# Patient Record
Sex: FEMALE | Race: OTHER | HISPANIC OR LATINO | Employment: UNEMPLOYED | URBAN - METROPOLITAN AREA
[De-identification: names, ages, dates, MRNs, and addresses within clinical notes are randomized per-mention and may not be internally consistent; named-entity substitution may affect disease eponyms.]

---

## 2024-10-28 ENCOUNTER — APPOINTMENT (OUTPATIENT)
Dept: RADIOLOGY | Facility: HOSPITAL | Age: 49
End: 2024-10-28
Payer: COMMERCIAL

## 2024-10-28 ENCOUNTER — HOSPITAL ENCOUNTER (EMERGENCY)
Facility: HOSPITAL | Age: 49
Discharge: HOME | End: 2024-10-29
Attending: STUDENT IN AN ORGANIZED HEALTH CARE EDUCATION/TRAINING PROGRAM
Payer: COMMERCIAL

## 2024-10-28 VITALS
HEART RATE: 96 BPM | WEIGHT: 187 LBS | SYSTOLIC BLOOD PRESSURE: 184 MMHG | RESPIRATION RATE: 20 BRPM | TEMPERATURE: 97.5 F | WEIGHT: 187 LBS | SYSTOLIC BLOOD PRESSURE: 184 MMHG | HEIGHT: 62 IN | HEIGHT: 62 IN | OXYGEN SATURATION: 97 % | HEART RATE: 96 BPM | DIASTOLIC BLOOD PRESSURE: 110 MMHG | DIASTOLIC BLOOD PRESSURE: 110 MMHG | RESPIRATION RATE: 20 BRPM | TEMPERATURE: 97.5 F | OXYGEN SATURATION: 97 % | BODY MASS INDEX: 34.41 KG/M2 | BODY MASS INDEX: 34.41 KG/M2

## 2024-10-28 DIAGNOSIS — L02.416 ABSCESS OF LEFT THIGH: ICD-10-CM

## 2024-10-28 DIAGNOSIS — J40 BRONCHITIS: Primary | ICD-10-CM

## 2024-10-28 LAB
FLUAV RNA RESP QL NAA+PROBE: NOT DETECTED
FLUAV RNA RESP QL NAA+PROBE: NOT DETECTED
FLUBV RNA RESP QL NAA+PROBE: NOT DETECTED
FLUBV RNA RESP QL NAA+PROBE: NOT DETECTED
RSV RNA RESP QL NAA+PROBE: NOT DETECTED
RSV RNA RESP QL NAA+PROBE: NOT DETECTED
SARS-COV-2 RNA RESP QL NAA+PROBE: NOT DETECTED
SARS-COV-2 RNA RESP QL NAA+PROBE: NOT DETECTED

## 2024-10-28 PROCEDURE — 93971 EXTREMITY STUDY: CPT

## 2024-10-28 PROCEDURE — 71046 X-RAY EXAM CHEST 2 VIEWS: CPT

## 2024-10-28 PROCEDURE — 10140 I&D HMTMA SEROMA/FLUID COLLJ: CPT | Performed by: STUDENT IN AN ORGANIZED HEALTH CARE EDUCATION/TRAINING PROGRAM

## 2024-10-28 PROCEDURE — 2500000004 HC RX 250 GENERAL PHARMACY W/ HCPCS (ALT 636 FOR OP/ED): Performed by: STUDENT IN AN ORGANIZED HEALTH CARE EDUCATION/TRAINING PROGRAM

## 2024-10-28 PROCEDURE — 94640 AIRWAY INHALATION TREATMENT: CPT

## 2024-10-28 PROCEDURE — 87637 SARSCOV2&INF A&B&RSV AMP PRB: CPT | Performed by: STUDENT IN AN ORGANIZED HEALTH CARE EDUCATION/TRAINING PROGRAM

## 2024-10-28 PROCEDURE — 93971 EXTREMITY STUDY: CPT | Performed by: RADIOLOGY

## 2024-10-28 PROCEDURE — 71046 X-RAY EXAM CHEST 2 VIEWS: CPT | Performed by: RADIOLOGY

## 2024-10-28 PROCEDURE — 2500000001 HC RX 250 WO HCPCS SELF ADMINISTERED DRUGS (ALT 637 FOR MEDICARE OP): Performed by: STUDENT IN AN ORGANIZED HEALTH CARE EDUCATION/TRAINING PROGRAM

## 2024-10-28 RX ORDER — DEXAMETHASONE 6 MG/1
6 TABLET ORAL ONCE
Status: COMPLETED | OUTPATIENT
Start: 2024-10-28 | End: 2024-10-28

## 2024-10-28 RX ORDER — ALBUTEROL SULFATE 90 UG/1
2 INHALANT RESPIRATORY (INHALATION) ONCE
Status: COMPLETED | OUTPATIENT
Start: 2024-10-28 | End: 2024-10-28

## 2024-10-28 RX ADMIN — ALBUTEROL SULFATE 2 PUFF: 90 AEROSOL, METERED RESPIRATORY (INHALATION) at 23:35

## 2024-10-28 RX ADMIN — DEXAMETHASONE 6 MG: 6 TABLET ORAL at 23:27

## 2024-10-28 ASSESSMENT — LIFESTYLE VARIABLES
EVER FELT BAD OR GUILTY ABOUT YOUR DRINKING: NO
HAVE PEOPLE ANNOYED YOU BY CRITICIZING YOUR DRINKING: NO
EVER HAD A DRINK FIRST THING IN THE MORNING TO STEADY YOUR NERVES TO GET RID OF A HANGOVER: NO
TOTAL SCORE: 0
HAVE YOU EVER FELT YOU SHOULD CUT DOWN ON YOUR DRINKING: NO

## 2024-10-28 ASSESSMENT — PAIN DESCRIPTION - LOCATION: LOCATION: HIP

## 2024-10-28 ASSESSMENT — PAIN DESCRIPTION - ORIENTATION: ORIENTATION: LEFT

## 2024-10-28 ASSESSMENT — PAIN DESCRIPTION - PAIN TYPE: TYPE: CHRONIC PAIN

## 2024-10-28 ASSESSMENT — PAIN SCALES - GENERAL: PAINLEVEL_OUTOF10: 8

## 2024-10-28 ASSESSMENT — PAIN - FUNCTIONAL ASSESSMENT: PAIN_FUNCTIONAL_ASSESSMENT: 0-10

## 2024-10-29 PROCEDURE — 87205 SMEAR GRAM STAIN: CPT | Mod: PARLAB | Performed by: STUDENT IN AN ORGANIZED HEALTH CARE EDUCATION/TRAINING PROGRAM

## 2024-10-29 PROCEDURE — 10060 I&D ABSCESS SIMPLE/SINGLE: CPT

## 2024-10-29 PROCEDURE — 99284 EMERGENCY DEPT VISIT MOD MDM: CPT | Mod: 25

## 2024-10-29 PROCEDURE — 87070 CULTURE OTHR SPECIMN AEROBIC: CPT | Mod: PARLAB | Performed by: STUDENT IN AN ORGANIZED HEALTH CARE EDUCATION/TRAINING PROGRAM

## 2024-10-29 PROCEDURE — 2500000002 HC RX 250 W HCPCS SELF ADMINISTERED DRUGS (ALT 637 FOR MEDICARE OP, ALT 636 FOR OP/ED): Mod: MUE | Performed by: STUDENT IN AN ORGANIZED HEALTH CARE EDUCATION/TRAINING PROGRAM

## 2024-10-29 PROCEDURE — 2500000002 HC RX 250 W HCPCS SELF ADMINISTERED DRUGS (ALT 637 FOR MEDICARE OP, ALT 636 FOR OP/ED): Performed by: STUDENT IN AN ORGANIZED HEALTH CARE EDUCATION/TRAINING PROGRAM

## 2024-10-29 RX ORDER — METHYLPREDNISOLONE 4 MG/1
TABLET ORAL
Qty: 21 TABLET | Refills: 0 | Status: SHIPPED | OUTPATIENT
Start: 2024-10-29 | End: 2024-11-05

## 2024-10-29 RX ORDER — SULFAMETHOXAZOLE AND TRIMETHOPRIM 800; 160 MG/1; MG/1
1 TABLET ORAL ONCE
Status: COMPLETED | OUTPATIENT
Start: 2024-10-29 | End: 2024-10-29

## 2024-10-29 RX ORDER — SULFAMETHOXAZOLE AND TRIMETHOPRIM 800; 160 MG/1; MG/1
1 TABLET ORAL 2 TIMES DAILY
Qty: 28 TABLET | Refills: 0 | Status: SHIPPED | OUTPATIENT
Start: 2024-10-29 | End: 2024-11-12

## 2024-10-29 RX ADMIN — SULFAMETHOXAZOLE AND TRIMETHOPRIM 1 TABLET: 800; 160 TABLET ORAL at 00:20

## 2024-10-30 ENCOUNTER — OFFICE VISIT (OUTPATIENT)
Dept: PRIMARY CARE | Facility: CLINIC | Age: 49
End: 2024-10-30
Payer: COMMERCIAL

## 2024-10-30 VITALS
SYSTOLIC BLOOD PRESSURE: 126 MMHG | OXYGEN SATURATION: 97 % | TEMPERATURE: 98 F | BODY MASS INDEX: 30.13 KG/M2 | DIASTOLIC BLOOD PRESSURE: 88 MMHG | WEIGHT: 159.6 LBS | HEIGHT: 61 IN | RESPIRATION RATE: 16 BRPM | HEART RATE: 92 BPM

## 2024-10-30 DIAGNOSIS — J40 BRONCHITIS: Primary | ICD-10-CM

## 2024-10-30 DIAGNOSIS — L02.416 ABSCESS OF LEFT THIGH: ICD-10-CM

## 2024-10-30 PROCEDURE — 99214 OFFICE O/P EST MOD 30 MIN: CPT | Performed by: NURSE PRACTITIONER

## 2024-10-30 PROCEDURE — 2500000002 HC RX 250 W HCPCS SELF ADMINISTERED DRUGS (ALT 637 FOR MEDICARE OP, ALT 636 FOR OP/ED): Mod: MUE | Performed by: NURSE PRACTITIONER

## 2024-10-30 RX ORDER — ALBUTEROL SULFATE 0.83 MG/ML
2.5 SOLUTION RESPIRATORY (INHALATION) ONCE
Status: COMPLETED | OUTPATIENT
Start: 2024-10-30 | End: 2024-10-30

## 2024-10-30 RX ORDER — ALBUTEROL SULFATE 90 UG/1
2 INHALANT RESPIRATORY (INHALATION) EVERY 4 HOURS PRN
COMMUNITY
Start: 2024-10-28

## 2024-10-30 RX ORDER — OMEPRAZOLE 20 MG/1
20 CAPSULE, DELAYED RELEASE ORAL
COMMUNITY
Start: 2023-04-16

## 2024-10-30 SDOH — ECONOMIC STABILITY: FOOD INSECURITY: WITHIN THE PAST 12 MONTHS, YOU WORRIED THAT YOUR FOOD WOULD RUN OUT BEFORE YOU GOT MONEY TO BUY MORE.: NEVER TRUE

## 2024-10-30 SDOH — ECONOMIC STABILITY: FOOD INSECURITY: WITHIN THE PAST 12 MONTHS, THE FOOD YOU BOUGHT JUST DIDN'T LAST AND YOU DIDN'T HAVE MONEY TO GET MORE.: NEVER TRUE

## 2024-10-30 ASSESSMENT — COLUMBIA-SUICIDE SEVERITY RATING SCALE - C-SSRS
2. HAVE YOU ACTUALLY HAD ANY THOUGHTS OF KILLING YOURSELF?: NO
6. HAVE YOU EVER DONE ANYTHING, STARTED TO DO ANYTHING, OR PREPARED TO DO ANYTHING TO END YOUR LIFE?: NO
1. IN THE PAST MONTH, HAVE YOU WISHED YOU WERE DEAD OR WISHED YOU COULD GO TO SLEEP AND NOT WAKE UP?: NO

## 2024-10-30 ASSESSMENT — LIFESTYLE VARIABLES
HOW OFTEN DO YOU HAVE SIX OR MORE DRINKS ON ONE OCCASION: NEVER
HOW OFTEN DO YOU HAVE A DRINK CONTAINING ALCOHOL: NEVER
HOW MANY STANDARD DRINKS CONTAINING ALCOHOL DO YOU HAVE ON A TYPICAL DAY: PATIENT DOES NOT DRINK
AUDIT-C TOTAL SCORE: 0
SKIP TO QUESTIONS 9-10: 1

## 2024-10-30 ASSESSMENT — ANXIETY QUESTIONNAIRES
7. FEELING AFRAID AS IF SOMETHING AWFUL MIGHT HAPPEN: NOT AT ALL
IF YOU CHECKED OFF ANY PROBLEMS ON THIS QUESTIONNAIRE, HOW DIFFICULT HAVE THESE PROBLEMS MADE IT FOR YOU TO DO YOUR WORK, TAKE CARE OF THINGS AT HOME, OR GET ALONG WITH OTHER PEOPLE: NOT DIFFICULT AT ALL
2. NOT BEING ABLE TO STOP OR CONTROL WORRYING: NOT AT ALL
4. TROUBLE RELAXING: NOT AT ALL
6. BECOMING EASILY ANNOYED OR IRRITABLE: NOT AT ALL
5. BEING SO RESTLESS THAT IT IS HARD TO SIT STILL: NOT AT ALL
1. FEELING NERVOUS, ANXIOUS, OR ON EDGE: NOT AT ALL
GAD7 TOTAL SCORE: 0
3. WORRYING TOO MUCH ABOUT DIFFERENT THINGS: NOT AT ALL

## 2024-10-30 ASSESSMENT — PAIN SCALES - GENERAL: PAINLEVEL_OUTOF10: 0-NO PAIN

## 2024-10-30 ASSESSMENT — ENCOUNTER SYMPTOMS
OCCASIONAL FEELINGS OF UNSTEADINESS: 1
LOSS OF SENSATION IN FEET: 0
DEPRESSION: 0

## 2024-10-30 ASSESSMENT — PATIENT HEALTH QUESTIONNAIRE - PHQ9
1. LITTLE INTEREST OR PLEASURE IN DOING THINGS: NOT AT ALL
SUM OF ALL RESPONSES TO PHQ9 QUESTIONS 1 & 2: 0
2. FEELING DOWN, DEPRESSED OR HOPELESS: NOT AT ALL

## 2024-10-31 LAB
BACTERIA SPEC CULT: NORMAL
BACTERIA SPEC CULT: NORMAL
GRAM STN SPEC: NORMAL

## 2024-11-06 ENCOUNTER — APPOINTMENT (OUTPATIENT)
Dept: PRIMARY CARE | Facility: CLINIC | Age: 49
End: 2024-11-06
Payer: COMMERCIAL

## 2024-11-06 ENCOUNTER — APPOINTMENT (OUTPATIENT)
Dept: ORTHOPEDIC SURGERY | Facility: CLINIC | Age: 49
End: 2024-11-06
Payer: COMMERCIAL

## 2024-11-13 ENCOUNTER — TELEPHONE (OUTPATIENT)
Dept: PRIMARY CARE | Facility: CLINIC | Age: 49
End: 2024-11-13
Payer: MEDICARE

## 2024-11-13 NOTE — TELEPHONE ENCOUNTER
Rajani from isiting nurses called  . Patient is currently at  [facility]. Patient is soon to be discharged with home nursing and physical therapy. Visiting nursing is is requesting verbal orders from the provider.

## 2024-11-26 ENCOUNTER — TELEPHONE (OUTPATIENT)
Dept: PRIMARY CARE | Facility: CLINIC | Age: 49
End: 2024-11-26
Payer: MEDICARE

## 2024-11-26 NOTE — TELEPHONE ENCOUNTER
Visiting Nurse Gerard called regarding patient about to get discharge from the hospital. Visiting nurse Rajani  stated they are reaching out to the provider to get verbal orders for home nursing care and physical therapy. Best number to call Nurse Gerard at [phone].The visit nurse stated she would just like to hear from provider wether provider will carry the orders or not.

## 2024-11-27 ENCOUNTER — TELEPHONE (OUTPATIENT)
Dept: PRIMARY CARE | Facility: CLINIC | Age: 49
End: 2024-11-27
Payer: MEDICARE

## 2024-11-27 NOTE — TELEPHONE ENCOUNTER
Spoke with Rajani from home care [phone] --- patient with possible discharge from hospital on Friday.   She will coordinate home going orders with other provider.

## 2025-05-07 ENCOUNTER — HOSPITAL ENCOUNTER (INPATIENT)
Facility: HOSPITAL | Age: 50
End: 2025-05-07
Attending: STUDENT IN AN ORGANIZED HEALTH CARE EDUCATION/TRAINING PROGRAM | Admitting: NURSE PRACTITIONER
Payer: MEDICARE

## 2025-05-07 DIAGNOSIS — I12.9 HYPERTENSIVE CHRONIC KIDNEY DISEASE WITH STAGE 1 THROUGH STAGE 4 CHRONIC KIDNEY DISEASE, OR UNSPECIFIED CHRONIC KIDNEY DISEASE: ICD-10-CM

## 2025-05-07 DIAGNOSIS — N17.9 AKI (ACUTE KIDNEY INJURY): Primary | ICD-10-CM

## 2025-05-07 DIAGNOSIS — E87.6 HYPOKALEMIA: ICD-10-CM

## 2025-05-07 DIAGNOSIS — I10 ESSENTIAL (PRIMARY) HYPERTENSION: ICD-10-CM

## 2025-05-07 PROBLEM — D64.9 ANEMIA: Status: ACTIVE | Noted: 2025-05-07

## 2025-05-07 LAB
ANION GAP SERPL CALC-SCNC: 16 MMOL/L (ref 10–20)
BASOPHILS # BLD AUTO: 0.04 X10*3/UL (ref 0–0.1)
BASOPHILS NFR BLD AUTO: 0.6 %
BUN SERPL-MCNC: 13 MG/DL (ref 6–23)
CALCIUM SERPL-MCNC: 9.7 MG/DL (ref 8.6–10.3)
CHLORIDE SERPL-SCNC: 101 MMOL/L (ref 98–107)
CO2 SERPL-SCNC: 25 MMOL/L (ref 21–32)
CREAT SERPL-MCNC: 3.24 MG/DL (ref 0.5–1.05)
EGFRCR SERPLBLD CKD-EPI 2021: 17 ML/MIN/1.73M*2
EOSINOPHIL # BLD AUTO: 0.21 X10*3/UL (ref 0–0.7)
EOSINOPHIL NFR BLD AUTO: 2.9 %
ERYTHROCYTE [DISTWIDTH] IN BLOOD BY AUTOMATED COUNT: 16.3 % (ref 11.5–14.5)
GLUCOSE BLD MANUAL STRIP-MCNC: 149 MG/DL (ref 74–99)
GLUCOSE SERPL-MCNC: 128 MG/DL (ref 74–99)
HCT VFR BLD AUTO: 28.8 % (ref 36–46)
HGB BLD-MCNC: 9.6 G/DL (ref 12–16)
IMM GRANULOCYTES # BLD AUTO: 0.04 X10*3/UL (ref 0–0.7)
IMM GRANULOCYTES NFR BLD AUTO: 0.6 % (ref 0–0.9)
LYMPHOCYTES # BLD AUTO: 0.91 X10*3/UL (ref 1.2–4.8)
LYMPHOCYTES NFR BLD AUTO: 12.7 %
MCH RBC QN AUTO: 30.2 PG (ref 26–34)
MCHC RBC AUTO-ENTMCNC: 33.3 G/DL (ref 32–36)
MCV RBC AUTO: 91 FL (ref 80–100)
MONOCYTES # BLD AUTO: 1.05 X10*3/UL (ref 0.1–1)
MONOCYTES NFR BLD AUTO: 14.7 %
NEUTROPHILS # BLD AUTO: 4.9 X10*3/UL (ref 1.2–7.7)
NEUTROPHILS NFR BLD AUTO: 68.5 %
NRBC BLD-RTO: 0 /100 WBCS (ref 0–0)
PLATELET # BLD AUTO: 381 X10*3/UL (ref 150–450)
POTASSIUM SERPL-SCNC: 3.1 MMOL/L (ref 3.5–5.3)
RBC # BLD AUTO: 3.18 X10*6/UL (ref 4–5.2)
SODIUM SERPL-SCNC: 139 MMOL/L (ref 136–145)
WBC # BLD AUTO: 7.2 X10*3/UL (ref 4.4–11.3)

## 2025-05-07 PROCEDURE — 1100000001 HC PRIVATE ROOM DAILY

## 2025-05-07 PROCEDURE — 2500000004 HC RX 250 GENERAL PHARMACY W/ HCPCS (ALT 636 FOR OP/ED): Mod: JZ | Performed by: STUDENT IN AN ORGANIZED HEALTH CARE EDUCATION/TRAINING PROGRAM

## 2025-05-07 PROCEDURE — 82947 ASSAY GLUCOSE BLOOD QUANT: CPT

## 2025-05-07 PROCEDURE — 80048 BASIC METABOLIC PNL TOTAL CA: CPT | Performed by: STUDENT IN AN ORGANIZED HEALTH CARE EDUCATION/TRAINING PROGRAM

## 2025-05-07 PROCEDURE — 36415 COLL VENOUS BLD VENIPUNCTURE: CPT | Performed by: STUDENT IN AN ORGANIZED HEALTH CARE EDUCATION/TRAINING PROGRAM

## 2025-05-07 PROCEDURE — 96360 HYDRATION IV INFUSION INIT: CPT

## 2025-05-07 PROCEDURE — 99285 EMERGENCY DEPT VISIT HI MDM: CPT | Performed by: STUDENT IN AN ORGANIZED HEALTH CARE EDUCATION/TRAINING PROGRAM

## 2025-05-07 PROCEDURE — 99222 1ST HOSP IP/OBS MODERATE 55: CPT | Performed by: NURSE PRACTITIONER

## 2025-05-07 PROCEDURE — 85025 COMPLETE CBC W/AUTO DIFF WBC: CPT | Performed by: STUDENT IN AN ORGANIZED HEALTH CARE EDUCATION/TRAINING PROGRAM

## 2025-05-07 RX ORDER — SODIUM CHLORIDE, SODIUM LACTATE, POTASSIUM CHLORIDE, CALCIUM CHLORIDE 600; 310; 30; 20 MG/100ML; MG/100ML; MG/100ML; MG/100ML
75 INJECTION, SOLUTION INTRAVENOUS CONTINUOUS
Status: ACTIVE | OUTPATIENT
Start: 2025-05-07 | End: 2025-05-09

## 2025-05-07 RX ORDER — INSULIN LISPRO 100 [IU]/ML
0-10 INJECTION, SOLUTION INTRAVENOUS; SUBCUTANEOUS
Status: DISCONTINUED | OUTPATIENT
Start: 2025-05-08 | End: 2025-05-22 | Stop reason: HOSPADM

## 2025-05-07 RX ORDER — ACETAMINOPHEN 325 MG/1
650 TABLET ORAL EVERY 4 HOURS PRN
Status: DISCONTINUED | OUTPATIENT
Start: 2025-05-07 | End: 2025-05-22 | Stop reason: HOSPADM

## 2025-05-07 RX ORDER — DEXTROSE 50 % IN WATER (D50W) INTRAVENOUS SYRINGE
25
Status: DISCONTINUED | OUTPATIENT
Start: 2025-05-07 | End: 2025-05-22 | Stop reason: HOSPADM

## 2025-05-07 RX ORDER — POTASSIUM CHLORIDE 20 MEQ/1
40 TABLET, EXTENDED RELEASE ORAL ONCE
Status: COMPLETED | OUTPATIENT
Start: 2025-05-07 | End: 2025-05-08

## 2025-05-07 RX ORDER — DEXTROSE 50 % IN WATER (D50W) INTRAVENOUS SYRINGE
12.5
Status: DISCONTINUED | OUTPATIENT
Start: 2025-05-07 | End: 2025-05-22 | Stop reason: HOSPADM

## 2025-05-07 RX ADMIN — SODIUM CHLORIDE 1000 ML: 0.9 INJECTION, SOLUTION INTRAVENOUS at 19:47

## 2025-05-07 ASSESSMENT — PAIN SCALES - GENERAL: PAINLEVEL_OUTOF10: 0 - NO PAIN

## 2025-05-07 ASSESSMENT — PAIN - FUNCTIONAL ASSESSMENT: PAIN_FUNCTIONAL_ASSESSMENT: 0-10

## 2025-05-07 NOTE — ED TRIAGE NOTES
Pt came in from nursing facility by ems c/o abnormal labs. EMS states the facility said her vancomycin levels are high. Pt has a pic line and is diabetic. Pt denies cp sob.

## 2025-05-08 ENCOUNTER — APPOINTMENT (OUTPATIENT)
Dept: RADIOLOGY | Facility: HOSPITAL | Age: 50
End: 2025-05-08
Payer: MEDICARE

## 2025-05-08 LAB
ANION GAP SERPL CALC-SCNC: 16 MMOL/L (ref 10–20)
BUN SERPL-MCNC: 12 MG/DL (ref 6–23)
CALCIUM SERPL-MCNC: 9.1 MG/DL (ref 8.6–10.3)
CHLORIDE SERPL-SCNC: 106 MMOL/L (ref 98–107)
CO2 SERPL-SCNC: 23 MMOL/L (ref 21–32)
CREAT SERPL-MCNC: 3.05 MG/DL (ref 0.5–1.05)
EGFRCR SERPLBLD CKD-EPI 2021: 18 ML/MIN/1.73M*2
ERYTHROCYTE [DISTWIDTH] IN BLOOD BY AUTOMATED COUNT: 16.3 % (ref 11.5–14.5)
GLUCOSE BLD MANUAL STRIP-MCNC: 102 MG/DL (ref 74–99)
GLUCOSE BLD MANUAL STRIP-MCNC: 117 MG/DL (ref 74–99)
GLUCOSE BLD MANUAL STRIP-MCNC: 137 MG/DL (ref 74–99)
GLUCOSE BLD MANUAL STRIP-MCNC: 166 MG/DL (ref 74–99)
GLUCOSE SERPL-MCNC: 95 MG/DL (ref 74–99)
HCT VFR BLD AUTO: 28.3 % (ref 36–46)
HGB BLD-MCNC: 9.1 G/DL (ref 12–16)
HOLD SPECIMEN: 293
HOLD SPECIMEN: 293
HOLD SPECIMEN: NORMAL
MCH RBC QN AUTO: 29.4 PG (ref 26–34)
MCHC RBC AUTO-ENTMCNC: 32.2 G/DL (ref 32–36)
MCV RBC AUTO: 91 FL (ref 80–100)
NRBC BLD-RTO: 0 /100 WBCS (ref 0–0)
PLATELET # BLD AUTO: 377 X10*3/UL (ref 150–450)
POTASSIUM SERPL-SCNC: 3.9 MMOL/L (ref 3.5–5.3)
RBC # BLD AUTO: 3.1 X10*6/UL (ref 4–5.2)
SODIUM SERPL-SCNC: 141 MMOL/L (ref 136–145)
VANCOMYCIN TROUGH SERPL-MCNC: 47.8 UG/ML (ref 5–20)
WBC # BLD AUTO: 6.6 X10*3/UL (ref 4.4–11.3)

## 2025-05-08 PROCEDURE — 80048 BASIC METABOLIC PNL TOTAL CA: CPT | Performed by: NURSE PRACTITIONER

## 2025-05-08 PROCEDURE — 2500000001 HC RX 250 WO HCPCS SELF ADMINISTERED DRUGS (ALT 637 FOR MEDICARE OP): Performed by: NURSE PRACTITIONER

## 2025-05-08 PROCEDURE — 97161 PT EVAL LOW COMPLEX 20 MIN: CPT | Mod: GP

## 2025-05-08 PROCEDURE — 71045 X-RAY EXAM CHEST 1 VIEW: CPT | Performed by: INTERNAL MEDICINE

## 2025-05-08 PROCEDURE — 87081 CULTURE SCREEN ONLY: CPT | Mod: PARLAB | Performed by: NURSE PRACTITIONER

## 2025-05-08 PROCEDURE — 97165 OT EVAL LOW COMPLEX 30 MIN: CPT | Mod: GO

## 2025-05-08 PROCEDURE — 1100000001 HC PRIVATE ROOM DAILY

## 2025-05-08 PROCEDURE — 80202 ASSAY OF VANCOMYCIN: CPT | Performed by: NURSE PRACTITIONER

## 2025-05-08 PROCEDURE — 36415 COLL VENOUS BLD VENIPUNCTURE: CPT | Performed by: NURSE PRACTITIONER

## 2025-05-08 PROCEDURE — 2500000004 HC RX 250 GENERAL PHARMACY W/ HCPCS (ALT 636 FOR OP/ED): Mod: JZ

## 2025-05-08 PROCEDURE — 71045 X-RAY EXAM CHEST 1 VIEW: CPT

## 2025-05-08 PROCEDURE — 85027 COMPLETE CBC AUTOMATED: CPT | Performed by: NURSE PRACTITIONER

## 2025-05-08 PROCEDURE — 2500000002 HC RX 250 W HCPCS SELF ADMINISTERED DRUGS (ALT 637 FOR MEDICARE OP, ALT 636 FOR OP/ED): Performed by: NURSE PRACTITIONER

## 2025-05-08 PROCEDURE — 82947 ASSAY GLUCOSE BLOOD QUANT: CPT

## 2025-05-08 PROCEDURE — 2500000004 HC RX 250 GENERAL PHARMACY W/ HCPCS (ALT 636 FOR OP/ED): Mod: JZ | Performed by: NURSE PRACTITIONER

## 2025-05-08 RX ORDER — METOPROLOL TARTRATE 1 MG/ML
5 INJECTION, SOLUTION INTRAVENOUS ONCE AS NEEDED
Status: COMPLETED | OUTPATIENT
Start: 2025-05-08 | End: 2025-05-08

## 2025-05-08 RX ORDER — VANCOMYCIN HYDROCHLORIDE 1 G/20ML
INJECTION, POWDER, LYOPHILIZED, FOR SOLUTION INTRAVENOUS DAILY PRN
Status: DISCONTINUED | OUTPATIENT
Start: 2025-05-08 | End: 2025-05-22 | Stop reason: HOSPADM

## 2025-05-08 RX ORDER — METOPROLOL TARTRATE 1 MG/ML
5 INJECTION, SOLUTION INTRAVENOUS ONCE
Status: COMPLETED | OUTPATIENT
Start: 2025-05-08 | End: 2025-05-08

## 2025-05-08 RX ADMIN — ACETAMINOPHEN 650 MG: 325 TABLET, FILM COATED ORAL at 00:18

## 2025-05-08 RX ADMIN — ACETAMINOPHEN 650 MG: 325 TABLET, FILM COATED ORAL at 20:39

## 2025-05-08 RX ADMIN — METOPROLOL TARTRATE 5 MG: 5 INJECTION INTRAVENOUS at 20:39

## 2025-05-08 RX ADMIN — METOPROLOL TARTRATE 5 MG: 5 INJECTION INTRAVENOUS at 05:00

## 2025-05-08 RX ADMIN — SODIUM CHLORIDE, SODIUM LACTATE, POTASSIUM CHLORIDE, AND CALCIUM CHLORIDE 75 ML/HR: .6; .31; .03; .02 INJECTION, SOLUTION INTRAVENOUS at 00:18

## 2025-05-08 RX ADMIN — POTASSIUM CHLORIDE 40 MEQ: 1500 TABLET, EXTENDED RELEASE ORAL at 00:18

## 2025-05-08 SDOH — SOCIAL STABILITY: SOCIAL INSECURITY
WITHIN THE LAST YEAR, HAVE YOU BEEN RAPED OR FORCED TO HAVE ANY KIND OF SEXUAL ACTIVITY BY YOUR PARTNER OR EX-PARTNER?: NO

## 2025-05-08 SDOH — ECONOMIC STABILITY: HOUSING INSECURITY: IN THE PAST 12 MONTHS, HOW MANY TIMES HAVE YOU MOVED WHERE YOU WERE LIVING?: 1

## 2025-05-08 SDOH — SOCIAL STABILITY: SOCIAL INSECURITY: DO YOU FEEL ANYONE HAS EXPLOITED OR TAKEN ADVANTAGE OF YOU FINANCIALLY OR OF YOUR PERSONAL PROPERTY?: NO

## 2025-05-08 SDOH — ECONOMIC STABILITY: FOOD INSECURITY
WITHIN THE PAST 12 MONTHS, YOU WORRIED THAT YOUR FOOD WOULD RUN OUT BEFORE YOU GOT THE MONEY TO BUY MORE.: PATIENT DECLINED

## 2025-05-08 SDOH — ECONOMIC STABILITY: FOOD INSECURITY: HOW HARD IS IT FOR YOU TO PAY FOR THE VERY BASICS LIKE FOOD, HOUSING, MEDICAL CARE, AND HEATING?: PATIENT DECLINED

## 2025-05-08 SDOH — ECONOMIC STABILITY: HOUSING INSECURITY: AT ANY TIME IN THE PAST 12 MONTHS, WERE YOU HOMELESS OR LIVING IN A SHELTER (INCLUDING NOW)?: PATIENT DECLINED

## 2025-05-08 SDOH — SOCIAL STABILITY: SOCIAL INSECURITY: HAVE YOU HAD ANY THOUGHTS OF HARMING ANYONE ELSE?: NO

## 2025-05-08 SDOH — SOCIAL STABILITY: SOCIAL INSECURITY: DO YOU FEEL UNSAFE GOING BACK TO THE PLACE WHERE YOU ARE LIVING?: NO

## 2025-05-08 SDOH — HEALTH STABILITY: MENTAL HEALTH
DO YOU FEEL STRESS - TENSE, RESTLESS, NERVOUS, OR ANXIOUS, OR UNABLE TO SLEEP AT NIGHT BECAUSE YOUR MIND IS TROUBLED ALL THE TIME - THESE DAYS?: PATIENT DECLINED

## 2025-05-08 SDOH — SOCIAL STABILITY: SOCIAL INSECURITY: WITHIN THE LAST YEAR, HAVE YOU BEEN AFRAID OF YOUR PARTNER OR EX-PARTNER?: NO

## 2025-05-08 SDOH — ECONOMIC STABILITY: FOOD INSECURITY: WITHIN THE PAST 12 MONTHS, THE FOOD YOU BOUGHT JUST DIDN'T LAST AND YOU DIDN'T HAVE MONEY TO GET MORE.: PATIENT DECLINED

## 2025-05-08 SDOH — SOCIAL STABILITY: SOCIAL INSECURITY
WITHIN THE LAST YEAR, HAVE YOU BEEN KICKED, HIT, SLAPPED, OR OTHERWISE PHYSICALLY HURT BY YOUR PARTNER OR EX-PARTNER?: NO

## 2025-05-08 SDOH — SOCIAL STABILITY: SOCIAL INSECURITY: WITHIN THE LAST YEAR, HAVE YOU BEEN HUMILIATED OR EMOTIONALLY ABUSED IN OTHER WAYS BY YOUR PARTNER OR EX-PARTNER?: NO

## 2025-05-08 SDOH — SOCIAL STABILITY: SOCIAL INSECURITY: WERE YOU ABLE TO COMPLETE ALL THE BEHAVIORAL HEALTH SCREENINGS?: YES

## 2025-05-08 SDOH — SOCIAL STABILITY: SOCIAL INSECURITY: DOES ANYONE TRY TO KEEP YOU FROM HAVING/CONTACTING OTHER FRIENDS OR DOING THINGS OUTSIDE YOUR HOME?: NO

## 2025-05-08 SDOH — SOCIAL STABILITY: SOCIAL INSECURITY: ARE THERE ANY APPARENT SIGNS OF INJURIES/BEHAVIORS THAT COULD BE RELATED TO ABUSE/NEGLECT?: NO

## 2025-05-08 SDOH — SOCIAL STABILITY: SOCIAL INSECURITY: ARE YOU OR HAVE YOU BEEN THREATENED OR ABUSED PHYSICALLY, EMOTIONALLY, OR SEXUALLY BY ANYONE?: NO

## 2025-05-08 SDOH — ECONOMIC STABILITY: HOUSING INSECURITY: IN THE LAST 12 MONTHS, WAS THERE A TIME WHEN YOU WERE NOT ABLE TO PAY THE MORTGAGE OR RENT ON TIME?: PATIENT DECLINED

## 2025-05-08 SDOH — SOCIAL STABILITY: SOCIAL INSECURITY: HAVE YOU HAD THOUGHTS OF HARMING ANYONE ELSE?: NO

## 2025-05-08 SDOH — ECONOMIC STABILITY: INCOME INSECURITY
IN THE PAST 12 MONTHS HAS THE ELECTRIC, GAS, OIL, OR WATER COMPANY THREATENED TO SHUT OFF SERVICES IN YOUR HOME?: PATIENT DECLINED

## 2025-05-08 SDOH — SOCIAL STABILITY: SOCIAL INSECURITY: ABUSE: ADULT

## 2025-05-08 SDOH — SOCIAL STABILITY: SOCIAL INSECURITY: HAS ANYONE EVER THREATENED TO HURT YOUR FAMILY OR YOUR PETS?: NO

## 2025-05-08 SDOH — ECONOMIC STABILITY: TRANSPORTATION INSECURITY
IN THE PAST 12 MONTHS, HAS LACK OF TRANSPORTATION KEPT YOU FROM MEDICAL APPOINTMENTS OR FROM GETTING MEDICATIONS?: PATIENT DECLINED

## 2025-05-08 ASSESSMENT — LIFESTYLE VARIABLES
AUDIT-C TOTAL SCORE: 0
AUDIT-C TOTAL SCORE: 0
PRESCIPTION_ABUSE_PAST_12_MONTHS: NO
HOW OFTEN DO YOU HAVE 6 OR MORE DRINKS ON ONE OCCASION: NEVER
AUDIT-C TOTAL SCORE: 0
SKIP TO QUESTIONS 9-10: 1
SKIP TO QUESTIONS 9-10: 1
HOW OFTEN DO YOU HAVE A DRINK CONTAINING ALCOHOL: NEVER
HOW OFTEN DO YOU HAVE 6 OR MORE DRINKS ON ONE OCCASION: NEVER
AUDIT-C TOTAL SCORE: 0
SUBSTANCE_ABUSE_PAST_12_MONTHS: NO
HOW OFTEN DO YOU HAVE A DRINK CONTAINING ALCOHOL: NEVER
HOW MANY STANDARD DRINKS CONTAINING ALCOHOL DO YOU HAVE ON A TYPICAL DAY: PATIENT DOES NOT DRINK
HOW MANY STANDARD DRINKS CONTAINING ALCOHOL DO YOU HAVE ON A TYPICAL DAY: PATIENT DOES NOT DRINK
SUBSTANCE_ABUSE_PAST_12_MONTHS: NO
PRESCIPTION_ABUSE_PAST_12_MONTHS: NO

## 2025-05-08 ASSESSMENT — ACTIVITIES OF DAILY LIVING (ADL)
HEARING - LEFT EAR: FUNCTIONAL
TOILETING: NEEDS ASSISTANCE
HEARING - RIGHT EAR: FUNCTIONAL
LACK_OF_TRANSPORTATION: PATIENT DECLINED
GROOMING: NEEDS ASSISTANCE
DRESSING YOURSELF: NEEDS ASSISTANCE
ASSISTIVE_DEVICE: WHEELCHAIR;WALKER
BATHING: NEEDS ASSISTANCE
PATIENT'S MEMORY ADEQUATE TO SAFELY COMPLETE DAILY ACTIVITIES?: YES
DRESSING YOURSELF: NEEDS ASSISTANCE
ASSISTIVE_DEVICE: WHEELCHAIR;WALKER
HEARING - RIGHT EAR: FUNCTIONAL
TOILETING: NEEDS ASSISTANCE
BATHING: NEEDS ASSISTANCE
JUDGMENT_ADEQUATE_SAFELY_COMPLETE_DAILY_ACTIVITIES: YES
JUDGMENT_ADEQUATE_SAFELY_COMPLETE_DAILY_ACTIVITIES: YES
WALKS IN HOME: NEEDS ASSISTANCE
WALKS IN HOME: DEPENDENT
ADEQUATE_TO_COMPLETE_ADL: YES
FEEDING YOURSELF: INDEPENDENT
LACK_OF_TRANSPORTATION: PATIENT DECLINED
FEEDING YOURSELF: INDEPENDENT
HEARING - LEFT EAR: FUNCTIONAL
PATIENT'S MEMORY ADEQUATE TO SAFELY COMPLETE DAILY ACTIVITIES?: NO
GROOMING: NEEDS ASSISTANCE

## 2025-05-08 ASSESSMENT — PAIN SCALES - GENERAL
PAINLEVEL_OUTOF10: 0 - NO PAIN
PAINLEVEL_OUTOF10: 6
PAINLEVEL_OUTOF10: 0 - NO PAIN
PAINLEVEL_OUTOF10: 0 - NO PAIN
PAINLEVEL_OUTOF10: 5 - MODERATE PAIN
PAINLEVEL_OUTOF10: 0 - NO PAIN

## 2025-05-08 ASSESSMENT — PATIENT HEALTH QUESTIONNAIRE - PHQ9
2. FEELING DOWN, DEPRESSED OR HOPELESS: NOT AT ALL
SUM OF ALL RESPONSES TO PHQ9 QUESTIONS 1 & 2: 0
2. FEELING DOWN, DEPRESSED OR HOPELESS: NOT AT ALL
1. LITTLE INTEREST OR PLEASURE IN DOING THINGS: NOT AT ALL
SUM OF ALL RESPONSES TO PHQ9 QUESTIONS 1 & 2: 0
1. LITTLE INTEREST OR PLEASURE IN DOING THINGS: NOT AT ALL

## 2025-05-08 ASSESSMENT — COGNITIVE AND FUNCTIONAL STATUS - GENERAL
DRESSING REGULAR LOWER BODY CLOTHING: A LOT
TURNING FROM BACK TO SIDE WHILE IN FLAT BAD: A LITTLE
WALKING IN HOSPITAL ROOM: A LITTLE
STANDING UP FROM CHAIR USING ARMS: A LITTLE
MOVING TO AND FROM BED TO CHAIR: A LOT
DAILY ACTIVITIY SCORE: 14
DRESSING REGULAR UPPER BODY CLOTHING: A LITTLE
WALKING IN HOSPITAL ROOM: TOTAL
EATING MEALS: A LITTLE
PERSONAL GROOMING: A LOT
PERSONAL GROOMING: A LITTLE
DRESSING REGULAR LOWER BODY CLOTHING: A LOT
MOVING FROM LYING ON BACK TO SITTING ON SIDE OF FLAT BED WITH BEDRAILS: A LITTLE
HELP NEEDED FOR BATHING: A LOT
MOBILITY SCORE: 16
CLIMB 3 TO 5 STEPS WITH RAILING: TOTAL
TURNING FROM BACK TO SIDE WHILE IN FLAT BAD: A LITTLE
DAILY ACTIVITIY SCORE: 16
MOBILITY SCORE: 11
MOVING FROM LYING ON BACK TO SITTING ON SIDE OF FLAT BED WITH BEDRAILS: A LITTLE
DRESSING REGULAR UPPER BODY CLOTHING: A LOT
TOILETING: A LITTLE
TOILETING: A LOT
PATIENT BASELINE BEDBOUND: NO
CLIMB 3 TO 5 STEPS WITH RAILING: TOTAL
STANDING UP FROM CHAIR USING ARMS: TOTAL
HELP NEEDED FOR BATHING: A LOT
MOVING TO AND FROM BED TO CHAIR: A LITTLE

## 2025-05-08 ASSESSMENT — PAIN DESCRIPTION - LOCATION
LOCATION: HIP
LOCATION: HIP

## 2025-05-08 ASSESSMENT — PAIN - FUNCTIONAL ASSESSMENT
PAIN_FUNCTIONAL_ASSESSMENT: 0-10

## 2025-05-08 ASSESSMENT — PAIN DESCRIPTION - DESCRIPTORS: DESCRIPTORS: ACHING

## 2025-05-08 ASSESSMENT — PAIN DESCRIPTION - ORIENTATION: ORIENTATION: LEFT

## 2025-05-08 NOTE — PROGRESS NOTES
Spiritual Care Visit   Notes:  Ms. Cherry welcomed a visit. This was a patient-centered visit. She was tearful. Per her request, I prayed. This spiritual habit brought her comfort and peace. This  offered an empathic presence and a holistic approach to wellness that integrates healing of the body, mind and spirit. There are no other needs.  Spiritual Care Request    Reason for Visit:  Routine Visit: Introduction (Consult)  Continue Visiting: No   Request Received From:  Referral From: Other (Comment)  Focus of Care:  Visited With: Patient   Refer to :  Spiritual Care Assessment    Spiritual Assessment:  Patient Spiritual Care Encounters  Suffering Severity: Moderate  Fear Level: Moderate  Social Interaction: 50% of the time  Care Provided:  Sense of Community and or Denominational Affiliation:  Sikhism    Addressed Needs/Concerns and/or Hoang Through:  Denominational Encounters  Denominational Needs: Prayer  Outcome:  Outcome of Spiritual Care Visit: Comfort/healing presence, [place], Stress management, Trust in self/others/God   Advance Directives:  Spiritual Care Annotation    Annotation:    Patient: Tenzin Cherry    Date: 5/8/2025  Time: 4:30 PM  Total time (min.): 20    I offered instruction on how to reach out to the Spiritual Care Department for future needs. I remain available upon request.  [facility] Department of Spiritual Care Contact #: [phone]  Signed by: Rev. Estella Madrid ThM, MA

## 2025-05-08 NOTE — PROGRESS NOTES
Occupational Therapy    Evaluation    Patient Name: Tenzin Cherry  MRN: [id number]  Today's Date: 5/8/2025  Time Calculation  Start Time: 0940  Stop Time: 1007  Time Calculation (min): 27 min  724/724-A    Assessment  IP OT Assessment  OT Assessment: Patient requires assist for ADLS & functional tranfsfers secondary to limited flexibility s/p THR and impaired balance; patient would benefit from 24 hour assist with O.T. services at a moderate intensity to improve independence with ADLs, transfers & mobility.  Prognosis: Good  Barriers to Discharge Home: Physical needs  Physical Needs: 24hr mobility assistance needed, 24hr ADL assistance needed  End of Session Communication: Bedside nurse  End of Session Patient Position: Up in chair, Alarm on (call light in reach; BLE elevated on footstool)    Plan:  Treatment Interventions: ADL retraining, Functional transfer training, Neuromuscular reeducation, Compensatory technique education  OT Frequency: 3 times per week  OT Discharge Recommendations: Moderate intensity level of continued care (24 hour support for safety)  OT - OK to Discharge: Yes (from an O.T. standpoint)    Subjective     Current Problem:  1. ALEIDA (acute kidney injury)            General:  General  Reason for Referral: OT eval & treat for ADLs/safety (ALEIDA, hypokalemia, anemia)  Referred By: TARYN Guo-CNP  Past Medical History Relevant to Rehab: 5/7/25: abnormal labs.    PMH: L THR, wound infection, had wound vac, MRDD, DM II, HLD, HTN, uterine CA, partial hysterectomy, laryngeal mass, tracheostomy with closure  Family/Caregiver Present: No  Co-Treatment: PT  Co-Treatment Reason: To maximize patient safety & mobility  Prior to Session Communication: Bedside nurse  Patient Position Received: Bed, 2 rail up, Alarm on  General Comment: Patient cleared for therapy by nursing.    Precautions:  Medical Precautions: Fall precautions  Precautions Comment: THR precautions, TTWB LLE, fall/safety,  posey alarm    Vital Signs:  Vital Signs Comment: VSS    Pain:  Pain Assessment  Pain Assessment: 0-10  0-10 (Numeric) Pain Score: 0 - No pain    Objective     Cognition:  Overall Cognitive Status:  (A & O x 3, mod cues for safety/hand placement)        Home Living:  Home Living Comments: [facility] where she [facility] getting therapy s/p THR.  Required 1-2 person assist for ADLs, transfers & mobility; prior to THR, patient was home with family.        ADL:  Grooming Assistance: Stand by  UE Dressing Assistance: Minimal  LE Dressing Assistance: Maximal  Toileting Assistance with Device: Minimal    Activity Tolerance:  Endurance: Endurance does not limit participation in activity    Bed Mobility/Transfers:   Bed Mobility  Bed Mobility: Yes  Bed Mobility 1  Bed Mobility Comments 1: SBA supine -> sit with HOB elevated and use of rail.  Transfers  Transfer: Yes  Transfer 1  Trials/Comments 1: CGA sit to stand from edge of bed & to/from bedside commode.    Ambulation/Gait Training:  Functional Mobility  Functional Mobility Performed: Yes (CGA with wheeled walker)    Sitting Balance:  Dynamic Sitting Balance  Dynamic Sitting-Balance Support: Feet supported  Dynamic Sitting-Level of Assistance: Contact guard    Standing Balance:  Dynamic Standing Balance  Dynamic Standing-Balance Support: Bilateral upper extremity supported  Dynamic Standing-Level of Assistance: Contact guard    Sensation:  Light Touch: No apparent deficits    Strength:  Strength Comments: BUE grossly WFL for ADLs    Coordination:  Movements are Fluid and Coordinated:  (finger opposition WFL to digits 1-4, difficulty to 5th digit)       Outcome Measures: [facility] Daily Activity  Putting on and taking off regular lower body clothing: A lot  Bathing (including washing, rinsing, drying): A lot  Putting on and taking off regular upper body clothing: A little  Toileting, which includes using toilet, bedpan or urinal: A little  Taking care of  personal grooming such as brushing teeth: A little  Eating Meals: A little  Daily Activity - Total Score: 16                       EDUCATION:     Education Documentation  Precautions, taught by Renuka Avalos OT at 5/8/2025 12:30 PM.  Learner: Patient  Readiness: Acceptance  Method: Explanation, Demonstration  Response: Needs Reinforcement, Verbalizes Understanding    ADL Training, taught by Renuka Avalos OT at 5/8/2025 12:30 PM.  Learner: Patient  Readiness: Acceptance  Method: Explanation, Demonstration  Response: Needs Reinforcement, Verbalizes Understanding    Education Comments  No comments found.        Goals:   Encounter Problems       Encounter Problems (Active)       OT Goals       Increase LE dressing & toileting to SBA with adaptive equipment as needed.  (Progressing)       Start:  05/08/25    Expected End:  05/22/25            Increase functional transfers & mobility to/from bed, chair & commode to SBA with DME for safety  (Progressing)       Start:  05/08/25    Expected End:  05/22/25            Demonstrate compliance to post op precautions and safety techs with min cues during ADLs  (Progressing)       Start:  05/08/25    Expected End:  05/22/25

## 2025-05-08 NOTE — SIGNIFICANT EVENT
Notified by pharmacy that patient's vancomycin level is 47.8 this AM. Hold vanco, consult placed to pharmacy to monitor vancomycin levels while patient is hospitalized. Attending to follow.

## 2025-05-08 NOTE — CARE PLAN
The patient's goals for the shift include      The clinical goals for the shift include not to fall    Problem: Pain - Adult  Goal: Verbalizes/displays adequate comfort level or baseline comfort level  Outcome: Progressing     Problem: Safety - Adult  Goal: Free from fall injury  Outcome: Progressing     Problem: Discharge Planning  Goal: Discharge to home or other facility with appropriate resources  Outcome: Progressing     Problem: Chronic Conditions and Co-morbidities  Goal: Patient's chronic conditions and co-morbidity symptoms are monitored and maintained or improved  Outcome: Progressing     Problem: Nutrition  Goal: Nutrient intake appropriate for maintaining nutritional needs  Outcome: Progressing     Problem: Fall/Injury  Goal: Not fall by end of shift  Outcome: Progressing  Goal: Be free from injury by end of the shift  Outcome: Progressing  Goal: Verbalize understanding of personal risk factors for fall in the hospital  Outcome: Progressing  Goal: Verbalize understanding of risk factor reduction measures to prevent injury from fall in the home  Outcome: Progressing  Goal: Use assistive devices by end of the shift  Outcome: Progressing  Goal: Pace activities to prevent fatigue by end of the shift  Outcome: Progressing     Problem: PICC line  Goal: I will remain free from symptoms of infection  Outcome: Progressing     Problem: Skin  Goal: Decreased wound size/increased tissue granulation at next dressing change  Outcome: Progressing  Goal: Participates in plan/prevention/treatment measures  Outcome: Progressing  Goal: Prevent/manage excess moisture  Outcome: Progressing  Goal: Prevent/minimize sheer/friction injuries  Outcome: Progressing  Goal: Promote/optimize nutrition  Outcome: Progressing  Goal: Promote skin healing  Outcome: Progressing

## 2025-05-08 NOTE — PROGRESS NOTES
Physical Therapy    Physical Therapy Evaluation    Patient Name: Tenzin Cherry  MRN: [id number]  Today's Date: 5/8/2025   Time Calculation  Start Time: 0940  Stop Time: 1007  Time Calculation (min): 27 min  724/724-A    Assessment/Plan   PT Assessment  PT Assessment Results: Decreased strength, Decreased endurance, Impaired balance, Decreased mobility, Decreased safety awareness, Orthopedic restrictions  Rehab Prognosis: Good  Barriers to Discharge Home: Caregiver assistance, Physical needs  Caregiver Assistance: Caregiver assistance needed per identified barriers - however, level of patient's required assistance exceeds assistance available at home  Physical Needs: Ambulating household distances limited by function/safety  Evaluation/Treatment Tolerance: Patient limited by fatigue  End of Session Communication: Bedside nurse  Assessment Comment: Continued skilled PT intervention indicated to facilitate increased strength, balance & gait stability  End of Session Patient Position: Up in chair, Alarm on (le's supported on stool, call light in reach)  IP OR SWING BED PT PLAN  Inpatient or Swing Bed: Inpatient  PT Plan  Treatment/Interventions: Bed mobility, Transfer training, Gait training, Balance training, Therapeutic exercise, Therapeutic activity  PT Plan: Ongoing PT  PT Frequency: 3 times per week  PT Discharge Recommendations: Moderate intensity level of continued care  PT Recommended Transfer Status: Assist x1  PT - OK to Discharge: Yes (when cleared by medical team)    Subjective     Current Problem:  1. ALEIDA (acute kidney injury)          Problem List[1]  past medical history significant for MRDD, DM2, HLD, HTN, uterine cancer s/p partial hysterectomy, laryngeal mass s/p tracheostomy with closure, left hip replacement with a recent wound infection requiring a wound VAC and IV vancomycin and Zosyn   General Visit Information:  General  Reason for Referral: PT eval & treat/impaired mobility DX: aleida,  hypokalemia, anemia; 5/7/25 from snf w/ abnormal lab values  Referred By: Paz Abraham CNP  Caregiver Feedback: Per conference w/ RN patient stable to participate in therapy  Co-Treatment: OT  Co-Treatment Reason: to maximize pt safety& mobility  Patient Position Received: Bed, 2 rail up, Alarm off, not on at start of session  General Comment: Pleasant & cooperative, receptive to mobility& instructions; cues for safe mobility techniques & lle ttwb    Home Living:  Home Living  Home Living Comments: admitted from [id number], at baseline lives w/ sister & mother    Prior Level of Function:  Prior Function Per Pt/Caregiver Report  Prior Function Comments: at [facility] assist x1-2 w/ww for mobility, assist for adl; at baseline independent mobility& adl's    Precautions:  Precautions  Precautions Comment: fall, alarm, purewick, IV, PICC,  lt thr ttwb  Pain:  Pain Assessment  Pain Assessment: 0-10  0-10 (Numeric) Pain Score: 0 - No pain    Cognition:  Cognition  Overall Cognitive Status: Within Functional Limits (slow processing, repeated cues for safety)    General Assessments:      Activity Tolerance  Endurance: Decreased tolerance for upright activites  Sensation  Sensation Comment: chronic numbness/tingling feet     Functional Assessments:     Bed Mobility  Bed Mobility:  (sba supine>sit hob elevated)  Transfers  Transfer:  (cga sit<>stand bed/chair/bedside commode; cues for safe hand plcmt & lle ttwb)  Ambulation/Gait Training  Ambulation/Gait Training Performed:  (cga w/ ww 40ftx1, 30ftx2 cues for lle ttwb, assist to manage ww w/ changes in direction, mild instability)        Extremity/Trunk Assessments:        RLE   RLE :  (arom wfl, strength grossly 4/5)  LLE   LLE :  (arom wfl w/ in postop precautions, strength grossly 3+/5; staples lt hip)    Outcome Measures:     [facility] Basic Mobility  Turning from your back to your side while in a flat bed without using bedrails: A little  Moving from lying on your  back to sitting on the side of a flat bed without using bedrails: A little  Moving to and from bed to chair (including a wheelchair): A little  Standing up from a chair using your arms (e.g. wheelchair or bedside chair): A little  To walk in hospital room: A little  Climbing 3-5 steps with railing: Total  Basic Mobility - Total Score: 16     Goals:  Encounter Problems       Encounter Problems (Active)       PT Problem       STG - Pt will transition supine <> sitting with supervision maintaining lt thr precautions (Progressing)       Start:  05/08/25    Expected End:  05/22/25            STG - Pt will transfer STS with supervision   (Progressing)       Start:  05/08/25    Expected End:  05/22/25            STG - Pt will amb >=50' using ww maintaining LLE TTWB with sba  (Progressing)       Start:  05/08/25    Expected End:  05/22/25            STG - Pt will perform 2-3 sets of THR therex x10 to maximize functional strength and independence  (Progressing)       Start:  05/08/25    Expected End:  05/22/25               Pain - Adult            Education Documentation  Mobility Training, taught by Mary Morfin PT at 5/8/2025 12:09 PM.  Learner: Patient  Readiness: Acceptance  Method: Explanation  Response: Verbalizes Understanding, Needs Reinforcement  Comment: safety, use of ww, lle ttwb              [1]   Patient Active Problem List  Diagnosis    Endometrial ca (Multi)    ALEIDA (acute kidney injury)    Hypokalemia    Anemia

## 2025-05-08 NOTE — NURSING NOTE
Received critical Vancomycin trough results from lab of 47.8 at 06:55. Reported results to oncoming nurse Beth CHAO Message sent to Dr. HARRIETT Locke. Unable to reachNP office.

## 2025-05-08 NOTE — PROGRESS NOTES
05/08/25 1545   Discharge Planning   Living Arrangements Other (Comment)  ([facility])   Support Systems Parent;Family members   Assistance Needed ADL's   Type of Residence Skilled nursing facility   Do you have animals or pets at home? No   Who is requesting discharge planning? Provider   Home or Post Acute Services Post acute facilities (Rehab/SNF/etc)   Type of Post Acute Facility Services Skilled nursing   Expected Discharge Disposition SNF   Does the patient need discharge transport arranged? Yes   RoundTrip coordination needed? Yes   Has discharge transport been arranged? No   Intensity of Service   Intensity of Service 0-30 min     Met with patient at bedside. Introduced self and role as care coordinator. Demographics verified. Patients insurance is [employer] and united [employer] dual. Patient is currently being skilled at [facility] rehab. Patient uses a walker. Patient needs assistance with ADL's. Patient would like to return to [facility]. Care coordinators will follow for discharge planning.

## 2025-05-08 NOTE — H&P
History Of Present Illness  Tenzin Cherry is a 49 y.o. female with past medical history significant for MRDD, DM2, HLD, HTN, uterine cancer s/p partial hysterectomy, laryngeal mass s/p tracheostomy with closure, left hip replacement with a recent wound infection requiring a wound VAC and IV vancomycin and Zosyn presenting to emergency department from [address] [place] [address] for evaluation of an elevated vancomycin trough and abnormal labs.  Patient has been receiving vancomycin 1 g every 24 hours as well as Zosyn 3.375 g every 6 hours for a left hip infection after a recent left hip replacement.  Patient had a vancomycin trough drawn and it was elevated as well as labs drawn and was noted to have an elevated BUN and creatinine.  Patient denies recent illness, nausea, vomiting, diarrhea.  Patient denies chest pain, dizziness, shortness of breath.  Patient is alert and oriented x 3.  Sister and mother at bedside.    In ED, glucose 128, potassium 3.1, creatinine 3.24, GFR 17.  Hemoglobin 9.6, hematocrit 28.8.  Patient given normal saline x 1 L bolus.  Blood pressure 159/92, heart rate 89, respirations 16, temperature 36.9 °C, SpO2 98% on room air.  Patient will continue to receive LR at 75 an hour.  Given Tylenol 650 mg p.o. for complaints of left hip pain.  Patient also medicated with 40 mill equivalents potassium p.o.  Inpatient admission to Dr. Toni Locke will continue to follow.  I was asked to do H&P and place initial admission orders.    Prior medical records reviewed by me     Past Medical History  As above  Surgical History  Left hip replacement, partial hysterectomy, laryngoscopy, tracheostomy with closure     Social History  Never smoker, no drug use, no alcohol use  Family History  Cancer, diabetes     Allergies  Levofloxacin and Penicillins    Review of Systems  A 10 point review of systems was completed and is negative except what is listed in HPI  Physical Exam  Constitutional:  "      Appearance: Normal appearance.   HENT:      Mouth/Throat:      Mouth: Mucous membranes are dry.      Pharynx: Oropharynx is clear.   Eyes:      Pupils: Pupils are equal, round, and reactive to light.   Cardiovascular:      Rate and Rhythm: Normal rate and regular rhythm.   Pulmonary:      Effort: Pulmonary effort is normal.      Breath sounds: Rhonchi present.   Abdominal:      General: Bowel sounds are normal.      Palpations: Abdomen is soft.   Musculoskeletal:      Cervical back: Normal range of motion.      Left hip: Tenderness present.   Skin:     General: Skin is warm and dry.      Capillary Refill: Capillary refill takes less than 2 seconds.   Neurological:      Mental Status: She is alert and oriented to person, place, and time. Mental status is at baseline.   Psychiatric:         Mood and Affect: Mood normal.          Last Recorded Vitals  Blood pressure (!) 154/94, pulse 85, temperature 36.7 °C (98.1 °F), resp. rate 16, height 1.448 m (4' 9\"), weight 54.4 kg (120 lb), SpO2 100%.    Results for orders placed or performed during the hospital encounter of 05/07/25 (from the past 24 hours)   CBC and Auto Differential   Result Value Ref Range    WBC 7.2 4.4 - 11.3 x10*3/uL    nRBC 0.0 0.0 - 0.0 /100 WBCs    RBC 3.18 (L) 4.00 - 5.20 x10*6/uL    Hemoglobin 9.6 (L) 12.0 - 16.0 g/dL    Hematocrit 28.8 (L) 36.0 - 46.0 %    MCV 91 80 - 100 fL    MCH 30.2 26.0 - 34.0 pg    MCHC 33.3 32.0 - 36.0 g/dL    RDW 16.3 (H) 11.5 - 14.5 %    Platelets 381 150 - 450 x10*3/uL    Neutrophils % 68.5 40.0 - 80.0 %    Immature Granulocytes %, Automated 0.6 0.0 - 0.9 %    Lymphocytes % 12.7 13.0 - 44.0 %    Monocytes % 14.7 2.0 - 10.0 %    Eosinophils % 2.9 0.0 - 6.0 %    Basophils % 0.6 0.0 - 2.0 %    Neutrophils Absolute 4.90 1.20 - 7.70 x10*3/uL    Immature Granulocytes Absolute, Automated 0.04 0.00 - 0.70 x10*3/uL    Lymphocytes Absolute 0.91 (L) 1.20 - 4.80 x10*3/uL    Monocytes Absolute 1.05 (H) 0.10 - 1.00 x10*3/uL    " Eosinophils Absolute 0.21 0.00 - 0.70 x10*3/uL    Basophils Absolute 0.04 0.00 - 0.10 x10*3/uL   Basic metabolic panel   Result Value Ref Range    Glucose 128 (H) 74 - 99 mg/dL    Sodium 139 136 - 145 mmol/L    Potassium 3.1 (L) 3.5 - 5.3 mmol/L    Chloride 101 98 - 107 mmol/L    Bicarbonate 25 21 - 32 mmol/L    Anion Gap 16 10 - 20 mmol/L    Urea Nitrogen 13 6 - 23 mg/dL    Creatinine 3.24 (H) 0.50 - 1.05 mg/dL    eGFR 17 (L) >60 mL/min/1.73m*2    Calcium 9.7 8.6 - 10.3 mg/dL       Assessment & Plan  ALEIDA (acute kidney injury)    Hypokalemia    Anemia      Yesimar is a 49-year-old female with past medical history of MRDD, recent left hip replacement with postop infection currently receiving IV antibiotics at her skilled nursing facility presenting to emergency department for an elevated vancomycin trough and abnormal labs.  Patient was signed out for evaluation of an abnormal creatinine and elevated vancomycin trough.  Patient denies any pain or distress.  Patient is alert and oriented x 3.  Patient was found to have a glucose of 128, potassium 3.1, creatinine 3.24, GFR 17, hemoglobin 9.6, hematocrit 28.8.  Patient is currently receiving vancomycin 1 g every 24 hours and Zosyn 3.375 g every 6 hours for a postop left hip infection.  Patient received 1 L of normal saline in ED, medicated with Tylenol p.o. and KCl 40 Meq p.o.  Admitted for further medical management.    ALEIDA/hypokalemia/anemia  Inpatient admission to Dr. Toni Locke  NS x 1 L in ED/continue LR at 75 an hour  Hold Vancomycin at this time   K+ 3.1  KCl 40 mEq p.o.  Repeat labs in a.m.  Hgb 9.6  Monitor for bleeding  Trend hemoglobin  History of cancer  Continue renal diet with thin liquids    HTN/HLD/DM2/history of uterine cancer and laryngeal mass/MRDD/left hip replacement s/p postop wound infection  #Chronic conditions  Labs in a.m.  Vancomycin trough in a.m.  Tylenol as needed  Nursing to complete home med rec  Hold home metformin  Insulin  sliding scale  Hypoglycemia protocol  PT/OT    DVT Ppx  SCDs  No indication for chemical prophylaxis  Out of bed with assistance          I spent 45 minutes in the professional and overall care of this patient.  Paz Abraham, TARYN-CNP

## 2025-05-08 NOTE — CONSULTS
Vancomycin Dosing by Pharmacy- INITIAL    Tenzin Cherry is a 49 y.o. year old female who Pharmacy has been consulted for vancomycin dosing for cellulitis, skin and soft tissue. Based on the patient's indication and renal status this patient will be dosed based on a goal trough/random level of 10-15.     Renal function is currently declining. ALEIDA    Visit Vitals  /85 (BP Location: Right arm, Patient Position: Lying)   Pulse 74   Temp 36.4 °C (97.5 °F) (Temporal)   Resp 18        Lab Results   Component Value Date    CREATININE 3.05 (H) 2025    CREATININE 3.24 (H) 2025        Patient weight is as follows:   Vitals:    25 1903   Weight: 54.4 kg (120 lb)         Temp (24hrs), Av.4 °C (97.5 °F), Min:36.1 °C (97 °F), Max:36.7 °C (98.1 °F)         Assessment/Plan     Patient has been receiving vancomycin 1 gm iv q24h at [facility] for a postop left hip infection. On admission to [facility], vancomycin level drawn 25 @ 0559 = 47.8 mg/l. No vancomycin will be given at this time due to supratherapeutic level. Per [facility] ID notes, vancomycin therapy was planned to be continued until 25.  Follow-up level will be ordered on 25 with am labs unless clinically indicated sooner. Pharmacist will determine further dosing based on that level.  Will continue to monitor renal function daily while on vancomycin and order serum creatinine at least every 48 hours if not already ordered.  Follow for continued vancomycin needs, clinical response, and signs/symptoms of toxicity.       Andrez Pruitt, PharmD

## 2025-05-09 LAB
ANION GAP SERPL CALC-SCNC: 15 MMOL/L (ref 10–20)
BUN SERPL-MCNC: 11 MG/DL (ref 6–23)
CALCIUM SERPL-MCNC: 9.8 MG/DL (ref 8.6–10.3)
CHLORIDE SERPL-SCNC: 106 MMOL/L (ref 98–107)
CO2 SERPL-SCNC: 25 MMOL/L (ref 21–32)
CREAT SERPL-MCNC: 2.7 MG/DL (ref 0.5–1.05)
EGFRCR SERPLBLD CKD-EPI 2021: 21 ML/MIN/1.73M*2
GLUCOSE BLD MANUAL STRIP-MCNC: 116 MG/DL (ref 74–99)
GLUCOSE BLD MANUAL STRIP-MCNC: 145 MG/DL (ref 74–99)
GLUCOSE BLD MANUAL STRIP-MCNC: 167 MG/DL (ref 74–99)
GLUCOSE BLD MANUAL STRIP-MCNC: 175 MG/DL (ref 74–99)
GLUCOSE SERPL-MCNC: 103 MG/DL (ref 74–99)
POTASSIUM SERPL-SCNC: 4.3 MMOL/L (ref 3.5–5.3)
SODIUM SERPL-SCNC: 142 MMOL/L (ref 136–145)
STAPHYLOCOCCUS SPEC CULT: NORMAL
VANCOMYCIN SERPL-MCNC: 29.6 UG/ML (ref 5–20)

## 2025-05-09 PROCEDURE — 2500000002 HC RX 250 W HCPCS SELF ADMINISTERED DRUGS (ALT 637 FOR MEDICARE OP, ALT 636 FOR OP/ED): Performed by: NURSE PRACTITIONER

## 2025-05-09 PROCEDURE — 2500000001 HC RX 250 WO HCPCS SELF ADMINISTERED DRUGS (ALT 637 FOR MEDICARE OP): Performed by: INTERNAL MEDICINE

## 2025-05-09 PROCEDURE — 80202 ASSAY OF VANCOMYCIN: CPT

## 2025-05-09 PROCEDURE — 2500000004 HC RX 250 GENERAL PHARMACY W/ HCPCS (ALT 636 FOR OP/ED): Mod: JZ | Performed by: NURSE PRACTITIONER

## 2025-05-09 PROCEDURE — 1100000001 HC PRIVATE ROOM DAILY

## 2025-05-09 PROCEDURE — 82947 ASSAY GLUCOSE BLOOD QUANT: CPT

## 2025-05-09 PROCEDURE — 2500000004 HC RX 250 GENERAL PHARMACY W/ HCPCS (ALT 636 FOR OP/ED): Mod: JZ

## 2025-05-09 PROCEDURE — 82310 ASSAY OF CALCIUM: CPT | Performed by: INTERNAL MEDICINE

## 2025-05-09 PROCEDURE — 2500000005 HC RX 250 GENERAL PHARMACY W/O HCPCS: Performed by: INTERNAL MEDICINE

## 2025-05-09 RX ORDER — CALCIUM CARBONATE 200(500)MG
1000 TABLET,CHEWABLE ORAL EVERY 8 HOURS PRN
COMMUNITY
Start: 2025-04-17

## 2025-05-09 RX ORDER — SODIUM CHLORIDE 9 MG/ML
75 INJECTION, SOLUTION INTRAVENOUS CONTINUOUS
Status: ACTIVE | OUTPATIENT
Start: 2025-05-10 | End: 2025-05-11

## 2025-05-09 RX ORDER — LOSARTAN POTASSIUM 25 MG/1
25 TABLET ORAL
COMMUNITY
Start: 2025-05-02 | End: 2025-05-22 | Stop reason: HOSPADM

## 2025-05-09 RX ORDER — DOCUSATE SODIUM 100 MG/1
100 CAPSULE, LIQUID FILLED ORAL 2 TIMES DAILY
COMMUNITY
Start: 2025-04-17 | End: 2025-05-22 | Stop reason: HOSPADM

## 2025-05-09 RX ORDER — ASPIRIN 81 MG/1
81 TABLET ORAL 2 TIMES DAILY
COMMUNITY
Start: 2025-04-17 | End: 2025-05-22 | Stop reason: HOSPADM

## 2025-05-09 RX ORDER — CALCIUM CARBONATE 200(500)MG
500 TABLET,CHEWABLE ORAL EVERY 8 HOURS PRN
Status: DISCONTINUED | OUTPATIENT
Start: 2025-05-09 | End: 2025-05-15 | Stop reason: SDUPTHER

## 2025-05-09 RX ORDER — METOPROLOL TARTRATE 1 MG/ML
5 INJECTION, SOLUTION INTRAVENOUS ONCE
Status: COMPLETED | OUTPATIENT
Start: 2025-05-09 | End: 2025-05-09

## 2025-05-09 RX ORDER — METFORMIN HYDROCHLORIDE 500 MG/1
500 TABLET, EXTENDED RELEASE ORAL 2 TIMES DAILY
COMMUNITY
Start: 2025-05-06 | End: 2025-05-22 | Stop reason: HOSPADM

## 2025-05-09 RX ORDER — SULFAMETHOXAZOLE AND TRIMETHOPRIM 800; 160 MG/1; MG/1
1 TABLET ORAL
COMMUNITY
Start: 2025-03-26 | End: 2025-05-22 | Stop reason: HOSPADM

## 2025-05-09 RX ORDER — TALC
3 POWDER (GRAM) TOPICAL NIGHTLY
Status: DISCONTINUED | OUTPATIENT
Start: 2025-05-09 | End: 2025-05-18

## 2025-05-09 RX ORDER — PANTOPRAZOLE SODIUM 40 MG/1
40 TABLET, DELAYED RELEASE ORAL
Status: DISCONTINUED | OUTPATIENT
Start: 2025-05-10 | End: 2025-05-11

## 2025-05-09 RX ORDER — TALC
3 POWDER (GRAM) TOPICAL NIGHTLY
COMMUNITY
Start: 2025-01-20 | End: 2026-01-20

## 2025-05-09 RX ORDER — DOCUSATE SODIUM 100 MG/1
100 CAPSULE, LIQUID FILLED ORAL DAILY
Status: DISCONTINUED | OUTPATIENT
Start: 2025-05-09 | End: 2025-05-22 | Stop reason: HOSPADM

## 2025-05-09 RX ORDER — ALBUTEROL SULFATE 90 UG/1
2 INHALANT RESPIRATORY (INHALATION) EVERY 4 HOURS PRN
Status: DISCONTINUED | OUTPATIENT
Start: 2025-05-09 | End: 2025-05-13

## 2025-05-09 RX ORDER — ASPIRIN 81 MG/1
81 TABLET ORAL DAILY
Status: DISCONTINUED | OUTPATIENT
Start: 2025-05-09 | End: 2025-05-22 | Stop reason: HOSPADM

## 2025-05-09 RX ADMIN — ASPIRIN 81 MG: 81 TABLET, COATED ORAL at 12:19

## 2025-05-09 RX ADMIN — Medication 3 MG: at 20:25

## 2025-05-09 RX ADMIN — METOPROLOL TARTRATE 5 MG: 5 INJECTION INTRAVENOUS at 20:25

## 2025-05-09 RX ADMIN — METOPROLOL TARTRATE 5 MG: 5 INJECTION INTRAVENOUS at 05:22

## 2025-05-09 RX ADMIN — INSULIN LISPRO 2 UNITS: 100 INJECTION, SOLUTION INTRAVENOUS; SUBCUTANEOUS at 12:18

## 2025-05-09 RX ADMIN — DOCUSATE SODIUM 100 MG: 100 CAPSULE, LIQUID FILLED ORAL at 12:19

## 2025-05-09 ASSESSMENT — COGNITIVE AND FUNCTIONAL STATUS - GENERAL
DRESSING REGULAR LOWER BODY CLOTHING: A LOT
MOVING TO AND FROM BED TO CHAIR: A LITTLE
TURNING FROM BACK TO SIDE WHILE IN FLAT BAD: A LITTLE
DRESSING REGULAR UPPER BODY CLOTHING: A LOT
DAILY ACTIVITIY SCORE: 14
PERSONAL GROOMING: A LOT
MOVING FROM LYING ON BACK TO SITTING ON SIDE OF FLAT BED WITH BEDRAILS: A LITTLE
TOILETING: A LOT
WALKING IN HOSPITAL ROOM: TOTAL
STANDING UP FROM CHAIR USING ARMS: TOTAL
MOBILITY SCORE: 12
CLIMB 3 TO 5 STEPS WITH RAILING: TOTAL
HELP NEEDED FOR BATHING: A LOT

## 2025-05-09 ASSESSMENT — PAIN SCALES - GENERAL
PAINLEVEL_OUTOF10: 0 - NO PAIN
PAINLEVEL_OUTOF10: 0 - NO PAIN

## 2025-05-09 NOTE — PROGRESS NOTES
Vancomycin Dosing by Pharmacy- FOLLOW UP  Tenzin Cherry is a 49 y.o. year old female who Pharmacy has been consulted for vancomycin dosing for cellulitis, skin and soft tissue. Based on the patient's indication and renal status this patient is being dosed based on a goal trough/random level of 10-15.     Renal function is currently improving.    Current vancomycin dose: HOLDING VANCOMYCIN DUE TO SUPRA-THERAPEUTIC LEVELS. Will need to check with ordering provider prior to restarting vancomycin therapy.     Most recent trough level: 29.6 mcg/mL    Visit Vitals  BP (!) 162/92 (BP Location: Right arm, Patient Position: Lying)   Pulse 78   Temp 36.7 °C (98.1 °F) (Temporal)   Resp 18        Lab Results   Component Value Date    CREATININE 2.70 (H) 2025    CREATININE 3.05 (H) 2025    CREATININE 3.24 (H) 2025        Patient weight is as follows:   Vitals:    25 1903   Weight: 54.4 kg (120 lb)       Cultures:  No results found for the encounter in last 14 days.       I/O last 3 completed shifts:  In: 2302.5 (42.3 mL/kg) [P.O.:300; I.V.:1002.5 (18.4 mL/kg); IV Piggyback:1000]  Out: 3050 (56.1 mL/kg) [Urine:3050 (1.6 mL/kg/hr)]  Dosing Weight: 54.4 kg   I/O during current shift:  No intake/output data recorded.    Temp (24hrs), Av.5 °C (97.7 °F), Min:36.3 °C (97.3 °F), Max:36.7 °C (98.1 °F)      Assessment/Plan    Patient has been receiving vancomycin 1 gm iv q24h at [facility] for a postop left hip infection. On admission to [facility], vancomycin level drawn 25 @ 0559 = 47.8 mg/L, repeat level  AM is 29.6mcg/mL. No vancomycin will be given at this time due to supratherapeutic level. Will need to check with provider prior to restarting vancomycin. Renal function is showing some improvement today.   The next level will be obtained on 5/10/25 with AM labs. May be obtained sooner if clinically indicated.   Will continue to monitor renal function daily while on vancomycin and order serum  creatinine at least every 48 hours if not already ordered.  Follow for continued vancomycin needs, clinical response, and signs/symptoms of toxicity.       Pamela Haas, PharmD, BCPS   5/9/2025 12:32 PM

## 2025-05-09 NOTE — ED PROVIDER NOTES
HPI   Chief Complaint   Patient presents with    Abnormal Labs       The patient is a 49-year-old female presenting today for ALEIDA.  Patient is on IV Vanco for leg wound.  Patient has no complaints today.              Patient History   Medical History[1]  Surgical History[2]  Family History[3]  Social History[4]    Physical Exam   ED Triage Vitals   Temp Heart Rate Resp BP   05/07/25 1903 05/07/25 1903 05/07/25 1903 05/07/25 1903   36.7 °C (98.1 °F) 87 18 (!) 143/91      SpO2 Temp Source Heart Rate Source Patient Position   05/07/25 1903 05/07/25 2228 05/07/25 2030 05/07/25 1903   100 % Temporal Monitor Lying      BP Location FiO2 (%)     05/07/25 1903 --     Left arm        Physical Exam  Vitals and nursing note reviewed.   Constitutional:       Appearance: Normal appearance.   HENT:      Head: Normocephalic and atraumatic.      Nose: Nose normal.      Mouth/Throat:      Mouth: Mucous membranes are moist.   Eyes:      Conjunctiva/sclera: Conjunctivae normal.   Cardiovascular:      Rate and Rhythm: Normal rate and regular rhythm.      Pulses: Normal pulses.      Heart sounds: Normal heart sounds.   Pulmonary:      Effort: Pulmonary effort is normal.      Breath sounds: Normal breath sounds.   Abdominal:      General: Abdomen is flat.      Palpations: Abdomen is soft.      Tenderness: There is no abdominal tenderness. There is no guarding or rebound.   Musculoskeletal:         General: No deformity.   Neurological:      General: No focal deficit present.      Mental Status: She is alert and oriented to person, place, and time. Mental status is at baseline.   Psychiatric:         Mood and Affect: Mood normal.         Behavior: Behavior normal.           ED Course & MDM   Diagnoses as of 05/08/25 2100   ALEIDA (acute kidney injury)                 No data recorded     Yu Coma Scale Score: 15 (05/08/25 0126 : Cherie Navarro RN)                           Medical Decision Making  Patient presents for evaluation of  ALEIDA in the setting of vancomycin use suspect this is the etiology.  She will be admitted for further care and management of her ALEIDA.  Accepted by Dr. Locke    Amount and/or Complexity of Data Reviewed  Labs: ordered.    Risk  Prescription drug management.  Decision regarding hospitalization.        Procedure  Procedures       [1]   Past Medical History:  Diagnosis Date    Uterine cancer (Multi)    [2]   Past Surgical History:  Procedure Laterality Date    HIP SURGERY Left     PARTIAL HYSTERECTOMY     [3]   Family History  Problem Relation Name Age of Onset    Cancer Mother      Cancer Brother      Diabetes Maternal Grandmother     [4]   Social History  Tobacco Use    Smoking status: Never    Smokeless tobacco: Never   Vaping Use    Vaping status: Never Used   Substance Use Topics    Alcohol use: Never    Drug use: Never        Bill Vogt MD  05/08/25 2100

## 2025-05-09 NOTE — H&P
History Of Present Illness  Tenzin Cherry is a 49 y.o. female with past medical history significant for MRDD, DM2, HLD, HTN, uterine cancer s/p partial hysterectomy, laryngeal mass s/p tracheostomy with closure, left hip replacement with a recent wound infection requiring a wound VAC and IV vancomycin and Zosyn presenting to emergency department from [address] [place] [address] for evaluation of an elevated vancomycin trough and abnormal labs.  Patient has been receiving vancomycin 1 g every 24 hours as well as Zosyn 3.375 g every 6 hours for a left hip infection after a recent left hip replacement.  Patient had a vancomycin trough drawn and it was elevated as well as labs drawn and was noted to have an elevated BUN and creatinine.  Patient denies recent illness, nausea, vomiting, diarrhea.  Patient denies chest pain, dizziness, shortness of breath.  Patient is alert and oriented x 3.  Sister and mother at bedside.    In ED, glucose 128, potassium 3.1, creatinine 3.24, GFR 17.  Hemoglobin 9.6, hematocrit 28.8.  Patient given normal saline x 1 L bolus.  Blood pressure 159/92, heart rate 89, respirations 16, temperature 36.9 °C, SpO2 98% on room air.  Patient will continue to receive LR at 75 an hour.  Given Tylenol 650 mg p.o. for complaints of left hip pain.  Patient also medicated with 40 mill equivalents potassium p.o.  Inpatient admission to Dr. Toni Locke will continue to follow.  I was asked to do H&P and place initial admission orders.    Prior medical records reviewed by me     Past Medical History  As above  Surgical History  Left hip replacement, partial hysterectomy, laryngoscopy, tracheostomy with closure     Social History  Never smoker, no drug use, no alcohol use  Family History  Cancer, diabetes     Allergies  Levofloxacin and Penicillins    Review of Systems  A 10 point review of systems was completed and is negative except what is listed in HPI  Physical Exam  Constitutional:  "      Appearance: Normal appearance.   HENT:      Mouth/Throat:      Mouth: Mucous membranes are dry.      Pharynx: Oropharynx is clear.   Eyes:      Pupils: Pupils are equal, round, and reactive to light.   Cardiovascular:      Rate and Rhythm: Normal rate and regular rhythm.   Pulmonary:      Effort: Pulmonary effort is normal.      Breath sounds: Rhonchi present.   Abdominal:      General: Bowel sounds are normal.      Palpations: Abdomen is soft.   Musculoskeletal:      Cervical back: Normal range of motion.      Left hip: Tenderness present.   Skin:     General: Skin is warm and dry.      Capillary Refill: Capillary refill takes less than 2 seconds.   Neurological:      Mental Status: She is alert and oriented to person, place, and time. Mental status is at baseline.   Psychiatric:         Mood and Affect: Mood normal.          Last Recorded Vitals  Blood pressure (!) 168/97, pulse 87, temperature 36.4 °C (97.5 °F), temperature source Temporal, resp. rate 20, height 1.448 m (4' 9\"), weight 54.4 kg (120 lb), SpO2 100%.    Results for orders placed or performed during the hospital encounter of 05/07/25 (from the past 24 hours)   POCT GLUCOSE   Result Value Ref Range    POCT Glucose 149 (H) 74 - 99 mg/dL   CBC   Result Value Ref Range    WBC 6.6 4.4 - 11.3 x10*3/uL    nRBC 0.0 0.0 - 0.0 /100 WBCs    RBC 3.10 (L) 4.00 - 5.20 x10*6/uL    Hemoglobin 9.1 (L) 12.0 - 16.0 g/dL    Hematocrit 28.3 (L) 36.0 - 46.0 %    MCV 91 80 - 100 fL    MCH 29.4 26.0 - 34.0 pg    MCHC 32.2 32.0 - 36.0 g/dL    RDW 16.3 (H) 11.5 - 14.5 %    Platelets 377 150 - 450 x10*3/uL   Basic metabolic panel   Result Value Ref Range    Glucose 95 74 - 99 mg/dL    Sodium 141 136 - 145 mmol/L    Potassium 3.9 3.5 - 5.3 mmol/L    Chloride 106 98 - 107 mmol/L    Bicarbonate 23 21 - 32 mmol/L    Anion Gap 16 10 - 20 mmol/L    Urea Nitrogen 12 6 - 23 mg/dL    Creatinine 3.05 (H) 0.50 - 1.05 mg/dL    eGFR 18 (L) >60 mL/min/1.73m*2    Calcium 9.1 8.6 - 10.3 " mg/dL   Vancomycin, Trough   Result Value Ref Range    Vancomycin, Trough 47.8 (HH) 5.0 - 20.0 ug/mL   POCT GLUCOSE   Result Value Ref Range    POCT Glucose 102 (H) 74 - 99 mg/dL   POCT GLUCOSE   Result Value Ref Range    POCT Glucose 117 (H) 74 - 99 mg/dL   POCT GLUCOSE   Result Value Ref Range    POCT Glucose 137 (H) 74 - 99 mg/dL   POCT GLUCOSE   Result Value Ref Range    POCT Glucose 166 (H) 74 - 99 mg/dL       Assessment & Plan  ALEIDA (acute kidney injury)    Hypokalemia    Anemia      Tenzin is a 49-year-old female with past medical history of MRDD, recent left hip replacement with postop infection currently receiving IV antibiotics at her skilled nursing facility presenting to emergency department for an elevated vancomycin trough and abnormal labs.  Patient was signed out for evaluation of an abnormal creatinine and elevated vancomycin trough.  Patient denies any pain or distress.  Patient is alert and oriented x 3.  Patient was found to have a glucose of 128, potassium 3.1, creatinine 3.24, GFR 17, hemoglobin 9.6, hematocrit 28.8.  Patient is currently receiving vancomycin 1 g every 24 hours and Zosyn 3.375 g every 6 hours for a postop left hip infection.  Patient received 1 L of normal saline in ED, medicated with Tylenol p.o. and KCl 40 Meq p.o.  Admitted for further medical management.    ALEIDA/hypokalemia/anemia  Inpatient admission to Dr. Toni Locke  NS x 1 L in ED/continue LR at 75 an hour  Hold Vancomycin at this time   K+ 3.1  KCl 40 mEq p.o.  Repeat labs in a.m.  Hgb 9.6  Monitor for bleeding  Trend hemoglobin  History of cancer  Continue renal diet with thin liquids    HTN/HLD/DM2/history of uterine cancer and laryngeal mass/MRDD/left hip replacement s/p postop wound infection  #Chronic conditions  Labs in a.m.  Vancomycin trough in a.m.  Tylenol as needed  Nursing to complete home med rec  Hold home metformin  Insulin sliding scale  Hypoglycemia protocol  PT/OT    DVT Ppx  SCDs  No  indication for chemical prophylaxis  Out of bed with assistance          I spent 45 minutes in the professional and overall care of this patient.  Toni Locke, DO

## 2025-05-10 LAB
ANION GAP SERPL CALC-SCNC: 15 MMOL/L (ref 10–20)
BUN SERPL-MCNC: 14 MG/DL (ref 6–23)
CALCIUM SERPL-MCNC: 9.7 MG/DL (ref 8.6–10.3)
CHLORIDE SERPL-SCNC: 105 MMOL/L (ref 98–107)
CHLORIDE UR-SCNC: 109 MMOL/L
CHLORIDE/CREATININE (MMOL/G) IN URINE: 389 MMOL/G CREAT (ref 38–318)
CO2 SERPL-SCNC: 26 MMOL/L (ref 21–32)
CREAT SERPL-MCNC: 2.76 MG/DL (ref 0.5–1.05)
CREAT UR-MCNC: 28 MG/DL (ref 20–320)
EGFRCR SERPLBLD CKD-EPI 2021: 21 ML/MIN/1.73M*2
EOSINOPHIL SMEAR: ABNORMAL
ERYTHROCYTE [DISTWIDTH] IN BLOOD BY AUTOMATED COUNT: 16.4 % (ref 11.5–14.5)
GLUCOSE BLD MANUAL STRIP-MCNC: 109 MG/DL (ref 74–99)
GLUCOSE BLD MANUAL STRIP-MCNC: 125 MG/DL (ref 74–99)
GLUCOSE BLD MANUAL STRIP-MCNC: 129 MG/DL (ref 74–99)
GLUCOSE BLD MANUAL STRIP-MCNC: 152 MG/DL (ref 74–99)
GLUCOSE BLD MANUAL STRIP-MCNC: 175 MG/DL (ref 74–99)
GLUCOSE SERPL-MCNC: 102 MG/DL (ref 74–99)
HCT VFR BLD AUTO: 29.4 % (ref 36–46)
HGB BLD-MCNC: 9.4 G/DL (ref 12–16)
MCH RBC QN AUTO: 29.6 PG (ref 26–34)
MCHC RBC AUTO-ENTMCNC: 32 G/DL (ref 32–36)
MCV RBC AUTO: 93 FL (ref 80–100)
MICROALBUMIN UR-MCNC: 79 MG/L
MICROALBUMIN/CREAT UR: 282.1 UG/MG CREAT
NRBC BLD-RTO: 0 /100 WBCS (ref 0–0)
PLATELET # BLD AUTO: 398 X10*3/UL (ref 150–450)
POTASSIUM SERPL-SCNC: 3.5 MMOL/L (ref 3.5–5.3)
POTASSIUM UR-SCNC: 10 MMOL/L
POTASSIUM/CREAT UR-RTO: 36 MMOL/G CREAT
RBC # BLD AUTO: 3.18 X10*6/UL (ref 4–5.2)
SODIUM SERPL-SCNC: 142 MMOL/L (ref 136–145)
SODIUM UR-SCNC: 109 MMOL/L
SODIUM/CREAT UR-RTO: 389 MMOL/G CREAT
UREA/CREAT UR-SRTO: 3 G/G CREAT
UUN UR-MCNC: 83 MG/DL
WBC # BLD AUTO: 8.8 X10*3/UL (ref 4.4–11.3)

## 2025-05-10 PROCEDURE — 82570 ASSAY OF URINE CREATININE: CPT | Performed by: INTERNAL MEDICINE

## 2025-05-10 PROCEDURE — 2500000005 HC RX 250 GENERAL PHARMACY W/O HCPCS: Performed by: INTERNAL MEDICINE

## 2025-05-10 PROCEDURE — 89190 NASAL SMEAR FOR EOSINOPHILS: CPT | Mod: PARLAB | Performed by: INTERNAL MEDICINE

## 2025-05-10 PROCEDURE — 84540 ASSAY OF URINE/UREA-N: CPT | Performed by: INTERNAL MEDICINE

## 2025-05-10 PROCEDURE — 82947 ASSAY GLUCOSE BLOOD QUANT: CPT

## 2025-05-10 PROCEDURE — 80048 BASIC METABOLIC PNL TOTAL CA: CPT | Performed by: INTERNAL MEDICINE

## 2025-05-10 PROCEDURE — 85027 COMPLETE CBC AUTOMATED: CPT | Performed by: INTERNAL MEDICINE

## 2025-05-10 PROCEDURE — 2500000004 HC RX 250 GENERAL PHARMACY W/ HCPCS (ALT 636 FOR OP/ED): Mod: JZ | Performed by: INTERNAL MEDICINE

## 2025-05-10 PROCEDURE — 1100000001 HC PRIVATE ROOM DAILY

## 2025-05-10 PROCEDURE — 2500000001 HC RX 250 WO HCPCS SELF ADMINISTERED DRUGS (ALT 637 FOR MEDICARE OP): Performed by: INTERNAL MEDICINE

## 2025-05-10 RX ADMIN — Medication 3 MG: at 21:13

## 2025-05-10 RX ADMIN — ASPIRIN 81 MG: 81 TABLET, COATED ORAL at 09:38

## 2025-05-10 RX ADMIN — PANTOPRAZOLE SODIUM 40 MG: 40 TABLET, DELAYED RELEASE ORAL at 09:38

## 2025-05-10 RX ADMIN — SODIUM CHLORIDE 75 ML/HR: 0.9 INJECTION, SOLUTION INTRAVENOUS at 00:35

## 2025-05-10 RX ADMIN — DOCUSATE SODIUM 100 MG: 100 CAPSULE, LIQUID FILLED ORAL at 09:38

## 2025-05-10 ASSESSMENT — COGNITIVE AND FUNCTIONAL STATUS - GENERAL
CLIMB 3 TO 5 STEPS WITH RAILING: A LITTLE
TURNING FROM BACK TO SIDE WHILE IN FLAT BAD: A LITTLE
WALKING IN HOSPITAL ROOM: A LITTLE
STANDING UP FROM CHAIR USING ARMS: A LITTLE
DAILY ACTIVITIY SCORE: 18
PERSONAL GROOMING: A LITTLE
HELP NEEDED FOR BATHING: A LITTLE
DRESSING REGULAR LOWER BODY CLOTHING: A LITTLE
MOVING FROM LYING ON BACK TO SITTING ON SIDE OF FLAT BED WITH BEDRAILS: A LITTLE
TOILETING: A LITTLE
EATING MEALS: A LITTLE
MOVING TO AND FROM BED TO CHAIR: A LITTLE
DRESSING REGULAR UPPER BODY CLOTHING: A LITTLE
MOBILITY SCORE: 18

## 2025-05-10 ASSESSMENT — PAIN SCALES - GENERAL
PAINLEVEL_OUTOF10: 0 - NO PAIN
PAINLEVEL_OUTOF10: 0 - NO PAIN

## 2025-05-10 NOTE — CARE PLAN
The patient's goals for the shift include      The clinical goals for the shift include pt comfort and safety

## 2025-05-10 NOTE — CONSULTS
Reason For Consult  Abnormal renal chemistries    History Of Present Illness  Tenzin Cherry is a 49 y.o. female admitted from skilled nursing facility with abnormal renal chemistries patient had a recent admission to the hospital which she had rhabdomyolysis.  She had removal of a infected arthroplasty 4/11/2025.  Rhabdomyolysis was thought to be secondary to daptomycin.  Patient renal chemistries disclosed a BUN of 14 creatinine 2.76  Patient also had intermittent fevers.  Grade 5 elevation of the liver functions with an AST of 306 and ALT of 182 she also have a hemoglobin of 9.4 with platelet count of 398,000.  She denies dysuria hematuria nocturia, she denies muscle pain she denies shortness of breath she denies abdominal pain or distention  Patient have history of idiopathic thrombocytopenia.  Rhabdomyolysis,  Diabetes  Hypertension  MRDD  Endometrial carcinoma status post radiotherapy and chemotherapy  Laryngeal carcinoma  Transaminitis  Oropharyngeal dysphagia          Past Medical History  See presents illness    Surgical History  She has a past surgical history that includes Partial hysterectomy and Hip surgery (Left).  Infected arthroplasty removal for 1125     Social History  She reports that she has never smoked. She has never used smokeless tobacco. She reports that she does not drink alcohol and does not use drugs.    Family History  Family History[1]  Brother bone cancer  Allergies  Levofloxacin and Penicillins    Review of Systems  10 point review of system performed and negative aside what is mentioned in HPI     Physical Exam  General appearance; alert oriented  HEENT; there is no icterus pupils react to light accommodation  Neck; no JVD no bruit no thyromegaly no adenopathy  Lungs; clear to auscultation and percussion  Heart; regular rate no gallop no rubs no thrills   Abdomen ;active peristalsis, no guarding no rubs  Extremities; no edema  Neurological; no clonus no tremors no  keshav         I&O 24 HR    Intake/Output Summary (Last 24 hours) at 5/10/2025 1229  Last data filed at 5/10/2025 1142  Gross per 24 hour   Intake 833.75 ml   Output --   Net 833.75 ml       Vitals 24 HR  Heart Rate:  [71-82]   Temp:  [36.1 °C (97 °F)-36.9 °C (98.4 °F)]   Resp:  [18-20]   BP: (163-181)/(91-96)   SpO2:  [98 %-99 %]         Relevant Results  Results for orders placed or performed during the hospital encounter of 05/07/25 (from the past 24 hours)   POCT GLUCOSE   Result Value Ref Range    POCT Glucose 145 (H) 74 - 99 mg/dL   POCT GLUCOSE   Result Value Ref Range    POCT Glucose 175 (H) 74 - 99 mg/dL   CBC   Result Value Ref Range    WBC 8.8 4.4 - 11.3 x10*3/uL    nRBC 0.0 0.0 - 0.0 /100 WBCs    RBC 3.18 (L) 4.00 - 5.20 x10*6/uL    Hemoglobin 9.4 (L) 12.0 - 16.0 g/dL    Hematocrit 29.4 (L) 36.0 - 46.0 %    MCV 93 80 - 100 fL    MCH 29.6 26.0 - 34.0 pg    MCHC 32.0 32.0 - 36.0 g/dL    RDW 16.4 (H) 11.5 - 14.5 %    Platelets 398 150 - 450 x10*3/uL   Basic Metabolic Panel   Result Value Ref Range    Glucose 102 (H) 74 - 99 mg/dL    Sodium 142 136 - 145 mmol/L    Potassium 3.5 3.5 - 5.3 mmol/L    Chloride 105 98 - 107 mmol/L    Bicarbonate 26 21 - 32 mmol/L    Anion Gap 15 10 - 20 mmol/L    Urea Nitrogen 14 6 - 23 mg/dL    Creatinine 2.76 (H) 0.50 - 1.05 mg/dL    eGFR 21 (L) >60 mL/min/1.73m*2    Calcium 9.7 8.6 - 10.3 mg/dL   POCT GLUCOSE   Result Value Ref Range    POCT Glucose 109 (H) 74 - 99 mg/dL   POCT GLUCOSE   Result Value Ref Range    POCT Glucose 152 (H) 74 - 99 mg/dL   POCT GLUCOSE   Result Value Ref Range    POCT Glucose 125 (H) 74 - 99 mg/dL            Assessment & Plan  ALEIDA (acute kidney injury)  History of recent rhabdomyolysis  Anemia of chronic disease  History of idiopathic thrombocytopenic purpura  Hypokalemia    Anemia    Plan  No added salt diet  No potassium restriction  Urinary and iron indices  IV fluids  Recheck CPK and myoglobin  Thank you very much for consult    I spent 60  minutes in the professional and overall care of this patient.      Arvin Sharma MD         [1]   Family History  Problem Relation Name Age of Onset    Cancer Mother      Cancer Brother      Diabetes Maternal Grandmother

## 2025-05-10 NOTE — ASSESSMENT & PLAN NOTE
History of recent rhabdomyolysis  Anemia of chronic disease  History of idiopathic thrombocytopenic purpura

## 2025-05-10 NOTE — PROGRESS NOTES
Tenzin Cherry is a 49 y.o. female on day 3 of admission presenting with ALEIDA (acute kidney injury).      Subjective   No events overnight. Patient seen and examined at bedside. She has no complaints.        Objective     Last Recorded Vitals  BP (!) 166/95 (BP Location: Left arm, Patient Position: Lying)   Pulse 80   Temp 36.1 °C (97 °F) (Temporal)   Resp 18   Wt 54.4 kg (120 lb)   SpO2 99%   Intake/Output last 3 Shifts:    Intake/Output Summary (Last 24 hours) at 5/10/2025 1403  Last data filed at 5/10/2025 1142  Gross per 24 hour   Intake 833.75 ml   Output --   Net 833.75 ml       Admission Weight  Weight: 54.4 kg (120 lb) (05/07/25 1903)    Daily Weight  05/07/25 : 54.4 kg (120 lb)    Image Results  XR chest 1 view  Narrative: Interpreted By:  Shirin Porras,   STUDY:  XR CHEST 1 VIEW;  5/8/2025 5:20 am      INDICATION:  Signs/Symptoms:? Confirm PICC Placement.      COMPARISON:  10/28/2024      ACCESSION NUMBER(S):  [id number]      ORDERING CLINICIAN:  LEEROY CUMMINGS      FINDINGS:  AP radiograph of the chest was provided.      Interval placement of a right PICC with the tip projecting over the  lower SVC.      CARDIOMEDIASTINAL SILHOUETTE:  Cardiomediastinal silhouette is normal in size and configuration.      LUNGS:  No consolidation, pulmonary edema, pleural effusion, or pneumothorax.      ABDOMEN:  No remarkable upper abdominal findings.      BONES:  No acute osseous changes.      Impression: Interval placement of a right PICC with the tip projecting over the  lower SVC.      Signed by: Shirin Porras 5/8/2025 8:35 AM  Dictation workstation:   [id number]      Physical Exam  Vitals and nursing note reviewed.       Constitutional:       Appearance: Normal appearance.   HENT:      Mouth/Throat:      Mouth: Mucous membranes are dry.      Pharynx: Oropharynx is clear.   Eyes:      Pupils: Pupils are equal, round, and reactive to light.   Cardiovascular:      Rate and Rhythm: Normal rate and  regular rhythm.   Pulmonary:      Effort: Pulmonary effort is normal.      Breath sounds: Rhonchi present.   Abdominal:      General: Bowel sounds are normal.      Palpations: Abdomen is soft.   Musculoskeletal:      Cervical back: Normal range of motion.      Left hip: Tenderness present.   Skin:     General: Skin is warm and dry.      Capillary Refill: Capillary refill takes less than 2 seconds.   Neurological:      Mental Status: She is alert and oriented to person, place, and time. Mental status is at baseline.   Psychiatric:         Mood and Affect: Mood normal.      Relevant Results                    This patient has a central line   Reason for the central line remaining today? Parenteral nutrition            Assessment & Plan  ALEIDA (acute kidney injury)    Hypokalemia    Anemia    Yesimar is a 49-year-old female with past medical history of MRDD, recent left hip replacement with postop infection currently receiving IV antibiotics at her skilled nursing facility presenting to emergency department for an elevated vancomycin trough and abnormal labs.  Patient was signed out for evaluation of an abnormal creatinine and elevated vancomycin trough.  Patient denies any pain or distress.  Patient is alert and oriented x 3.  Patient was found to have a glucose of 128, potassium 3.1, creatinine 3.24, GFR 17, hemoglobin 9.6, hematocrit 28.8.  Patient is currently receiving vancomycin 1 g every 24 hours and Zosyn 3.375 g every 6 hours for a postop left hip infection.  Patient received 1 L of normal saline in ED, medicated with Tylenol p.o. and KCl 40 Meq p.o.  Admitted for further medical management.     ALEIDA/hypokalemia/anemia  Inpatient admission to Dr. Toni Locke  NS x 1 L in ED/continue LR at 75 an hour  Hold Vancomycin at this time   K+ 3.1  KCl 40 mEq p.o.  Repeat labs in a.m.  Hgb 9.6  Monitor for bleeding  Trend hemoglobin  History of cancer  Continue renal diet with thin liquids     HTN/HLD/DM2/history of  uterine cancer and laryngeal mass/MRDD/left hip replacement s/p postop wound infection  #Chronic conditions  Labs in a.m.  Vancomycin trough in a.m.  Tylenol as needed  Nursing to complete home med rec  Hold home metformin  Insulin sliding scale  Hypoglycemia protocol  PT/OT     DVT Ppx  SCDs  No indication for chemical prophylaxis  Out of bed with assistance     5/9: Creatinine improving. Continue IVF. Repeat labs tomorrow.       Patient fully evaluated  05/10  for    Problem List Items Addressed This Visit       * (Principal) ALEIDA (acute kidney injury) - Primary     Patient seen resting in bed with head of bed elevated, no s/s or c/o acute difficulties at this time.  Vital signs for last 24 hours Temp:  [36.1 °C (97 °F)-36.9 °C (98.4 °F)] 36.1 °C (97 °F)  Heart Rate:  [71-82] 80  Resp:  [18-20] 18  BP: (163-181)/(91-96) 166/95    I/O this shift:  In: 833.8 [I.V.:833.8]  Out: -   Patient still requiring frequent cardiac and SPO2 monitoring. Continue aggressive pulmonary hygiene and oral hygiene. Off loading as tolerated for skin integrity. Medications and labs reviewed-   Results for orders placed or performed during the hospital encounter of 05/07/25 (from the past 24 hours)   POCT GLUCOSE   Result Value Ref Range    POCT Glucose 145 (H) 74 - 99 mg/dL   POCT GLUCOSE   Result Value Ref Range    POCT Glucose 175 (H) 74 - 99 mg/dL   CBC   Result Value Ref Range    WBC 8.8 4.4 - 11.3 x10*3/uL    nRBC 0.0 0.0 - 0.0 /100 WBCs    RBC 3.18 (L) 4.00 - 5.20 x10*6/uL    Hemoglobin 9.4 (L) 12.0 - 16.0 g/dL    Hematocrit 29.4 (L) 36.0 - 46.0 %    MCV 93 80 - 100 fL    MCH 29.6 26.0 - 34.0 pg    MCHC 32.0 32.0 - 36.0 g/dL    RDW 16.4 (H) 11.5 - 14.5 %    Platelets 398 150 - 450 x10*3/uL   Basic Metabolic Panel   Result Value Ref Range    Glucose 102 (H) 74 - 99 mg/dL    Sodium 142 136 - 145 mmol/L    Potassium 3.5 3.5 - 5.3 mmol/L    Chloride 105 98 - 107 mmol/L    Bicarbonate 26 21 - 32 mmol/L    Anion Gap 15 10 - 20 mmol/L     Urea Nitrogen 14 6 - 23 mg/dL    Creatinine 2.76 (H) 0.50 - 1.05 mg/dL    eGFR 21 (L) >60 mL/min/1.73m*2    Calcium 9.7 8.6 - 10.3 mg/dL   POCT GLUCOSE   Result Value Ref Range    POCT Glucose 109 (H) 74 - 99 mg/dL   POCT GLUCOSE   Result Value Ref Range    POCT Glucose 152 (H) 74 - 99 mg/dL   POCT GLUCOSE   Result Value Ref Range    POCT Glucose 125 (H) 74 - 99 mg/dL      Patient recently received an antibiotic (last 12 hours)       None           Plan discussed with interdisciplinary team, will continue to monitor renal chemistries nephrology on the case, appreciate input. Will continue current and repeat labs in the AM.     Discharge planning discussed with patient and care team. Therapy evaluations ordered. Anticipate HHC/SNF at discharge. Patient aware and agreeable to current plan, continue plan as above.     I spent a total of 60 minutes on the date of the service which included preparing to see the patient, face-to-face patient care, completing clinical documentation, obtaining and/or reviewing separately obtained history, performing a medically appropriate examination, counseling and educating the patient/family/caregiver, ordering medications, tests, or procedures, communicating with other HCPs (not separately reported), independently interpreting results (not separately reported), communicating results to the patient/family/caregiver, and care coordination (not separately reported).     Rajani Blum

## 2025-05-10 NOTE — PROGRESS NOTES
Tenzin Cherry is a 49 y.o. female on day 2 of admission presenting with ALEIDA (acute kidney injury).      Subjective   No events overnight. Patient seen and examined at bedside. She has no complaints.        Objective     Last Recorded Vitals  BP (!) 181/96 (BP Location: Left arm, Patient Position: Lying)   Pulse 82   Temp 36.1 °C (97 °F) (Temporal)   Resp 18   Wt 54.4 kg (120 lb)   SpO2 99%   Intake/Output last 3 Shifts:    Intake/Output Summary (Last 24 hours) at 5/9/2025 2341  Last data filed at 5/9/2025 1015  Gross per 24 hour   Intake 300 ml   Output 1100 ml   Net -800 ml       Admission Weight  Weight: 54.4 kg (120 lb) (05/07/25 1903)    Daily Weight  05/07/25 : 54.4 kg (120 lb)    Image Results  XR chest 1 view  Narrative: Interpreted By:  Shirin Porras,   STUDY:  XR CHEST 1 VIEW;  5/8/2025 5:20 am      INDICATION:  Signs/Symptoms:? Confirm PICC Placement.      COMPARISON:  10/28/2024      ACCESSION NUMBER(S):  [id number]      ORDERING CLINICIAN:  LEEROY CUMMINGS      FINDINGS:  AP radiograph of the chest was provided.      Interval placement of a right PICC with the tip projecting over the  lower SVC.      CARDIOMEDIASTINAL SILHOUETTE:  Cardiomediastinal silhouette is normal in size and configuration.      LUNGS:  No consolidation, pulmonary edema, pleural effusion, or pneumothorax.      ABDOMEN:  No remarkable upper abdominal findings.      BONES:  No acute osseous changes.      Impression: Interval placement of a right PICC with the tip projecting over the  lower SVC.      Signed by: Shirin Porras 5/8/2025 8:35 AM  Dictation workstation:   [id number]      Physical Exam  Constitutional:       Appearance: Normal appearance.   HENT:      Mouth/Throat:      Mouth: Mucous membranes are dry.      Pharynx: Oropharynx is clear.   Eyes:      Pupils: Pupils are equal, round, and reactive to light.   Cardiovascular:      Rate and Rhythm: Normal rate and regular rhythm.   Pulmonary:      Effort:  Pulmonary effort is normal.      Breath sounds: Rhonchi present.   Abdominal:      General: Bowel sounds are normal.      Palpations: Abdomen is soft.   Musculoskeletal:      Cervical back: Normal range of motion.      Left hip: Tenderness present.   Skin:     General: Skin is warm and dry.      Capillary Refill: Capillary refill takes less than 2 seconds.   Neurological:      Mental Status: She is alert and oriented to person, place, and time. Mental status is at baseline.   Psychiatric:         Mood and Affect: Mood normal.      Relevant Results                    This patient has a central line   Reason for the central line remaining today? Parenteral nutrition            Assessment & Plan  ALEIDA (acute kidney injury)    Hypokalemia    Anemia    Yesimar is a 49-year-old female with past medical history of MRDD, recent left hip replacement with postop infection currently receiving IV antibiotics at her skilled nursing facility presenting to emergency department for an elevated vancomycin trough and abnormal labs.  Patient was signed out for evaluation of an abnormal creatinine and elevated vancomycin trough.  Patient denies any pain or distress.  Patient is alert and oriented x 3.  Patient was found to have a glucose of 128, potassium 3.1, creatinine 3.24, GFR 17, hemoglobin 9.6, hematocrit 28.8.  Patient is currently receiving vancomycin 1 g every 24 hours and Zosyn 3.375 g every 6 hours for a postop left hip infection.  Patient received 1 L of normal saline in ED, medicated with Tylenol p.o. and KCl 40 Meq p.o.  Admitted for further medical management.     ALEIDA/hypokalemia/anemia  Inpatient admission to Dr. Toni Locke  NS x 1 L in ED/continue LR at 75 an hour  Hold Vancomycin at this time   K+ 3.1  KCl 40 mEq p.o.  Repeat labs in a.m.  Hgb 9.6  Monitor for bleeding  Trend hemoglobin  History of cancer  Continue renal diet with thin liquids     HTN/HLD/DM2/history of uterine cancer and laryngeal  mass/MRDD/left hip replacement s/p postop wound infection  #Chronic conditions  Labs in a.m.  Vancomycin trough in a.m.  Tylenol as needed  Nursing to complete home med rec  Hold home metformin  Insulin sliding scale  Hypoglycemia protocol  PT/OT     DVT Ppx  SCDs  No indication for chemical prophylaxis  Out of bed with assistance     5/9: Creatinine improving. Continue IVF. Repeat labs tomorrow.           Toni Locke DO

## 2025-05-11 VITALS
OXYGEN SATURATION: 99 % | HEART RATE: 81 BPM | HEIGHT: 57 IN | WEIGHT: 120 LBS | BODY MASS INDEX: 25.89 KG/M2 | RESPIRATION RATE: 18 BRPM | SYSTOLIC BLOOD PRESSURE: 146 MMHG | DIASTOLIC BLOOD PRESSURE: 95 MMHG | TEMPERATURE: 98.1 F

## 2025-05-11 LAB
ALBUMIN SERPL BCP-MCNC: 3.2 G/DL (ref 3.4–5)
ALP SERPL-CCNC: 167 U/L (ref 33–110)
ALT SERPL W P-5'-P-CCNC: 29 U/L (ref 7–45)
ANION GAP SERPL CALC-SCNC: 13 MMOL/L (ref 10–20)
AST SERPL W P-5'-P-CCNC: 16 U/L (ref 9–39)
BASOPHILS # BLD AUTO: 0.05 X10*3/UL (ref 0–0.1)
BASOPHILS NFR BLD AUTO: 0.6 %
BILIRUB SERPL-MCNC: 0.4 MG/DL (ref 0–1.2)
BUN SERPL-MCNC: 16 MG/DL (ref 6–23)
CALCIUM SERPL-MCNC: 9.3 MG/DL (ref 8.6–10.3)
CHLORIDE SERPL-SCNC: 105 MMOL/L (ref 98–107)
CK SERPL-CCNC: 20 U/L (ref 0–215)
CO2 SERPL-SCNC: 26 MMOL/L (ref 21–32)
CREAT SERPL-MCNC: 2.7 MG/DL (ref 0.5–1.05)
EGFRCR SERPLBLD CKD-EPI 2021: 21 ML/MIN/1.73M*2
EOSINOPHIL # BLD AUTO: 0.23 X10*3/UL (ref 0–0.7)
EOSINOPHIL NFR BLD AUTO: 2.6 %
ERYTHROCYTE [DISTWIDTH] IN BLOOD BY AUTOMATED COUNT: 16.5 % (ref 11.5–14.5)
GLUCOSE BLD MANUAL STRIP-MCNC: 106 MG/DL (ref 74–99)
GLUCOSE BLD MANUAL STRIP-MCNC: 129 MG/DL (ref 74–99)
GLUCOSE BLD MANUAL STRIP-MCNC: 151 MG/DL (ref 74–99)
GLUCOSE BLD MANUAL STRIP-MCNC: 181 MG/DL (ref 74–99)
GLUCOSE SERPL-MCNC: 113 MG/DL (ref 74–99)
HCT VFR BLD AUTO: 28 % (ref 36–46)
HGB BLD-MCNC: 9.1 G/DL (ref 12–16)
IMM GRANULOCYTES # BLD AUTO: 0.05 X10*3/UL (ref 0–0.7)
IMM GRANULOCYTES NFR BLD AUTO: 0.6 % (ref 0–0.9)
IRON SATN MFR SERPL: 33 % (ref 25–45)
IRON SERPL-MCNC: 58 UG/DL (ref 35–150)
LYMPHOCYTES # BLD AUTO: 1.08 X10*3/UL (ref 1.2–4.8)
LYMPHOCYTES NFR BLD AUTO: 12 %
MCH RBC QN AUTO: 29.8 PG (ref 26–34)
MCHC RBC AUTO-ENTMCNC: 32.5 G/DL (ref 32–36)
MCV RBC AUTO: 92 FL (ref 80–100)
MONOCYTES # BLD AUTO: 0.89 X10*3/UL (ref 0.1–1)
MONOCYTES NFR BLD AUTO: 9.9 %
MYOGLOBIN SERPL-MCNC: 44 UG/L
NEUTROPHILS # BLD AUTO: 6.7 X10*3/UL (ref 1.2–7.7)
NEUTROPHILS NFR BLD AUTO: 74.3 %
NRBC BLD-RTO: 0 /100 WBCS (ref 0–0)
PLATELET # BLD AUTO: 375 X10*3/UL (ref 150–450)
POTASSIUM SERPL-SCNC: 3.7 MMOL/L (ref 3.5–5.3)
PROT SERPL-MCNC: 6 G/DL (ref 6.4–8.2)
PTH-INTACT SERPL-MCNC: 18.2 PG/ML (ref 18.5–88)
RBC # BLD AUTO: 3.05 X10*6/UL (ref 4–5.2)
SODIUM SERPL-SCNC: 140 MMOL/L (ref 136–145)
TIBC SERPL-MCNC: 178 UG/DL (ref 240–445)
UIBC SERPL-MCNC: 120 UG/DL (ref 110–370)
URATE SERPL-MCNC: 6.1 MG/DL (ref 2.3–6.7)
VANCOMYCIN SERPL-MCNC: 21.6 UG/ML (ref 5–20)
WBC # BLD AUTO: 9 X10*3/UL (ref 4.4–11.3)

## 2025-05-11 PROCEDURE — 83874 ASSAY OF MYOGLOBIN: CPT | Mod: PARLAB | Performed by: INTERNAL MEDICINE

## 2025-05-11 PROCEDURE — 83540 ASSAY OF IRON: CPT | Performed by: INTERNAL MEDICINE

## 2025-05-11 PROCEDURE — 2500000005 HC RX 250 GENERAL PHARMACY W/O HCPCS: Performed by: INTERNAL MEDICINE

## 2025-05-11 PROCEDURE — 2500000001 HC RX 250 WO HCPCS SELF ADMINISTERED DRUGS (ALT 637 FOR MEDICARE OP): Performed by: INTERNAL MEDICINE

## 2025-05-11 PROCEDURE — 83970 ASSAY OF PARATHORMONE: CPT | Mod: PARLAB | Performed by: INTERNAL MEDICINE

## 2025-05-11 PROCEDURE — 82947 ASSAY GLUCOSE BLOOD QUANT: CPT

## 2025-05-11 PROCEDURE — 1100000001 HC PRIVATE ROOM DAILY

## 2025-05-11 PROCEDURE — 2500000002 HC RX 250 W HCPCS SELF ADMINISTERED DRUGS (ALT 637 FOR MEDICARE OP, ALT 636 FOR OP/ED): Performed by: NURSE PRACTITIONER

## 2025-05-11 PROCEDURE — 82550 ASSAY OF CK (CPK): CPT | Performed by: INTERNAL MEDICINE

## 2025-05-11 PROCEDURE — 80202 ASSAY OF VANCOMYCIN: CPT

## 2025-05-11 PROCEDURE — 84550 ASSAY OF BLOOD/URIC ACID: CPT | Performed by: INTERNAL MEDICINE

## 2025-05-11 PROCEDURE — 80053 COMPREHEN METABOLIC PANEL: CPT | Performed by: INTERNAL MEDICINE

## 2025-05-11 PROCEDURE — 85025 COMPLETE CBC W/AUTO DIFF WBC: CPT | Performed by: INTERNAL MEDICINE

## 2025-05-11 RX ORDER — FAMOTIDINE 20 MG/1
10 TABLET, FILM COATED ORAL DAILY
Status: DISCONTINUED | OUTPATIENT
Start: 2025-05-11 | End: 2025-05-22 | Stop reason: HOSPADM

## 2025-05-11 RX ORDER — CARVEDILOL 12.5 MG/1
12.5 TABLET ORAL 2 TIMES DAILY
Status: DISCONTINUED | OUTPATIENT
Start: 2025-05-11 | End: 2025-05-22 | Stop reason: HOSPADM

## 2025-05-11 RX ADMIN — INSULIN LISPRO 2 UNITS: 100 INJECTION, SOLUTION INTRAVENOUS; SUBCUTANEOUS at 17:13

## 2025-05-11 RX ADMIN — FAMOTIDINE 10 MG: 20 TABLET, FILM COATED ORAL at 20:23

## 2025-05-11 RX ADMIN — PANTOPRAZOLE SODIUM 40 MG: 40 TABLET, DELAYED RELEASE ORAL at 06:34

## 2025-05-11 RX ADMIN — CARVEDILOL 12.5 MG: 12.5 TABLET, FILM COATED ORAL at 17:14

## 2025-05-11 RX ADMIN — CARVEDILOL 12.5 MG: 12.5 TABLET, FILM COATED ORAL at 21:23

## 2025-05-11 RX ADMIN — Medication 3 MG: at 21:23

## 2025-05-11 RX ADMIN — ASPIRIN 81 MG: 81 TABLET, COATED ORAL at 08:05

## 2025-05-11 RX ADMIN — DOCUSATE SODIUM 100 MG: 100 CAPSULE, LIQUID FILLED ORAL at 08:05

## 2025-05-11 ASSESSMENT — PAIN SCALES - GENERAL
PAINLEVEL_OUTOF10: 0 - NO PAIN
PAINLEVEL_OUTOF10: 0 - NO PAIN

## 2025-05-11 ASSESSMENT — PAIN - FUNCTIONAL ASSESSMENT: PAIN_FUNCTIONAL_ASSESSMENT: 0-10

## 2025-05-11 NOTE — CARE PLAN
The patient's goals for the shift include  none stated    The clinical goals for the shift include free from falls      Problem: Pain - Adult  Goal: Verbalizes/displays adequate comfort level or baseline comfort level  Outcome: Progressing     Problem: Safety - Adult  Goal: Free from fall injury  Outcome: Progressing     Problem: Discharge Planning  Goal: Discharge to home or other facility with appropriate resources  Outcome: Progressing     Problem: Chronic Conditions and Co-morbidities  Goal: Patient's chronic conditions and co-morbidity symptoms are monitored and maintained or improved  Outcome: Progressing     Problem: Nutrition  Goal: Nutrient intake appropriate for maintaining nutritional needs  Outcome: Progressing     Problem: Fall/Injury  Goal: Not fall by end of shift  Outcome: Progressing  Goal: Be free from injury by end of the shift  Outcome: Progressing  Goal: Verbalize understanding of personal risk factors for fall in the hospital  Outcome: Progressing  Goal: Verbalize understanding of risk factor reduction measures to prevent injury from fall in the home  Outcome: Progressing  Goal: Use assistive devices by end of the shift  Outcome: Progressing  Goal: Pace activities to prevent fatigue by end of the shift  Outcome: Progressing     Problem: PICC line  Goal: I will remain free from symptoms of infection  Outcome: Progressing     Problem: Skin  Goal: Participates in plan/prevention/treatment measures  5/10/2025 2018 by Chante Hong RN  Outcome: Progressing  5/10/2025 1959 by Chante Hong RN  Flowsheets (Taken 5/10/2025 1959)  Participates in plan/prevention/treatment measures: Elevate heels  Goal: Prevent/manage excess moisture  5/10/2025 2018 by Chante Hong RN  Outcome: Progressing  5/10/2025 1959 by Chante Hong RN  Flowsheets (Taken 5/10/2025 1959)  Prevent/manage excess moisture:   Moisturize dry skin   Cleanse incontinence/protect with barrier cream  Goal: Prevent/minimize  sheer/friction injuries  5/10/2025 2018 by Chante Hong RN  Outcome: Progressing  5/10/2025 1959 by Chante Hong RN  Flowsheets (Taken 5/10/2025 1959)  Prevent/minimize sheer/friction injuries: Use pull sheet  Goal: Promote/optimize nutrition  5/10/2025 2018 by Chante Hong RN  Outcome: Progressing  5/10/2025 1959 by Chante Hong RN  Flowsheets (Taken 5/10/2025 1959)  Promote/optimize nutrition: Offer water/supplements/favorite foods  Goal: Promote skin healing  5/10/2025 2018 by Chante Hong RN  Outcome: Progressing  5/10/2025 1959 by Chante Hong RN  Flowsheets (Taken 5/10/2025 1959)  Promote skin healing: Assess skin/pad under line(s)/device(s)

## 2025-05-11 NOTE — PROGRESS NOTES
Subjective   Patient had a normal ultrasound of the abdomen on 4/2/2025 kidneys were unremarkable with normal density and no hydronephrosis ;   Today her uric acid is normal 6.1  Creatinine kinase is normal  Iron indices are within normal range  Urine for eosinophils are present.  Albumin creatinine ratio in the urine is elevated to 282.1  Patient blood pressure is elevated today 186/97.  Urinary studies disclose a fractional excretion of sodium was 7.57%  Her fractional excretion of urea a 58.44% is consistent with acute tubular necrosis.  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  Objective          Vitals 24HR  Heart Rate:  [74-87]   Temp:  [36.2 °C (97.2 °F)-36.9 °C (98.4 °F)]   Resp:  [18]   BP: (153-169)/(95-99)   SpO2:  [95 %-99 %]         Intake/Output last 3 Shifts:    Intake/Output Summary (Last 24 hours) at 5/11/2025 1153  Last data filed at 5/11/2025 0857  Gross per 24 hour   Intake 331.25 ml   Output 800 ml   Net -468.75 ml       Physical Exam  General appearance; alert oriented  HEENT; there is no icterus pupils react to light accommodation  Neck; no JVD no bruit no thyromegaly no adenopathy  Lungs; clear to auscultation and percussion  Heart; regular rate no gallop no rubs no thrills   Abdomen ;active peristalsis, no guarding no rubs  Extremities; no edema  Neurological; no clonus no tremors no asterixis  Relevant Results  Results for orders placed or performed during the hospital encounter of 05/07/25 (from the past 24 hours)   POCT GLUCOSE   Result Value Ref Range    POCT Glucose 129 (H) 74 - 99 mg/dL   Urine electrolytes   Result Value Ref Range    Sodium, Urine Random 109 mmol/L    Sodium/Creatinine Ratio 389 Not established. mmol/g Creat    Potassium, Urine Random 10 mmol/L    Potassium/Creatinine Ratio 36 Not established mmol/g Creat    Chloride, Urine Random 109 mmol/L    Chloride/Creatinine Ratio 389 (H) 38 - 318 mmol/g creat     Creatinine, Urine Random 28.0 20.0 - 320.0 mg/dL   Albumin-Creatinine Ratio, Urine Random   Result Value Ref Range    Albumin, Urine Random 79.0 Not established mg/L    Creatinine, Urine Random 28.0 20.0 - 320.0 mg/dL    Albumin/Creatinine Ratio 282.1 (H) <30.0 ug/mg Creat   Urea Nitrogen, Urine Random   Result Value Ref Range    Urea Nitrogen, Urine Random 83 mg/dL    Creatinine, Urine Random 28.0 20.0 - 320.0 mg/dL    Urea Nitrogen/Creatinine Ratio 3.0 Not established. g/g creat   Eosinophil smear   Result Value Ref Range    Eosinophils Rare (N) (none)   POCT GLUCOSE   Result Value Ref Range    POCT Glucose 175 (H) 74 - 99 mg/dL   Creatine Kinase   Result Value Ref Range    Creatine Kinase 20 0 - 215 U/L   Iron and TIBC   Result Value Ref Range    Iron 58 35 - 150 ug/dL    UIBC 120 110 - 370 ug/dL    TIBC 178 (L) 240 - 445 ug/dL    % Saturation 33 25 - 45 %   Uric Acid   Result Value Ref Range    Uric Acid 6.1 2.3 - 6.7 mg/dL   CBC and Auto Differential   Result Value Ref Range    WBC 9.0 4.4 - 11.3 x10*3/uL    nRBC 0.0 0.0 - 0.0 /100 WBCs    RBC 3.05 (L) 4.00 - 5.20 x10*6/uL    Hemoglobin 9.1 (L) 12.0 - 16.0 g/dL    Hematocrit 28.0 (L) 36.0 - 46.0 %    MCV 92 80 - 100 fL    MCH 29.8 26.0 - 34.0 pg    MCHC 32.5 32.0 - 36.0 g/dL    RDW 16.5 (H) 11.5 - 14.5 %    Platelets 375 150 - 450 x10*3/uL    Neutrophils % 74.3 40.0 - 80.0 %    Immature Granulocytes %, Automated 0.6 0.0 - 0.9 %    Lymphocytes % 12.0 13.0 - 44.0 %    Monocytes % 9.9 2.0 - 10.0 %    Eosinophils % 2.6 0.0 - 6.0 %    Basophils % 0.6 0.0 - 2.0 %    Neutrophils Absolute 6.70 1.20 - 7.70 x10*3/uL    Immature Granulocytes Absolute, Automated 0.05 0.00 - 0.70 x10*3/uL    Lymphocytes Absolute 1.08 (L) 1.20 - 4.80 x10*3/uL    Monocytes Absolute 0.89 0.10 - 1.00 x10*3/uL    Eosinophils Absolute 0.23 0.00 - 0.70 x10*3/uL    Basophils Absolute 0.05 0.00 - 0.10 x10*3/uL   Comprehensive Metabolic Panel   Result Value Ref Range    Glucose 113 (H) 74 - 99 mg/dL     Sodium 140 136 - 145 mmol/L    Potassium 3.7 3.5 - 5.3 mmol/L    Chloride 105 98 - 107 mmol/L    Bicarbonate 26 21 - 32 mmol/L    Anion Gap 13 10 - 20 mmol/L    Urea Nitrogen 16 6 - 23 mg/dL    Creatinine 2.70 (H) 0.50 - 1.05 mg/dL    eGFR 21 (L) >60 mL/min/1.73m*2    Calcium 9.3 8.6 - 10.3 mg/dL    Albumin 3.2 (L) 3.4 - 5.0 g/dL    Alkaline Phosphatase 167 (H) 33 - 110 U/L    Total Protein 6.0 (L) 6.4 - 8.2 g/dL    AST 16 9 - 39 U/L    Bilirubin, Total 0.4 0.0 - 1.2 mg/dL    ALT 29 7 - 45 U/L   POCT GLUCOSE   Result Value Ref Range    POCT Glucose 106 (H) 74 - 99 mg/dL                       Assessment & Plan  ALEIDA (acute kidney injury)  History of recent rhabdomyolysis  Anemia of chronic disease  History of idiopathic thrombocytopenic purpura  Hypertension  Overt proteinuria  Hypokalemia    Anemia  Plan  Duplex sonography of the renal arteries  Will check renin and aldosterone levels  As needed metoprolol or hydralazine for blood pressure control.  Labs in the morning         I spent 30 minutes in the professional and overall care of this patient.      Arvin Sharma MD

## 2025-05-11 NOTE — NURSING NOTE
Patient has a DL RU chest PICC.  Labs collected by unit this AM.  Red lumen flushed with blood return.  Purple lumen unable to flush with no blood return.      Order to send urine carried out.  Light yellow, clear, odorless urine collected from purewick container.

## 2025-05-11 NOTE — PROGRESS NOTES
Vancomycin Dosing by Pharmacy- FOLLOW UP    Tenzin Cherry is a 49 y.o. year old female who Pharmacy has been consulted for vancomycin dosing for cellulitis, skin and soft tissue. Based on the patient's indication and renal status this patient is being dosed based on a goal trough/random level of 10-15.     Renal function is currently improving.    Holding vancomycin due to supra-therapeutic levels. Will need to check with ordering provider prior to restarting vancomycin therapy.     Most recent random level: 21.6 mcg/mL    Visit Vitals  BP (!) 159/99   Pulse 84   Temp 36.2 °C (97.2 °F)   Resp 18        Lab Results   Component Value Date    CREATININE 2.70 (H) 2025    CREATININE 2.76 (H) 05/10/2025    CREATININE 2.70 (H) 2025    CREATININE 3.05 (H) 2025        Patient weight is as follows:   Vitals:    25 1903   Weight: 54.4 kg (120 lb)       Cultures:  No results found for the encounter in last 14 days.       I/O last 3 completed shifts:  In: 1045 (19.2 mL/kg) [I.V.:1045 (19.2 mL/kg)]  Out: 800 (14.7 mL/kg) [Urine:800 (0.4 mL/kg/hr)]  Dosing Weight: 54.4 kg   I/O during current shift:  I/O this shift:  In: 120 [P.O.:120]  Out: -     Temp (24hrs), Av.6 °C (97.8 °F), Min:36.2 °C (97.2 °F), Max:36.9 °C (98.4 °F)      Assessment/Plan    Above goal random/trough level, will continue to hold. Vancomycin level will likely be therapeutic tomorrow, will need to check with provider prior to restarting vancomycin.   The next level will be obtained on  with AM labs. May be obtained sooner if clinically indicated.   Will continue to monitor renal function daily while on vancomycin and order serum creatinine at least every 48 hours if not already ordered.  Follow for continued vancomycin needs, clinical response, and signs/symptoms of toxicity.       Hailey Dc, PharmD

## 2025-05-11 NOTE — PROGRESS NOTES
Tenzin Cherry is a 49 y.o. female on day 4 of admission presenting with ALEIDA (acute kidney injury).      Subjective   No events overnight. Patient seen and examined at bedside. She has no complaints.        Objective     Last Recorded Vitals  BP (!) 159/99   Pulse 84   Temp 36.2 °C (97.2 °F)   Resp 18   Wt 54.4 kg (120 lb)   SpO2 98%   Intake/Output last 3 Shifts:    Intake/Output Summary (Last 24 hours) at 5/11/2025 1520  Last data filed at 5/11/2025 0857  Gross per 24 hour   Intake 120 ml   Output 800 ml   Net -680 ml       Admission Weight  Weight: 54.4 kg (120 lb) (05/07/25 1903)    Daily Weight  05/07/25 : 54.4 kg (120 lb)    Image Results  XR chest 1 view  Narrative: Interpreted By:  Shirin Porras,   STUDY:  XR CHEST 1 VIEW;  5/8/2025 5:20 am      INDICATION:  Signs/Symptoms:? Confirm PICC Placement.      COMPARISON:  10/28/2024      ACCESSION NUMBER(S):  [id number]      ORDERING CLINICIAN:  LEEROY CUMMINGS      FINDINGS:  AP radiograph of the chest was provided.      Interval placement of a right PICC with the tip projecting over the  lower SVC.      CARDIOMEDIASTINAL SILHOUETTE:  Cardiomediastinal silhouette is normal in size and configuration.      LUNGS:  No consolidation, pulmonary edema, pleural effusion, or pneumothorax.      ABDOMEN:  No remarkable upper abdominal findings.      BONES:  No acute osseous changes.      Impression: Interval placement of a right PICC with the tip projecting over the  lower SVC.      Signed by: Shirin Porras 5/8/2025 8:35 AM  Dictation workstation:   [id number]      Physical Exam  Vitals and nursing note reviewed.       Constitutional:       Appearance: Normal appearance.   HENT:      Mouth/Throat:      Mouth: Mucous membranes are dry.      Pharynx: Oropharynx is clear.   Eyes:      Pupils: Pupils are equal, round, and reactive to light.   Cardiovascular:      Rate and Rhythm: Normal rate and regular rhythm.   Pulmonary:      Effort: Pulmonary effort is  normal.      Breath sounds: Rhonchi present.   Abdominal:      General: Bowel sounds are normal.      Palpations: Abdomen is soft.   Musculoskeletal:      Cervical back: Normal range of motion.      Left hip: Tenderness present.   Skin:     General: Skin is warm and dry.      Capillary Refill: Capillary refill takes less than 2 seconds.   Neurological:      Mental Status: She is alert and oriented to person, place, and time. Mental status is at baseline.   Psychiatric:         Mood and Affect: Mood normal.      Relevant Results                    This patient has a central line   Reason for the central line remaining today? Parenteral nutrition            Assessment & Plan  ALEIDA (acute kidney injury)    Hypokalemia    Anemia    Yesimar is a 49-year-old female with past medical history of MRDD, recent left hip replacement with postop infection currently receiving IV antibiotics at her skilled nursing facility presenting to emergency department for an elevated vancomycin trough and abnormal labs.  Patient was signed out for evaluation of an abnormal creatinine and elevated vancomycin trough.  Patient denies any pain or distress.  Patient is alert and oriented x 3.  Patient was found to have a glucose of 128, potassium 3.1, creatinine 3.24, GFR 17, hemoglobin 9.6, hematocrit 28.8.  Patient is currently receiving vancomycin 1 g every 24 hours and Zosyn 3.375 g every 6 hours for a postop left hip infection.  Patient received 1 L of normal saline in ED, medicated with Tylenol p.o. and KCl 40 Meq p.o.  Admitted for further medical management.     ALEIDA/hypokalemia/anemia  Inpatient admission to Dr. Toni Locke  NS x 1 L in ED/continue LR at 75 an hour  Hold Vancomycin at this time   K+ 3.1  KCl 40 mEq p.o.  Repeat labs in a.m.  Hgb 9.6  Monitor for bleeding  Trend hemoglobin  History of cancer  Continue renal diet with thin liquids     HTN/HLD/DM2/history of uterine cancer and laryngeal mass/MRDD/left hip replacement  s/p postop wound infection  #Chronic conditions  Labs in a.m.  Vancomycin trough in a.m.  Tylenol as needed  Nursing to complete home med rec  Hold home metformin  Insulin sliding scale  Hypoglycemia protocol  PT/OT     DVT Ppx  SCDs  No indication for chemical prophylaxis  Out of bed with assistance     5/9: Creatinine improving. Continue IVF. Repeat labs tomorrow.       Patient fully evaluated  05/10  for    Problem List Items Addressed This Visit       * (Principal) ALEIDA (acute kidney injury) - Primary    Relevant Orders    Renal artery duplex complete    Hypokalemia    Relevant Orders    Renal artery duplex complete     Other Visit Diagnoses         Hypertensive chronic kidney disease with stage 1 through stage 4 chronic kidney disease, or unspecified chronic kidney disease        Relevant Orders    Renal artery duplex complete          Patient seen resting in bed with head of bed elevated, no s/s or c/o acute difficulties at this time.  Vital signs for last 24 hours Temp:  [36.2 °C (97.2 °F)-36.9 °C (98.4 °F)] 36.2 °C (97.2 °F)  Heart Rate:  [74-87] 84  Resp:  [18] 18  BP: (153-169)/(95-99) 159/99    I/O this shift:  In: 120 [P.O.:120]  Out: -   Patient still requiring frequent cardiac and SPO2 monitoring. Continue aggressive pulmonary hygiene and oral hygiene. Off loading as tolerated for skin integrity. Medications and labs reviewed-   Results for orders placed or performed during the hospital encounter of 05/07/25 (from the past 24 hours)   POCT GLUCOSE   Result Value Ref Range    POCT Glucose 129 (H) 74 - 99 mg/dL   Urine electrolytes   Result Value Ref Range    Sodium, Urine Random 109 mmol/L    Sodium/Creatinine Ratio 389 Not established. mmol/g Creat    Potassium, Urine Random 10 mmol/L    Potassium/Creatinine Ratio 36 Not established mmol/g Creat    Chloride, Urine Random 109 mmol/L    Chloride/Creatinine Ratio 389 (H) 38 - 318 mmol/g creat    Creatinine, Urine Random 28.0 20.0 - 320.0 mg/dL    Albumin-Creatinine Ratio, Urine Random   Result Value Ref Range    Albumin, Urine Random 79.0 Not established mg/L    Creatinine, Urine Random 28.0 20.0 - 320.0 mg/dL    Albumin/Creatinine Ratio 282.1 (H) <30.0 ug/mg Creat   Urea Nitrogen, Urine Random   Result Value Ref Range    Urea Nitrogen, Urine Random 83 mg/dL    Creatinine, Urine Random 28.0 20.0 - 320.0 mg/dL    Urea Nitrogen/Creatinine Ratio 3.0 Not established. g/g creat   Eosinophil smear   Result Value Ref Range    Eosinophils Rare (N) (none)   POCT GLUCOSE   Result Value Ref Range    POCT Glucose 175 (H) 74 - 99 mg/dL   Creatine Kinase   Result Value Ref Range    Creatine Kinase 20 0 - 215 U/L   Iron and TIBC   Result Value Ref Range    Iron 58 35 - 150 ug/dL    UIBC 120 110 - 370 ug/dL    TIBC 178 (L) 240 - 445 ug/dL    % Saturation 33 25 - 45 %   Uric Acid   Result Value Ref Range    Uric Acid 6.1 2.3 - 6.7 mg/dL   CBC and Auto Differential   Result Value Ref Range    WBC 9.0 4.4 - 11.3 x10*3/uL    nRBC 0.0 0.0 - 0.0 /100 WBCs    RBC 3.05 (L) 4.00 - 5.20 x10*6/uL    Hemoglobin 9.1 (L) 12.0 - 16.0 g/dL    Hematocrit 28.0 (L) 36.0 - 46.0 %    MCV 92 80 - 100 fL    MCH 29.8 26.0 - 34.0 pg    MCHC 32.5 32.0 - 36.0 g/dL    RDW 16.5 (H) 11.5 - 14.5 %    Platelets 375 150 - 450 x10*3/uL    Neutrophils % 74.3 40.0 - 80.0 %    Immature Granulocytes %, Automated 0.6 0.0 - 0.9 %    Lymphocytes % 12.0 13.0 - 44.0 %    Monocytes % 9.9 2.0 - 10.0 %    Eosinophils % 2.6 0.0 - 6.0 %    Basophils % 0.6 0.0 - 2.0 %    Neutrophils Absolute 6.70 1.20 - 7.70 x10*3/uL    Immature Granulocytes Absolute, Automated 0.05 0.00 - 0.70 x10*3/uL    Lymphocytes Absolute 1.08 (L) 1.20 - 4.80 x10*3/uL    Monocytes Absolute 0.89 0.10 - 1.00 x10*3/uL    Eosinophils Absolute 0.23 0.00 - 0.70 x10*3/uL    Basophils Absolute 0.05 0.00 - 0.10 x10*3/uL   Comprehensive Metabolic Panel   Result Value Ref Range    Glucose 113 (H) 74 - 99 mg/dL    Sodium 140 136 - 145 mmol/L    Potassium 3.7  3.5 - 5.3 mmol/L    Chloride 105 98 - 107 mmol/L    Bicarbonate 26 21 - 32 mmol/L    Anion Gap 13 10 - 20 mmol/L    Urea Nitrogen 16 6 - 23 mg/dL    Creatinine 2.70 (H) 0.50 - 1.05 mg/dL    eGFR 21 (L) >60 mL/min/1.73m*2    Calcium 9.3 8.6 - 10.3 mg/dL    Albumin 3.2 (L) 3.4 - 5.0 g/dL    Alkaline Phosphatase 167 (H) 33 - 110 U/L    Total Protein 6.0 (L) 6.4 - 8.2 g/dL    AST 16 9 - 39 U/L    Bilirubin, Total 0.4 0.0 - 1.2 mg/dL    ALT 29 7 - 45 U/L   Vancomycin   Result Value Ref Range    Vancomycin 21.6 (H) 5.0 - 20.0 ug/mL   POCT GLUCOSE   Result Value Ref Range    POCT Glucose 106 (H) 74 - 99 mg/dL   POCT GLUCOSE   Result Value Ref Range    POCT Glucose 129 (H) 74 - 99 mg/dL      Patient recently received an antibiotic (last 12 hours)       None           Plan discussed with interdisciplinary team, will continue to monitor renal chemistries nephrology on the case, appreciate input. Will continue current and repeat labs in the AM.     Discharge planning discussed with patient and care team. Therapy evaluations ordered. Anticipate HHC/SNF at discharge. Patient aware and agreeable to current plan, continue plan as above.     I spent a total of 60 minutes on the date of the service which included preparing to see the patient, face-to-face patient care, completing clinical documentation, obtaining and/or reviewing separately obtained history, performing a medically appropriate examination, counseling and educating the patient/family/caregiver, ordering medications, tests, or procedures, communicating with other HCPs (not separately reported), independently interpreting results (not separately reported), communicating results to the patient/family/caregiver, and care coordination (not separately reported).     Patient fully evaluated  05/11  for    Problem List Items Addressed This Visit       * (Principal) ALEIDA (acute kidney injury) - Primary    Relevant Orders    Renal artery duplex complete    Hypokalemia     Relevant Orders    Renal artery duplex complete     Other Visit Diagnoses         Hypertensive chronic kidney disease with stage 1 through stage 4 chronic kidney disease, or unspecified chronic kidney disease        Relevant Orders    Renal artery duplex complete          Patient seen resting in bed with head of bed elevated, no s/s or c/o acute difficulties at this time.  Vital signs for last 24 hours Temp:  [36.2 °C (97.2 °F)-36.9 °C (98.4 °F)] 36.2 °C (97.2 °F)  Heart Rate:  [74-87] 84  Resp:  [18] 18  BP: (153-169)/(95-99) 159/99    I/O this shift:  In: 120 [P.O.:120]  Out: -   Patient still requiring frequent cardiac and SPO2 monitoring. Continue aggressive pulmonary hygiene and oral hygiene. Off loading as tolerated for skin integrity. Medications and labs reviewed-   Results for orders placed or performed during the hospital encounter of 05/07/25 (from the past 24 hours)   POCT GLUCOSE   Result Value Ref Range    POCT Glucose 129 (H) 74 - 99 mg/dL   Urine electrolytes   Result Value Ref Range    Sodium, Urine Random 109 mmol/L    Sodium/Creatinine Ratio 389 Not established. mmol/g Creat    Potassium, Urine Random 10 mmol/L    Potassium/Creatinine Ratio 36 Not established mmol/g Creat    Chloride, Urine Random 109 mmol/L    Chloride/Creatinine Ratio 389 (H) 38 - 318 mmol/g creat    Creatinine, Urine Random 28.0 20.0 - 320.0 mg/dL   Albumin-Creatinine Ratio, Urine Random   Result Value Ref Range    Albumin, Urine Random 79.0 Not established mg/L    Creatinine, Urine Random 28.0 20.0 - 320.0 mg/dL    Albumin/Creatinine Ratio 282.1 (H) <30.0 ug/mg Creat   Urea Nitrogen, Urine Random   Result Value Ref Range    Urea Nitrogen, Urine Random 83 mg/dL    Creatinine, Urine Random 28.0 20.0 - 320.0 mg/dL    Urea Nitrogen/Creatinine Ratio 3.0 Not established. g/g creat   Eosinophil smear   Result Value Ref Range    Eosinophils Rare (N) (none)   POCT GLUCOSE   Result Value Ref Range    POCT Glucose 175 (H) 74 - 99 mg/dL    Creatine Kinase   Result Value Ref Range    Creatine Kinase 20 0 - 215 U/L   Iron and TIBC   Result Value Ref Range    Iron 58 35 - 150 ug/dL    UIBC 120 110 - 370 ug/dL    TIBC 178 (L) 240 - 445 ug/dL    % Saturation 33 25 - 45 %   Uric Acid   Result Value Ref Range    Uric Acid 6.1 2.3 - 6.7 mg/dL   CBC and Auto Differential   Result Value Ref Range    WBC 9.0 4.4 - 11.3 x10*3/uL    nRBC 0.0 0.0 - 0.0 /100 WBCs    RBC 3.05 (L) 4.00 - 5.20 x10*6/uL    Hemoglobin 9.1 (L) 12.0 - 16.0 g/dL    Hematocrit 28.0 (L) 36.0 - 46.0 %    MCV 92 80 - 100 fL    MCH 29.8 26.0 - 34.0 pg    MCHC 32.5 32.0 - 36.0 g/dL    RDW 16.5 (H) 11.5 - 14.5 %    Platelets 375 150 - 450 x10*3/uL    Neutrophils % 74.3 40.0 - 80.0 %    Immature Granulocytes %, Automated 0.6 0.0 - 0.9 %    Lymphocytes % 12.0 13.0 - 44.0 %    Monocytes % 9.9 2.0 - 10.0 %    Eosinophils % 2.6 0.0 - 6.0 %    Basophils % 0.6 0.0 - 2.0 %    Neutrophils Absolute 6.70 1.20 - 7.70 x10*3/uL    Immature Granulocytes Absolute, Automated 0.05 0.00 - 0.70 x10*3/uL    Lymphocytes Absolute 1.08 (L) 1.20 - 4.80 x10*3/uL    Monocytes Absolute 0.89 0.10 - 1.00 x10*3/uL    Eosinophils Absolute 0.23 0.00 - 0.70 x10*3/uL    Basophils Absolute 0.05 0.00 - 0.10 x10*3/uL   Comprehensive Metabolic Panel   Result Value Ref Range    Glucose 113 (H) 74 - 99 mg/dL    Sodium 140 136 - 145 mmol/L    Potassium 3.7 3.5 - 5.3 mmol/L    Chloride 105 98 - 107 mmol/L    Bicarbonate 26 21 - 32 mmol/L    Anion Gap 13 10 - 20 mmol/L    Urea Nitrogen 16 6 - 23 mg/dL    Creatinine 2.70 (H) 0.50 - 1.05 mg/dL    eGFR 21 (L) >60 mL/min/1.73m*2    Calcium 9.3 8.6 - 10.3 mg/dL    Albumin 3.2 (L) 3.4 - 5.0 g/dL    Alkaline Phosphatase 167 (H) 33 - 110 U/L    Total Protein 6.0 (L) 6.4 - 8.2 g/dL    AST 16 9 - 39 U/L    Bilirubin, Total 0.4 0.0 - 1.2 mg/dL    ALT 29 7 - 45 U/L   Vancomycin   Result Value Ref Range    Vancomycin 21.6 (H) 5.0 - 20.0 ug/mL   POCT GLUCOSE   Result Value Ref Range    POCT Glucose 106  (H) 74 - 99 mg/dL   POCT GLUCOSE   Result Value Ref Range    POCT Glucose 129 (H) 74 - 99 mg/dL      Patient recently received an antibiotic (last 12 hours)       None        Magic cup with meals ordered. Plan discussed with interdisciplinary team, hemoglobin @ 9.1 slight drop from 9.4, will continue to monitor renal chemistries nephrology on the case, appreciate input. Will continue current and repeat labs in the AM.     Discharge planning discussed with patient and care team. Therapy evaluations ordered. Anticipate HHC/SNF at discharge. Patient aware and agreeable to current plan, continue plan as above.     I spent a total of 60 minutes on the date of the service which included preparing to see the patient, face-to-face patient care, completing clinical documentation, obtaining and/or reviewing separately obtained history, performing a medically appropriate examination, counseling and educating the patient/family/caregiver, ordering medications, tests, or procedures, communicating with other HCPs (not separately reported), independently interpreting results (not separately reported), communicating results to the patient/family/caregiver, and care coordination (not separately reported).     Rajani Blum

## 2025-05-11 NOTE — ASSESSMENT & PLAN NOTE
History of recent rhabdomyolysis  Anemia of chronic disease  History of idiopathic thrombocytopenic purpura  Hypertension  Overt proteinuria

## 2025-05-11 NOTE — ASSESSMENT & PLAN NOTE
Plan  Duplex sonography of the renal arteries  Will check renin and aldosterone levels  As needed metoprolol or hydralazine for blood pressure control.

## 2025-05-12 ENCOUNTER — APPOINTMENT (OUTPATIENT)
Dept: VASCULAR MEDICINE | Facility: HOSPITAL | Age: 50
DRG: 690 | End: 2025-05-12
Payer: MEDICARE

## 2025-05-12 LAB
ALBUMIN SERPL BCP-MCNC: 3.2 G/DL (ref 3.4–5)
ALP SERPL-CCNC: 161 U/L (ref 33–110)
ALT SERPL W P-5'-P-CCNC: 24 U/L (ref 7–45)
ANION GAP SERPL CALC-SCNC: 15 MMOL/L (ref 10–20)
AST SERPL W P-5'-P-CCNC: 16 U/L (ref 9–39)
BASOPHILS # BLD AUTO: 0.06 X10*3/UL (ref 0–0.1)
BASOPHILS NFR BLD AUTO: 0.9 %
BILIRUB SERPL-MCNC: 0.3 MG/DL (ref 0–1.2)
BUN SERPL-MCNC: 16 MG/DL (ref 6–23)
CALCIUM SERPL-MCNC: 9.2 MG/DL (ref 8.6–10.3)
CHLORIDE SERPL-SCNC: 103 MMOL/L (ref 98–107)
CO2 SERPL-SCNC: 25 MMOL/L (ref 21–32)
CREAT SERPL-MCNC: 2.78 MG/DL (ref 0.5–1.05)
EGFRCR SERPLBLD CKD-EPI 2021: 21 ML/MIN/1.73M*2
EOSINOPHIL # BLD AUTO: 0.19 X10*3/UL (ref 0–0.7)
EOSINOPHIL NFR BLD AUTO: 2.7 %
ERYTHROCYTE [DISTWIDTH] IN BLOOD BY AUTOMATED COUNT: 16.4 % (ref 11.5–14.5)
GLUCOSE BLD MANUAL STRIP-MCNC: 129 MG/DL (ref 74–99)
GLUCOSE BLD MANUAL STRIP-MCNC: 130 MG/DL (ref 74–99)
GLUCOSE BLD MANUAL STRIP-MCNC: 167 MG/DL (ref 74–99)
GLUCOSE BLD MANUAL STRIP-MCNC: 176 MG/DL (ref 74–99)
GLUCOSE SERPL-MCNC: 155 MG/DL (ref 74–99)
HCT VFR BLD AUTO: 27.5 % (ref 36–46)
HGB BLD-MCNC: 9 G/DL (ref 12–16)
IMM GRANULOCYTES # BLD AUTO: 0.04 X10*3/UL (ref 0–0.7)
IMM GRANULOCYTES NFR BLD AUTO: 0.6 % (ref 0–0.9)
LYMPHOCYTES # BLD AUTO: 1 X10*3/UL (ref 1.2–4.8)
LYMPHOCYTES NFR BLD AUTO: 14.3 %
MAGNESIUM SERPL-MCNC: 1.85 MG/DL (ref 1.6–2.4)
MCH RBC QN AUTO: 30.3 PG (ref 26–34)
MCHC RBC AUTO-ENTMCNC: 32.7 G/DL (ref 32–36)
MCV RBC AUTO: 93 FL (ref 80–100)
MONOCYTES # BLD AUTO: 0.82 X10*3/UL (ref 0.1–1)
MONOCYTES NFR BLD AUTO: 11.7 %
NEUTROPHILS # BLD AUTO: 4.88 X10*3/UL (ref 1.2–7.7)
NEUTROPHILS NFR BLD AUTO: 69.8 %
NRBC BLD-RTO: 0 /100 WBCS (ref 0–0)
PHOSPHATE SERPL-MCNC: 4.5 MG/DL (ref 2.5–4.9)
PLATELET # BLD AUTO: 339 X10*3/UL (ref 150–450)
POTASSIUM SERPL-SCNC: 3.7 MMOL/L (ref 3.5–5.3)
PROT SERPL-MCNC: 6 G/DL (ref 6.4–8.2)
RBC # BLD AUTO: 2.97 X10*6/UL (ref 4–5.2)
SODIUM SERPL-SCNC: 139 MMOL/L (ref 136–145)
VANCOMYCIN SERPL-MCNC: 16.7 UG/ML (ref 5–20)
WBC # BLD AUTO: 7 X10*3/UL (ref 4.4–11.3)

## 2025-05-12 PROCEDURE — 82088 ASSAY OF ALDOSTERONE: CPT | Performed by: INTERNAL MEDICINE

## 2025-05-12 PROCEDURE — 2500000001 HC RX 250 WO HCPCS SELF ADMINISTERED DRUGS (ALT 637 FOR MEDICARE OP): Performed by: NURSE PRACTITIONER

## 2025-05-12 PROCEDURE — 84075 ASSAY ALKALINE PHOSPHATASE: CPT | Performed by: INTERNAL MEDICINE

## 2025-05-12 PROCEDURE — 97116 GAIT TRAINING THERAPY: CPT | Mod: CQ,GP

## 2025-05-12 PROCEDURE — 1100000001 HC PRIVATE ROOM DAILY

## 2025-05-12 PROCEDURE — 93975 VASCULAR STUDY: CPT

## 2025-05-12 PROCEDURE — 2500000005 HC RX 250 GENERAL PHARMACY W/O HCPCS: Performed by: INTERNAL MEDICINE

## 2025-05-12 PROCEDURE — 85025 COMPLETE CBC W/AUTO DIFF WBC: CPT | Performed by: INTERNAL MEDICINE

## 2025-05-12 PROCEDURE — 82947 ASSAY GLUCOSE BLOOD QUANT: CPT

## 2025-05-12 PROCEDURE — 2500000001 HC RX 250 WO HCPCS SELF ADMINISTERED DRUGS (ALT 637 FOR MEDICARE OP): Performed by: INTERNAL MEDICINE

## 2025-05-12 PROCEDURE — 2500000002 HC RX 250 W HCPCS SELF ADMINISTERED DRUGS (ALT 637 FOR MEDICARE OP, ALT 636 FOR OP/ED): Performed by: NURSE PRACTITIONER

## 2025-05-12 PROCEDURE — 84244 ASSAY OF RENIN: CPT | Performed by: INTERNAL MEDICINE

## 2025-05-12 PROCEDURE — 84100 ASSAY OF PHOSPHORUS: CPT | Performed by: INTERNAL MEDICINE

## 2025-05-12 PROCEDURE — 83735 ASSAY OF MAGNESIUM: CPT | Performed by: INTERNAL MEDICINE

## 2025-05-12 PROCEDURE — 80202 ASSAY OF VANCOMYCIN: CPT

## 2025-05-12 PROCEDURE — 93975 VASCULAR STUDY: CPT | Performed by: INTERNAL MEDICINE

## 2025-05-12 PROCEDURE — 97535 SELF CARE MNGMENT TRAINING: CPT | Mod: CQ,GP

## 2025-05-12 RX ADMIN — Medication 3 MG: at 20:38

## 2025-05-12 RX ADMIN — ACETAMINOPHEN 650 MG: 325 TABLET, FILM COATED ORAL at 01:00

## 2025-05-12 RX ADMIN — FAMOTIDINE 10 MG: 20 TABLET, FILM COATED ORAL at 10:11

## 2025-05-12 RX ADMIN — CARVEDILOL 12.5 MG: 12.5 TABLET, FILM COATED ORAL at 10:11

## 2025-05-12 RX ADMIN — INSULIN LISPRO 2 UNITS: 100 INJECTION, SOLUTION INTRAVENOUS; SUBCUTANEOUS at 12:43

## 2025-05-12 RX ADMIN — ASPIRIN 81 MG: 81 TABLET, COATED ORAL at 10:11

## 2025-05-12 RX ADMIN — CARVEDILOL 12.5 MG: 12.5 TABLET, FILM COATED ORAL at 20:38

## 2025-05-12 RX ADMIN — ACETAMINOPHEN 650 MG: 325 TABLET, FILM COATED ORAL at 20:38

## 2025-05-12 RX ADMIN — DOCUSATE SODIUM 100 MG: 100 CAPSULE, LIQUID FILLED ORAL at 10:11

## 2025-05-12 ASSESSMENT — COGNITIVE AND FUNCTIONAL STATUS - GENERAL
MOBILITY SCORE: 16
TURNING FROM BACK TO SIDE WHILE IN FLAT BAD: A LITTLE
STANDING UP FROM CHAIR USING ARMS: A LITTLE
CLIMB 3 TO 5 STEPS WITH RAILING: TOTAL
MOVING TO AND FROM BED TO CHAIR: A LITTLE
MOVING FROM LYING ON BACK TO SITTING ON SIDE OF FLAT BED WITH BEDRAILS: A LITTLE
WALKING IN HOSPITAL ROOM: A LITTLE

## 2025-05-12 ASSESSMENT — PAIN - FUNCTIONAL ASSESSMENT
PAIN_FUNCTIONAL_ASSESSMENT: 0-10

## 2025-05-12 ASSESSMENT — PAIN DESCRIPTION - DESCRIPTORS
DESCRIPTORS: ACHING

## 2025-05-12 ASSESSMENT — PAIN SCALES - GENERAL
PAINLEVEL_OUTOF10: 4
PAINLEVEL_OUTOF10: 3
PAINLEVEL_OUTOF10: 0 - NO PAIN

## 2025-05-12 ASSESSMENT — PAIN DESCRIPTION - ORIENTATION
ORIENTATION: LEFT
ORIENTATION: LEFT

## 2025-05-12 ASSESSMENT — PAIN DESCRIPTION - LOCATION
LOCATION: HIP
LOCATION: HIP

## 2025-05-12 NOTE — NURSING NOTE
Patient c/o severe pain to left hip.  Pain unrelated to staple removal, pain described as being in the whole leg.  Patient repositioned and medicated.  Pain uncontrolled w/PRN Tylenol.

## 2025-05-12 NOTE — PROGRESS NOTES
Vancomycin Dosing by Pharmacy- FOLLOW UP    Tenzin Cherry is a 49 y.o. year old female who Pharmacy has been consulted for vancomycin dosing for cellulitis, skin and soft tissue. Based on the patient's indication and renal status this patient is being dosed based on a goal trough/random level of 10-15.     Renal function is currently stable.    Holding vancomycin due to supra-therapeutic levels. Will need to check with ordering provider prior to restarting vancomycin therapy.     Most recent trough level: 16.7 mcg/mL    Visit Vitals  BP (!) 140/92   Pulse 70   Temp 35.8 °C (96.4 °F)   Resp 18        Lab Results   Component Value Date    CREATININE 2.78 (H) 2025    CREATININE 2.70 (H) 2025    CREATININE 2.76 (H) 05/10/2025    CREATININE 2.70 (H) 2025        Patient weight is as follows:   Vitals:    25 1903   Weight: 54.4 kg (120 lb)       Cultures:  No results found for the encounter in last 14 days.       I/O last 3 completed shifts:  In: 120 (2.2 mL/kg) [P.O.:120]  Out: 1700 (31.3 mL/kg) [Urine:1700 (0.9 mL/kg/hr)]  Dosing Weight: 54.4 kg   I/O during current shift:  No intake/output data recorded.    Temp (24hrs), Av.3 °C (97.3 °F), Min:35.8 °C (96.4 °F), Max:36.7 °C (98.1 °F)      Assessment/Plan    Resulting trough level is still slightly above goal random/trough level. As such, will continue to hold. Vancomycin level will likely be therapeutic tomorrow, will need to check with provider prior to restarting vancomycin.  The next level will be obtained on 25 with AM labs. May be obtained sooner if clinically indicated.   Will continue to monitor renal function daily while on vancomycin and order serum creatinine at least every 48 hours if not already ordered.  Follow for continued vancomycin needs, clinical response, and signs/symptoms of toxicity.     Pamela Haas, PharmD, BCPS   2025 7:41 AM

## 2025-05-12 NOTE — PROGRESS NOTES
Physical Therapy    Physical Therapy Treatment    Patient Name: Tenzin Cherry  MRN: [id number]  Department: [facility]  Room: [address] [place] [address]  Today's Date: 5/12/2025  Time Calculation  Start Time: 0931  Stop Time: 1001  Time Calculation (min): 30 min         Assessment/Plan         PT Plan  Treatment/Interventions: Bed mobility, Transfer training, Gait training, Balance training, Therapeutic exercise, Therapeutic activity  PT Plan: Ongoing PT  PT Frequency: 3 times per week  PT Discharge Recommendations: Moderate intensity level of continued care  PT Recommended Transfer Status: Assist x1  PT - OK to Discharge: Yes (when cleared by medical team)      General Visit Information:   PT  Visit  PT Received On: 05/12/25       Subjective                Objective   Pain: no complaints                          Treatments:  Therapeutic Exercise  Therapeutic Exercise Performed:  (ble laqs, marching, ap's,abd/add x 20 reps ea)         Ambulation/Gait Training  Ambulation/Gait Training Performed:  (ambuated 45 feet x 2 using fixed wheeled walker cga to sba x 1   ttwb lle)  Transfers  Transfer:  (sit to stand cga)         Outcome Measures:  [facility] Basic Mobility  Turning from your back to your side while in a flat bed without using bedrails: A little  Moving from lying on your back to sitting on the side of a flat bed without using bedrails: A little  Moving to and from bed to chair (including a wheelchair): A little  Standing up from a chair using your arms (e.g. wheelchair or bedside chair): A little  To walk in hospital room: A little  Climbing 3-5 steps with railing: Total  Basic Mobility - Total Score: 16    Education Documentation  Mobility Training, taught by Phoebe Ramirez PTA at 5/12/2025 11:37 AM.  Learner: Patient  Readiness: Acceptance  Method: Demonstration  Response: Demonstrated Understanding    Education Comments  No comments found.           Encounter Problems       Encounter Problems (Active)       PT Problem        STG - Pt will transition supine <> sitting with supervision maintaining lt thr precautions (Progressing)       Start:  05/08/25    Expected End:  05/22/25            STG - Pt will transfer STS with supervision   (Progressing)       Start:  05/08/25    Expected End:  05/22/25            STG - Pt will amb >=50' using ww maintaining LLE TTWB with sba  (Progressing)       Start:  05/08/25    Expected End:  05/22/25            STG - Pt will perform 2-3 sets of THR therex x10 to maximize functional strength and independence  (Progressing)       Start:  05/08/25    Expected End:  05/22/25               Pain - Adult

## 2025-05-12 NOTE — PROGRESS NOTES
Subjective   No Complaints  Vitals Reviewed  Objective          Vitals 24HR  Heart Rate:  [70-88]   Temp:  [35.8 °C (96.4 °F)-36.7 °C (98.1 °F)]   Resp:  [18]   BP: (128-175)/(82-96)   SpO2:  [97 %-99 %]         Intake/Output last 3 Shifts:    Intake/Output Summary (Last 24 hours) at 5/12/2025 1000  Last data filed at 5/11/2025 2115  Gross per 24 hour   Intake --   Output 900 ml   Net -900 ml     36.1 ...    36.9 ...   36.5 ...  36.2 ...  36.5 ... 36.7 ...   36.3 ... 35.8 ...  Temp        Temp Source  Tempo...            Tempo...    Tempo...  Temp Source     Heart Rate  80    87   74  84  88 81   71 70  Heart Rate     Resp rate  18    18   18     18    18  Resp rate     BP (cuff)  166/95    169/98   153/95  159/99  175/96 146/95   128/82 140/92  BP (cuff)     MAP (cuff)  125    124   119  124  127 118   100 111  MAP (cuff)            Physical Exam  General appearance; alert oriented  HEENT; there is no icterus pupils react to light accommodation  Neck; no JVD no bruit no thyromegaly no adenopathy  Lungs; clear to auscultation and percussion  Heart; regular rate no gallop no rubs no thrills   Abdomen ;active peristalsis, no guarding no rubs  Extremities; no edema  Neurological; no clonus no tremors no asterixis  Relevant Results  Results for orders placed or performed during the hospital encounter of 05/07/25 (from the past 24 hours)   POCT GLUCOSE   Result Value Ref Range    POCT Glucose 129 (H) 74 - 99 mg/dL   POCT GLUCOSE   Result Value Ref Range    POCT Glucose 181 (H) 74 - 99 mg/dL   POCT GLUCOSE   Result Value Ref Range    POCT Glucose 151 (H) 74 - 99 mg/dL   CBC and Auto Differential   Result Value Ref Range    WBC 7.0 4.4 - 11.3 x10*3/uL    nRBC 0.0 0.0 - 0.0 /100 WBCs    RBC 2.97 (L) 4.00 - 5.20 x10*6/uL    Hemoglobin 9.0 (L) 12.0 - 16.0 g/dL    Hematocrit 27.5 (L) 36.0 - 46.0 %    MCV 93 80 - 100 fL    MCH 30.3 26.0 - 34.0 pg    MCHC 32.7 32.0 - 36.0 g/dL    RDW 16.4 (H) 11.5 - 14.5 %    Platelets 339  150 - 450 x10*3/uL    Neutrophils % 69.8 40.0 - 80.0 %    Immature Granulocytes %, Automated 0.6 0.0 - 0.9 %    Lymphocytes % 14.3 13.0 - 44.0 %    Monocytes % 11.7 2.0 - 10.0 %    Eosinophils % 2.7 0.0 - 6.0 %    Basophils % 0.9 0.0 - 2.0 %    Neutrophils Absolute 4.88 1.20 - 7.70 x10*3/uL    Immature Granulocytes Absolute, Automated 0.04 0.00 - 0.70 x10*3/uL    Lymphocytes Absolute 1.00 (L) 1.20 - 4.80 x10*3/uL    Monocytes Absolute 0.82 0.10 - 1.00 x10*3/uL    Eosinophils Absolute 0.19 0.00 - 0.70 x10*3/uL    Basophils Absolute 0.06 0.00 - 0.10 x10*3/uL   Comprehensive Metabolic Panel   Result Value Ref Range    Glucose 155 (H) 74 - 99 mg/dL    Sodium 139 136 - 145 mmol/L    Potassium 3.7 3.5 - 5.3 mmol/L    Chloride 103 98 - 107 mmol/L    Bicarbonate 25 21 - 32 mmol/L    Anion Gap 15 10 - 20 mmol/L    Urea Nitrogen 16 6 - 23 mg/dL    Creatinine 2.78 (H) 0.50 - 1.05 mg/dL    eGFR 21 (L) >60 mL/min/1.73m*2    Calcium 9.2 8.6 - 10.3 mg/dL    Albumin 3.2 (L) 3.4 - 5.0 g/dL    Alkaline Phosphatase 161 (H) 33 - 110 U/L    Total Protein 6.0 (L) 6.4 - 8.2 g/dL    AST 16 9 - 39 U/L    Bilirubin, Total 0.3 0.0 - 1.2 mg/dL    ALT 24 7 - 45 U/L   Magnesium   Result Value Ref Range    Magnesium 1.85 1.60 - 2.40 mg/dL   Vancomycin   Result Value Ref Range    Vancomycin 16.7 5.0 - 20.0 ug/mL   Phosphorus   Result Value Ref Range    Phosphorus 4.5 2.5 - 4.9 mg/dL   POCT GLUCOSE   Result Value Ref Range    POCT Glucose 129 (H) 74 - 99 mg/dL                       Assessment & Plan  ALEIDA (acute kidney injury)  History of recent rhabdomyolysis  Anemia of chronic disease  History of idiopathic thrombocytopenic purpura  Hypertension  Overt proteinuria  Hypokalemia    Anemia  Plan  Awaiting Renal Artery Duplex  Continue to adjust BP Mweds        I spent 20 minutes in the professional and overall care of this patient.      Arvin Sharma MD

## 2025-05-12 NOTE — CARE PLAN
The patient's goals for the shift include  to work with therapy    The clinical goals for the shift include remain HD stable

## 2025-05-12 NOTE — CARE PLAN
The patient's goals for the shift include  none stated    The clinical goals for the shift include free from falls      Problem: Skin  Goal: Participates in plan/prevention/treatment measures  Flowsheets (Taken 5/11/2025 2245)  Participates in plan/prevention/treatment measures: Elevate heels  Goal: Prevent/manage excess moisture  Flowsheets (Taken 5/11/2025 2245)  Prevent/manage excess moisture:   Cleanse incontinence/protect with barrier cream   Moisturize dry skin  Goal: Prevent/minimize sheer/friction injuries  Flowsheets (Taken 5/11/2025 2245)  Prevent/minimize sheer/friction injuries: Use pull sheet  Goal: Promote/optimize nutrition  Flowsheets (Taken 5/11/2025 2245)  Promote/optimize nutrition: Offer water/supplements/favorite foods  Goal: Promote skin healing  Flowsheets (Taken 5/11/2025 2245)  Promote skin healing:   Assess skin/pad under line(s)/device(s)   Protective dressings over bony prominences

## 2025-05-12 NOTE — PROGRESS NOTES
Requested referral be placed in care port for [facility] via [facility].       5/12 315pm  Requested direct precert team start precert for this patient going to [facility].

## 2025-05-13 ENCOUNTER — APPOINTMENT (OUTPATIENT)
Dept: CARDIOLOGY | Facility: HOSPITAL | Age: 50
End: 2025-05-13
Payer: MEDICARE

## 2025-05-13 LAB
ALBUMIN SERPL BCP-MCNC: 3.3 G/DL (ref 3.4–5)
ALDOSTERONE IN SERUM: <3 NG/DL
ALP SERPL-CCNC: 175 U/L (ref 33–110)
ALT SERPL W P-5'-P-CCNC: 21 U/L (ref 7–45)
ANION GAP SERPL CALC-SCNC: 13 MMOL/L (ref 10–20)
AST SERPL W P-5'-P-CCNC: 15 U/L (ref 9–39)
BASOPHILS # BLD AUTO: 0.06 X10*3/UL (ref 0–0.1)
BASOPHILS NFR BLD AUTO: 0.7 %
BILIRUB SERPL-MCNC: 0.4 MG/DL (ref 0–1.2)
BUN SERPL-MCNC: 20 MG/DL (ref 6–23)
CALCIUM SERPL-MCNC: 9.3 MG/DL (ref 8.6–10.3)
CHLORIDE SERPL-SCNC: 104 MMOL/L (ref 98–107)
CO2 SERPL-SCNC: 28 MMOL/L (ref 21–32)
CREAT SERPL-MCNC: 2.89 MG/DL (ref 0.5–1.05)
EGFRCR SERPLBLD CKD-EPI 2021: 20 ML/MIN/1.73M*2
EOSINOPHIL # BLD AUTO: 0.16 X10*3/UL (ref 0–0.7)
EOSINOPHIL NFR BLD AUTO: 1.9 %
ERYTHROCYTE [DISTWIDTH] IN BLOOD BY AUTOMATED COUNT: 16.4 % (ref 11.5–14.5)
GLUCOSE BLD MANUAL STRIP-MCNC: 113 MG/DL (ref 74–99)
GLUCOSE BLD MANUAL STRIP-MCNC: 119 MG/DL (ref 74–99)
GLUCOSE BLD MANUAL STRIP-MCNC: 138 MG/DL (ref 74–99)
GLUCOSE BLD MANUAL STRIP-MCNC: 218 MG/DL (ref 74–99)
GLUCOSE SERPL-MCNC: 116 MG/DL (ref 74–99)
HCT VFR BLD AUTO: 28.7 % (ref 36–46)
HGB BLD-MCNC: 9.4 G/DL (ref 12–16)
IMM GRANULOCYTES # BLD AUTO: 0.05 X10*3/UL (ref 0–0.7)
IMM GRANULOCYTES NFR BLD AUTO: 0.6 % (ref 0–0.9)
LYMPHOCYTES # BLD AUTO: 0.97 X10*3/UL (ref 1.2–4.8)
LYMPHOCYTES NFR BLD AUTO: 11.4 %
MCH RBC QN AUTO: 30.3 PG (ref 26–34)
MCHC RBC AUTO-ENTMCNC: 32.8 G/DL (ref 32–36)
MCV RBC AUTO: 93 FL (ref 80–100)
MONOCYTES # BLD AUTO: 1.02 X10*3/UL (ref 0.1–1)
MONOCYTES NFR BLD AUTO: 12 %
NEUTROPHILS # BLD AUTO: 6.27 X10*3/UL (ref 1.2–7.7)
NEUTROPHILS NFR BLD AUTO: 73.4 %
NRBC BLD-RTO: 0 /100 WBCS (ref 0–0)
PLATELET # BLD AUTO: 382 X10*3/UL (ref 150–450)
POTASSIUM SERPL-SCNC: 3.8 MMOL/L (ref 3.5–5.3)
PROT SERPL-MCNC: 5.9 G/DL (ref 6.4–8.2)
RBC # BLD AUTO: 3.1 X10*6/UL (ref 4–5.2)
SODIUM SERPL-SCNC: 141 MMOL/L (ref 136–145)
VANCOMYCIN SERPL-MCNC: 15.1 UG/ML (ref 5–20)
WBC # BLD AUTO: 8.5 X10*3/UL (ref 4.4–11.3)

## 2025-05-13 PROCEDURE — 1100000001 HC PRIVATE ROOM DAILY

## 2025-05-13 PROCEDURE — 2500000004 HC RX 250 GENERAL PHARMACY W/ HCPCS (ALT 636 FOR OP/ED): Mod: JZ | Performed by: INTERNAL MEDICINE

## 2025-05-13 PROCEDURE — 2500000005 HC RX 250 GENERAL PHARMACY W/O HCPCS: Performed by: INTERNAL MEDICINE

## 2025-05-13 PROCEDURE — 85025 COMPLETE CBC W/AUTO DIFF WBC: CPT | Performed by: INTERNAL MEDICINE

## 2025-05-13 PROCEDURE — 97535 SELF CARE MNGMENT TRAINING: CPT | Mod: GO

## 2025-05-13 PROCEDURE — 2500000001 HC RX 250 WO HCPCS SELF ADMINISTERED DRUGS (ALT 637 FOR MEDICARE OP): Performed by: INTERNAL MEDICINE

## 2025-05-13 PROCEDURE — 80202 ASSAY OF VANCOMYCIN: CPT

## 2025-05-13 PROCEDURE — 82947 ASSAY GLUCOSE BLOOD QUANT: CPT

## 2025-05-13 PROCEDURE — 2500000002 HC RX 250 W HCPCS SELF ADMINISTERED DRUGS (ALT 637 FOR MEDICARE OP, ALT 636 FOR OP/ED): Performed by: NURSE PRACTITIONER

## 2025-05-13 PROCEDURE — 93005 ELECTROCARDIOGRAM TRACING: CPT

## 2025-05-13 PROCEDURE — 84075 ASSAY ALKALINE PHOSPHATASE: CPT | Performed by: INTERNAL MEDICINE

## 2025-05-13 PROCEDURE — 2500000004 HC RX 250 GENERAL PHARMACY W/ HCPCS (ALT 636 FOR OP/ED): Mod: JZ | Performed by: PHYSICIAN ASSISTANT

## 2025-05-13 RX ORDER — SODIUM CHLORIDE 9 MG/ML
75 INJECTION, SOLUTION INTRAVENOUS CONTINUOUS
Status: ACTIVE | OUTPATIENT
Start: 2025-05-13 | End: 2025-05-15

## 2025-05-13 RX ORDER — ONDANSETRON HYDROCHLORIDE 2 MG/ML
4 INJECTION, SOLUTION INTRAVENOUS EVERY 6 HOURS PRN
Status: DISCONTINUED | OUTPATIENT
Start: 2025-05-13 | End: 2025-05-22 | Stop reason: HOSPADM

## 2025-05-13 RX ORDER — ALBUTEROL SULFATE 90 UG/1
2 INHALANT RESPIRATORY (INHALATION) EVERY 2 HOUR PRN
Status: DISCONTINUED | OUTPATIENT
Start: 2025-05-13 | End: 2025-05-22 | Stop reason: HOSPADM

## 2025-05-13 RX ADMIN — CARVEDILOL 12.5 MG: 12.5 TABLET, FILM COATED ORAL at 20:15

## 2025-05-13 RX ADMIN — SODIUM CHLORIDE 75 ML/HR: 0.9 INJECTION, SOLUTION INTRAVENOUS at 09:18

## 2025-05-13 RX ADMIN — CARVEDILOL 12.5 MG: 12.5 TABLET, FILM COATED ORAL at 08:10

## 2025-05-13 RX ADMIN — FAMOTIDINE 10 MG: 20 TABLET, FILM COATED ORAL at 08:10

## 2025-05-13 RX ADMIN — Medication 3 MG: at 20:16

## 2025-05-13 RX ADMIN — ASPIRIN 81 MG: 81 TABLET, COATED ORAL at 08:10

## 2025-05-13 RX ADMIN — ONDANSETRON 4 MG: 2 INJECTION INTRAMUSCULAR; INTRAVENOUS at 17:11

## 2025-05-13 RX ADMIN — INSULIN LISPRO 4 UNITS: 100 INJECTION, SOLUTION INTRAVENOUS; SUBCUTANEOUS at 11:50

## 2025-05-13 RX ADMIN — CALCIUM CARBONATE (ANTACID) CHEW TAB 500 MG 500 MG: 500 CHEW TAB at 20:17

## 2025-05-13 RX ADMIN — SODIUM CHLORIDE 75 ML/HR: 0.9 INJECTION, SOLUTION INTRAVENOUS at 22:59

## 2025-05-13 RX ADMIN — DOCUSATE SODIUM 100 MG: 100 CAPSULE, LIQUID FILLED ORAL at 08:10

## 2025-05-13 ASSESSMENT — COGNITIVE AND FUNCTIONAL STATUS - GENERAL
HELP NEEDED FOR BATHING: A LOT
STANDING UP FROM CHAIR USING ARMS: A LITTLE
PERSONAL GROOMING: A LITTLE
DAILY ACTIVITIY SCORE: 16
EATING MEALS: A LITTLE
EATING MEALS: A LITTLE
WALKING IN HOSPITAL ROOM: A LITTLE
MOBILITY SCORE: 18
DRESSING REGULAR UPPER BODY CLOTHING: A LITTLE
DRESSING REGULAR LOWER BODY CLOTHING: A LITTLE
TOILETING: A LOT
HELP NEEDED FOR BATHING: A LITTLE
CLIMB 3 TO 5 STEPS WITH RAILING: A LITTLE
DRESSING REGULAR UPPER BODY CLOTHING: A LITTLE
MOVING FROM LYING ON BACK TO SITTING ON SIDE OF FLAT BED WITH BEDRAILS: A LITTLE
MOVING TO AND FROM BED TO CHAIR: A LITTLE
TURNING FROM BACK TO SIDE WHILE IN FLAT BAD: A LITTLE
DRESSING REGULAR LOWER BODY CLOTHING: A LITTLE
PERSONAL GROOMING: A LITTLE

## 2025-05-13 ASSESSMENT — ACTIVITIES OF DAILY LIVING (ADL): HOME_MANAGEMENT_TIME_ENTRY: 24

## 2025-05-13 ASSESSMENT — PAIN SCALES - GENERAL
PAINLEVEL_OUTOF10: 3
PAINLEVEL_OUTOF10: 0 - NO PAIN
PAINLEVEL_OUTOF10: 0 - NO PAIN

## 2025-05-13 ASSESSMENT — PAIN - FUNCTIONAL ASSESSMENT
PAIN_FUNCTIONAL_ASSESSMENT: 0-10

## 2025-05-13 NOTE — PROGRESS NOTES
Subjective   No Complaints  Blood pressure stable   Duplex sonography of the renal arteries was unremarkable  Patient has mild proteinuria and positive eosinophils in the urine and acute kidney injury most likely secondary to interstitial nephropathy secondary to IV vancomycin and daptomycin exposure patient renal chemistries are stable.  Hopefully will observe improvement within the next few weeks  Objective          Vitals 24HR  Heart Rate:  [67-74]   Temp:  [36.1 °C (97 °F)-36.7 °C (98.1 °F)]   Resp:  [13-18]   BP: (130-163)/(81-97)   SpO2:  [97 %-98 %]         Intake/Output last 3 Shifts:    Intake/Output Summary (Last 24 hours) at 5/13/2025 1311  Last data filed at 5/13/2025 1131  Gross per 24 hour   Intake 166.25 ml   Output 350 ml   Net -183.75 ml                                                                                                                                                                   Physical Exam  General appearance; alert oriented  HEENT; there is no icterus pupils react to light accommodation  Neck; no JVD no bruit no thyromegaly no adenopathy  Lungs; clear to auscultation and percussion  Heart; regular rate no gallop no rubs no thrills   Abdomen ;active peristalsis, no guarding no rubs  Extremities; no edema  Neurological; no clonus no tremors no asterixis  Relevant Results  Results for orders placed or performed during the hospital encounter of 05/07/25 (from the past 24 hours)   POCT GLUCOSE   Result Value Ref Range    POCT Glucose 130 (H) 74 - 99 mg/dL   POCT GLUCOSE   Result Value Ref Range    POCT Glucose 176 (H) 74 - 99 mg/dL   POCT GLUCOSE   Result Value Ref Range    POCT Glucose 119 (H) 74 - 99 mg/dL   CBC and Auto Differential   Result Value Ref Range    WBC 8.5 4.4 - 11.3 x10*3/uL    nRBC 0.0 0.0 - 0.0 /100 WBCs    RBC 3.10 (L) 4.00 - 5.20 x10*6/uL    Hemoglobin 9.4 (L) 12.0 - 16.0 g/dL    Hematocrit 28.7 (L) 36.0 - 46.0 %    MCV 93 80 - 100 fL    MCH 30.3 26.0 -  34.0 pg    MCHC 32.8 32.0 - 36.0 g/dL    RDW 16.4 (H) 11.5 - 14.5 %    Platelets 382 150 - 450 x10*3/uL    Neutrophils % 73.4 40.0 - 80.0 %    Immature Granulocytes %, Automated 0.6 0.0 - 0.9 %    Lymphocytes % 11.4 13.0 - 44.0 %    Monocytes % 12.0 2.0 - 10.0 %    Eosinophils % 1.9 0.0 - 6.0 %    Basophils % 0.7 0.0 - 2.0 %    Neutrophils Absolute 6.27 1.20 - 7.70 x10*3/uL    Immature Granulocytes Absolute, Automated 0.05 0.00 - 0.70 x10*3/uL    Lymphocytes Absolute 0.97 (L) 1.20 - 4.80 x10*3/uL    Monocytes Absolute 1.02 (H) 0.10 - 1.00 x10*3/uL    Eosinophils Absolute 0.16 0.00 - 0.70 x10*3/uL    Basophils Absolute 0.06 0.00 - 0.10 x10*3/uL   Comprehensive Metabolic Panel   Result Value Ref Range    Glucose 116 (H) 74 - 99 mg/dL    Sodium 141 136 - 145 mmol/L    Potassium 3.8 3.5 - 5.3 mmol/L    Chloride 104 98 - 107 mmol/L    Bicarbonate 28 21 - 32 mmol/L    Anion Gap 13 10 - 20 mmol/L    Urea Nitrogen 20 6 - 23 mg/dL    Creatinine 2.89 (H) 0.50 - 1.05 mg/dL    eGFR 20 (L) >60 mL/min/1.73m*2    Calcium 9.3 8.6 - 10.3 mg/dL    Albumin 3.3 (L) 3.4 - 5.0 g/dL    Alkaline Phosphatase 175 (H) 33 - 110 U/L    Total Protein 5.9 (L) 6.4 - 8.2 g/dL    AST 15 9 - 39 U/L    Bilirubin, Total 0.4 0.0 - 1.2 mg/dL    ALT 21 7 - 45 U/L   Vancomycin   Result Value Ref Range    Vancomycin 15.1 5.0 - 20.0 ug/mL   POCT GLUCOSE   Result Value Ref Range    POCT Glucose 218 (H) 74 - 99 mg/dL                       Assessment & Plan  ALEIDA (acute kidney injury)  Acute interstitial nephritis  History of recent rhabdomyolysis  Anemia of chronic disease  History of idiopathic thrombocytopenic purpura  Hypertension  Overt proteinuria  Hypokalemia    Anemia  Plan  Continue current meds   monitor renal chemistries        I spent 20 minutes in the professional and overall care of this patient.      Arvin Sharma MD

## 2025-05-13 NOTE — CARE PLAN
The patient's goals for the shift include  return home and heal    The clinical goals for the shift include Patient will remain free from injury

## 2025-05-13 NOTE — PROGRESS NOTES
Occupational Therapy    OT Treatment    Patient Name: Tenzin Cherry  MRN: [id number]  Department: [facility]  Room: [facility]  Today's Date: 5/13/2025  Time Calculation  Start Time: 1338  Stop Time: 1402  Time Calculation (min): 24 min        Assessment:  OT Assessment: Session focused on increasing IND with LB ADLs, toileting, transfers and functional mobility while maintaining precautions. Pt requires consistent vc throughout tasks to maintain precautions.  Evaluation/Treatment Tolerance: Patient tolerated treatment well  Medical Staff Made Aware: Yes  End of Session Communication: Bedside nurse  End of Session Patient Position: Up in chair (alarm not working, RN notified)  Evaluation/Treatment Tolerance: Patient tolerated treatment well  Medical Staff Made Aware: Yes  Plan:  Treatment Interventions: ADL retraining, Functional transfer training, Neuromuscular reeducation, Compensatory technique education  OT Frequency: 3 times per week  OT Discharge Recommendations: Moderate intensity level of continued care  OT - OK to Discharge: Yes (from an O.T. standpoint)  Treatment Interventions: ADL retraining, Functional transfer training, Neuromuscular reeducation, Compensatory technique education    Subjective   Previous Visit Info:  OT Last Visit  OT Received On: 05/13/25  General:  General  Past Medical History Relevant to Rehab: 5/7/25: abnormal labs.  PMH: L THR, wound infection, had wound vac, MRDD, DM II, HLD, HTN, uterine CA, partial hysterectomy, laryngeal mass, tracheostomy with closure  Prior to Session Communication: Bedside nurse, PCT/NA/CTA  Patient Position Received: Bed, 2 rail up, Alarm off, not on at start of session (IV)  General Comment: Pt pleasant and agreeable to participate in therapy. Stating she needs to use the commode.  Precautions:  LE Weight Bearing Status: Left Toe-Touch Weight Bearing  Medical Precautions: Fall precautions  Precautions Comment: L THR precautions, TTWB LLE,  fall/safety            Pain:  Pain Assessment  Pain Assessment: 0-10  0-10 (Numeric) Pain Score: 0 - No pain    Objective    Cognition:  Cognition  Overall Cognitive Status: Within Functional Limits  Processing Speed: Delayed     Activities of Daily Living: LE Dressing  LE Dressing: Yes  LE Dressing Comments: Pt required MIN A to thread LLE in pants using reacher, able to thread RLE, and pull over hips in supported stance at FWW with increased time.    Toileting  Toileting Comments: Pt required MOD A to complete clothing management and hygiene in stance at FWW.    Bed Mobility/Transfers: Bed Mobility  Bed Mobility: Yes  Bed Mobility 1  Bed Mobility Comments 1: Supine to sit EOB with SBA, use of bed rail and HOB elevated.    Transfers  Transfer:  (x2 STS using FWW with CGA. Cues to maintain TTWB LLE.)    Toilet Transfers  Toilet Transfers Comments: SPT from bed <> BSC using FWW with CGA, cues to maintain TTWB LLE.    Functional Mobility:  Functional Mobility  Functional Mobility Performed: Yes (Pt completed functional mobility max household distances using FWW with CGA and OT assisting with IV pole, max cues to maintain TTWB LLE and offload LLE by pushing through FWW, questionable ability to maintain throughout.)  Standing Balance:  Dynamic Standing Balance  Dynamic Standing-Balance Support: Bilateral upper extremity supported  Dynamic Standing-Level of Assistance: Contact guard    Outcome Measures:[facility] Daily Activity  Putting on and taking off regular lower body clothing: A little  Bathing (including washing, rinsing, drying): A lot  Putting on and taking off regular upper body clothing: A little  Toileting, which includes using toilet, bedpan or urinal: A lot  Taking care of personal grooming such as brushing teeth: A little  Eating Meals: A little  Daily Activity - Total Score: 16        Education Documentation  Precautions, taught by Pamela Israel OT at 5/13/2025  3:10 PM.  Learner: Patient  Readiness:  Acceptance  Method: Explanation, Demonstration  Response: Verbalizes Understanding, Demonstrated Understanding, Needs Reinforcement    ADL Training, taught by Pamela Israel OT at 5/13/2025  3:10 PM.  Learner: Patient  Readiness: Acceptance  Method: Explanation, Demonstration  Response: Verbalizes Understanding, Demonstrated Understanding, Needs Reinforcement    IP EDUCATION:  Education  Individual(s) Educated: Patient  Education Comment: Pt educated on compensatory LB ADL techniques while maintaining hip precautions, techniques during functional transfers/mobility.    Goals:  Encounter Problems       Encounter Problems (Active)       OT Goals       Increase LE dressing & toileting to SBA with adaptive equipment as needed.  (Progressing)       Start:  05/08/25    Expected End:  05/22/25            Increase functional transfers & mobility to/from bed, chair & commode to SBA with DME for safety  (Progressing)       Start:  05/08/25    Expected End:  05/22/25            Demonstrate compliance to post op precautions and safety techs with min cues during ADLs  (Progressing)       Start:  05/08/25    Expected End:  05/22/25

## 2025-05-13 NOTE — PROGRESS NOTES
Tenzin Matias is a 49 y.o. female on day 5 of admission presenting with ALEIDA (acute kidney injury).      Subjective   No events overnight. Patient seen and examined at bedside. She has no complaints.        Objective     Last Recorded Vitals  BP (!) 163/97   Pulse 74   Temp 36.7 °C (98.1 °F)   Resp 18   Wt 54.4 kg (120 lb)   SpO2 97%   Intake/Output last 3 Shifts:    Intake/Output Summary (Last 24 hours) at 5/12/2025 2233  Last data filed at 5/12/2025 1207  Gross per 24 hour   Intake 238 ml   Output 350 ml   Net -112 ml       Admission Weight  Weight: 54.4 kg (120 lb) (05/07/25 1903)    Daily Weight  05/07/25 : 54.4 kg (120 lb)    Image Results  Renal artery duplex complete             [place] [address]  Tel [phone] and Fax [phone]       Vascular Lab Report  [facility] US RENAL ARTERY DUPLEX COMPLETE       Patient Name:      TENZIN BECKMAN        Reading Physician:  [id number] Patti MATIAS MD  Study Date:        5/12/2025             Ordering Physician: [id number] ENRIQUE TORRES  MRN/PID:           [id number]              Technologist:       Angelina Holm RVT  Accession#:        [id number]          Technologist 2:  Date of Birth/Age: 1975 / 49 years  Encounter#:         [id number]  Gender:            F  Admission Status:  Inpatient             Location Performed: [facility]       Diagnosis/ICD: Essential primary hypertension-I10  Indication:    Benign renovascular hypertension  CPT Codes:     77711 Abdominal Visceral Renal       CONCLUSIONS:  Right Renal Artery: Right renal arteries demonstrate no evidence of hemodynamically significant stenosis. The right renal vein is widely patent. Peak systolic velocities appear decreased within the renal arteries  bilaterally.  Left Renal Artery: Left renal arteries demonstrate no evidence of hemodynamically significant stenosis. The left renal vein is widely patent. Peak systolic velocities appear decreased within the renal arteries bilaterally.     Additional Findings:  Technically difficult secondary to shadowing from bowel gas.       Imaging & Doppler Findings:     AORTA    PSV   Mid  110.3 cm/s       Renal Artery Duplex Right Kidney: 9.8 cm                           Left Kidney: 9.6 cm        Systolic       Diastolic     ARTERY           Systolic       Diastolic        61 cm/s         27 cm/s      Origin            43 cm/s        15 cm/s        57 cm/s         20 cm/s       Prox             41 cm/s        17 cm/s        43 cm/s         17 cm/s        Mid             27 cm/s        12 cm/s        55 cm/s         23 cm/s      Distal            26 cm/s        10 cm/s        19 cm/s         8 cm/s      Superior           18 cm/s        8 cm/s        24 cm/s         8 cm/s      Inferior           23 cm/s        7 cm/s                         Right                          Left                          0.6       R/A Ratio            0.4                          0.7    Resistive Index         0.6       Ao Dist Diam 1.31 cm  Mid Ao       110 cm/s          [id number] Patti Pacheco MD  Electronically signed by [id number] Patti Pacheco MD on 5/12/2025 at 1:53:18 PM       ** Final **      Physical Exam  Constitutional:       Appearance: Normal appearance.   HENT:      Mouth/Throat:      Mouth: Mucous membranes are dry.      Pharynx: Oropharynx is clear.   Eyes:      Pupils: Pupils are equal, round, and reactive to light.   Cardiovascular:      Rate and Rhythm: Normal rate and regular rhythm.   Pulmonary:      Effort: Pulmonary effort is normal.      Breath sounds: Rhonchi present.   Abdominal:      General: Bowel sounds are normal.      Palpations: Abdomen is soft.   Musculoskeletal:      Cervical back: Normal range of motion.      Left hip:  Tenderness present.   Skin:     General: Skin is warm and dry.      Capillary Refill: Capillary refill takes less than 2 seconds.   Neurological:      Mental Status: She is alert and oriented to person, place, and time. Mental status is at baseline.   Psychiatric:         Mood and Affect: Mood normal.      Relevant Results                    This patient has a central line   Reason for the central line remaining today? Parenteral nutrition            Assessment & Plan  ALEIDA (acute kidney injury)    Hypokalemia    Anemia    Yesimar is a 49-year-old female with past medical history of MRDD, recent left hip replacement with postop infection currently receiving IV antibiotics at her skilled nursing facility presenting to emergency department for an elevated vancomycin trough and abnormal labs.  Patient was signed out for evaluation of an abnormal creatinine and elevated vancomycin trough.  Patient denies any pain or distress.  Patient is alert and oriented x 3.  Patient was found to have a glucose of 128, potassium 3.1, creatinine 3.24, GFR 17, hemoglobin 9.6, hematocrit 28.8.  Patient is currently receiving vancomycin 1 g every 24 hours and Zosyn 3.375 g every 6 hours for a postop left hip infection.  Patient received 1 L of normal saline in ED, medicated with Tylenol p.o. and KCl 40 Meq p.o.  Admitted for further medical management.     ALEIDA/hypokalemia/anemia  Inpatient admission to Dr. Toni Locke  NS x 1 L in ED/continue LR at 75 an hour  Hold Vancomycin at this time   K+ 3.1  KCl 40 mEq p.o.  Repeat labs in a.m.  Hgb 9.6  Monitor for bleeding  Trend hemoglobin  History of cancer  Continue renal diet with thin liquids     HTN/HLD/DM2/history of uterine cancer and laryngeal mass/MRDD/left hip replacement s/p postop wound infection  #Chronic conditions  Labs in a.m.  Vancomycin trough in a.m.  Tylenol as needed  Nursing to complete home med rec  Hold home metformin  Insulin sliding scale  Hypoglycemia  protocol  PT/OT     DVT Ppx  SCDs  No indication for chemical prophylaxis  Out of bed with assistance     5/9: Creatinine improving. Continue IVF. Repeat labs tomorrow.    5/12: Creatinine has plateaued at 2.7. Renal artery ultrasound pending. Management per Nephrology. Discharge in 24-48 hours, suspect Vanc nephropathy that could take weeks to resolve.             Toni Locke, DO

## 2025-05-13 NOTE — CARE PLAN
The patient's goals for the shift include      The clinical goals for the shift include pt will be safe and comftorable      Problem: Pain - Adult  Goal: Verbalizes/displays adequate comfort level or baseline comfort level  Outcome: Progressing     Problem: Safety - Adult  Goal: Free from fall injury  Outcome: Progressing     Problem: Discharge Planning  Goal: Discharge to home or other facility with appropriate resources  Outcome: Progressing     Problem: Chronic Conditions and Co-morbidities  Goal: Patient's chronic conditions and co-morbidity symptoms are monitored and maintained or improved  Outcome: Progressing     Problem: Nutrition  Goal: Nutrient intake appropriate for maintaining nutritional needs  Outcome: Progressing     Problem: Fall/Injury  Goal: Not fall by end of shift  Outcome: Progressing  Goal: Be free from injury by end of the shift  Outcome: Progressing  Goal: Verbalize understanding of personal risk factors for fall in the hospital  Outcome: Progressing  Goal: Verbalize understanding of risk factor reduction measures to prevent injury from fall in the home  Outcome: Progressing  Goal: Use assistive devices by end of the shift  Outcome: Progressing  Goal: Pace activities to prevent fatigue by end of the shift  Outcome: Progressing     Problem: PICC line  Goal: I will remain free from symptoms of infection  Outcome: Progressing     Problem: Skin  Goal: Participates in plan/prevention/treatment measures  Outcome: Progressing  Goal: Prevent/manage excess moisture  Outcome: Progressing  Goal: Prevent/minimize sheer/friction injuries  Outcome: Progressing  Goal: Promote/optimize nutrition  Outcome: Progressing  Goal: Promote skin healing  Outcome: Progressing

## 2025-05-13 NOTE — PROGRESS NOTES
Vancomycin Dosing by Pharmacy- FOLLOW UP    Tenzin Cherry is a 49 y.o. year old female who Pharmacy has been consulted for vancomycin dosing for cellulitis, skin and soft tissue. Based on the patient's indication and renal status this patient is being dosed based on a goal trough/random level of 10-15.     Renal function is currently declining.    Holding vancomycin due to supra-therapeutic levels. Will need to check with ordering provider prior to restarting vancomycin therapy.     Most recent trough level: 15.1 mcg/mL    Visit Vitals  /86 (BP Location: Left arm, Patient Position: Lying)   Pulse 68   Temp 36.3 °C (97.3 °F) (Temporal)   Resp 16        Lab Results   Component Value Date    CREATININE 2.89 (H) 2025    CREATININE 2.78 (H) 2025    CREATININE 2.70 (H) 2025    CREATININE 2.76 (H) 05/10/2025        Patient weight is as follows:   Vitals:    25 1903   Weight: 54.4 kg (120 lb)       Cultures:  No results found for the encounter in last 14 days.       I/O last 3 completed shifts:  In: 238 (4.4 mL/kg) [P.O.:238]  Out: 1600 (29.4 mL/kg) [Urine:1600 (0.8 mL/kg/hr)]  Dosing Weight: 54.4 kg   I/O during current shift:  I/O this shift:  In: 166.3 [I.V.:166.3]  Out: -     Temp (24hrs), Av.4 °C (97.6 °F), Min:36.1 °C (97 °F), Max:36.7 °C (98.1 °F)      Assessment/Plan    Resulting trough level of 15.1mcg/mL is still slightly above goal random/trough level of 10-15mcg/mL. As such, will continue to hold vancomycin- Scr increased a little bit today. Vancomycin level will likely be therapeutic tomorrow- Scr seemed to plateau around 2.7mg/dL, however, was  will need to check with provider prior to restarting vancomycin. Her original order for vancomycin was to be given through 25  The next level will be obtained on 25 with AM labs. May be obtained sooner if clinically indicated.   Will continue to monitor renal function daily while on vancomycin and order serum  creatinine at least every 48 hours if not already ordered.  Follow for continued vancomycin needs, clinical response, and signs/symptoms of toxicity.       Pamela Haas, PharmD, BCPS   5/13/2025 12:31 PM

## 2025-05-13 NOTE — PROGRESS NOTES
Tenzin Matias is a 49 y.o. female on day 6 of admission presenting with ALEIDA (acute kidney injury).      Subjective   No events overnight. Patient seen and examined at bedside. She has no complaints.        Objective     Last Recorded Vitals  /86 (BP Location: Left arm, Patient Position: Lying)   Pulse 68   Temp 36.3 °C (97.3 °F) (Temporal)   Resp 16   Wt 54.4 kg (120 lb)   SpO2 98%   Intake/Output last 3 Shifts:    Intake/Output Summary (Last 24 hours) at 5/13/2025 1307  Last data filed at 5/13/2025 1131  Gross per 24 hour   Intake 166.25 ml   Output 350 ml   Net -183.75 ml       Admission Weight  Weight: 54.4 kg (120 lb) (05/07/25 1903)    Daily Weight  05/07/25 : 54.4 kg (120 lb)    Image Results  Renal artery duplex complete             [place] [id number]  Tel [phone] and Fax [phone]       Vascular Lab Report  [facility] US RENAL ARTERY DUPLEX COMPLETE       Patient Name:      TENZIN BECKMAN        Reading Physician:  [id number] Patti MATIAS MD  Study Date:        5/12/2025             Ordering Physician: [id number] ENRIQUE TORRES  MRN/PID:           [id number]              Technologist:       Angelina Holm RVT  Accession#:        [id number]          Technologist 2:  Date of Birth/Age: 1975 / 49 years  Encounter#:         [id number]  Gender:            F  Admission Status:  Inpatient             Location Performed: [facility]       Diagnosis/ICD: Essential primary hypertension-I10  Indication:    Benign renovascular hypertension  CPT Codes:     70986 Abdominal Visceral Renal       CONCLUSIONS:  Right Renal Artery: Right renal arteries demonstrate no evidence of hemodynamically significant stenosis. The right renal vein is widely patent. Peak systolic  velocities appear decreased within the renal arteries bilaterally.  Left Renal Artery: Left renal arteries demonstrate no evidence of hemodynamically significant stenosis. The left renal vein is widely patent. Peak systolic velocities appear decreased within the renal arteries bilaterally.     Additional Findings:  Technically difficult secondary to shadowing from bowel gas.       Imaging & Doppler Findings:     AORTA    PSV   Mid  110.3 cm/s       Renal Artery Duplex Right Kidney: 9.8 cm                           Left Kidney: 9.6 cm        Systolic       Diastolic     ARTERY           Systolic       Diastolic        61 cm/s         27 cm/s      Origin            43 cm/s        15 cm/s        57 cm/s         20 cm/s       Prox             41 cm/s        17 cm/s        43 cm/s         17 cm/s        Mid             27 cm/s        12 cm/s        55 cm/s         23 cm/s      Distal            26 cm/s        10 cm/s        19 cm/s         8 cm/s      Superior           18 cm/s        8 cm/s        24 cm/s         8 cm/s      Inferior           23 cm/s        7 cm/s                         Right                          Left                          0.6       R/A Ratio            0.4                          0.7    Resistive Index         0.6       Ao Dist Diam 1.31 cm  Mid Ao       110 cm/s          [id number] Patti Pacheco MD  Electronically signed by [id number] Patit Pacheco MD on 5/12/2025 at 1:53:18 PM       ** Final **      Physical Exam  Constitutional:       Appearance: Normal appearance.   HENT:      Mouth/Throat:      Mouth: Mucous membranes are dry.      Pharynx: Oropharynx is clear.   Eyes:      Pupils: Pupils are equal, round, and reactive to light.   Cardiovascular:      Rate and Rhythm: Normal rate and regular rhythm.   Pulmonary:      Effort: Pulmonary effort is normal.      Breath sounds: Rhonchi present.   Abdominal:      General: Bowel sounds are normal.      Palpations: Abdomen is soft.   Musculoskeletal:       Cervical back: Normal range of motion.      Left hip: Tenderness present.   Skin:     General: Skin is warm and dry.      Capillary Refill: Capillary refill takes less than 2 seconds.   Neurological:      Mental Status: She is alert and oriented to person, place, and time. Mental status is at baseline.   Psychiatric:         Mood and Affect: Mood normal.      Relevant Results                    This patient has a central line   Reason for the central line remaining today? Parenteral nutrition            Assessment & Plan  ALEIDA (acute kidney injury)    Hypokalemia    Anemia    Yesimar is a 49-year-old female with past medical history of MRDD, recent left hip replacement with postop infection currently receiving IV antibiotics at her skilled nursing facility presenting to emergency department for an elevated vancomycin trough and abnormal labs.  Patient was signed out for evaluation of an abnormal creatinine and elevated vancomycin trough.  Patient denies any pain or distress.  Patient is alert and oriented x 3.  Patient was found to have a glucose of 128, potassium 3.1, creatinine 3.24, GFR 17, hemoglobin 9.6, hematocrit 28.8.  Patient is currently receiving vancomycin 1 g every 24 hours and Zosyn 3.375 g every 6 hours for a postop left hip infection.  Patient received 1 L of normal saline in ED, medicated with Tylenol p.o. and KCl 40 Meq p.o.  Admitted for further medical management.     ALEIDA/hypokalemia/anemia  Inpatient admission to Dr. Toni Locke  NS x 1 L in ED/continue LR at 75 an hour  Hold Vancomycin at this time   K+ 3.1  KCl 40 mEq p.o.  Repeat labs in a.m.  Hgb 9.6  Monitor for bleeding  Trend hemoglobin  History of cancer  Continue renal diet with thin liquids     HTN/HLD/DM2/history of uterine cancer and laryngeal mass/MRDD/left hip replacement s/p postop wound infection  #Chronic conditions  Labs in a.m.  Vancomycin trough in a.m.  Tylenol as needed  Nursing to complete home med rec  Hold home  metformin  Insulin sliding scale  Hypoglycemia protocol  PT/OT     DVT Ppx  SCDs  No indication for chemical prophylaxis  Out of bed with assistance     5/9: Creatinine improving. Continue IVF. Repeat labs tomorrow.    5/12: Creatinine has plateaued at 2.7. Renal artery ultrasound pending. Management per Nephrology. Discharge in 24-48 hours, suspect Vanc nephropathy that could take weeks to resolve.      5/13: Creatinine is 2.8 today, was 2.7 yesterday. Will resume IVF. Renal artery ultrasound unremarkable. Dispo per Nephrology.            Toni Locke, DO

## 2025-05-13 NOTE — ASSESSMENT & PLAN NOTE
Acute interstitial nephritis  History of recent rhabdomyolysis  Anemia of chronic disease  History of idiopathic thrombocytopenic purpura  Hypertension  Overt proteinuria

## 2025-05-14 LAB
ALBUMIN SERPL BCP-MCNC: 3.1 G/DL (ref 3.4–5)
ALP SERPL-CCNC: 155 U/L (ref 33–110)
ALT SERPL W P-5'-P-CCNC: 17 U/L (ref 7–45)
ANION GAP SERPL CALC-SCNC: 15 MMOL/L (ref 10–20)
AST SERPL W P-5'-P-CCNC: 15 U/L (ref 9–39)
ATRIAL RATE: 72 BPM
BASOPHILS # BLD AUTO: 0.05 X10*3/UL (ref 0–0.1)
BASOPHILS NFR BLD AUTO: 0.7 %
BILIRUB SERPL-MCNC: 0.4 MG/DL (ref 0–1.2)
BUN SERPL-MCNC: 21 MG/DL (ref 6–23)
CALCIUM SERPL-MCNC: 8.9 MG/DL (ref 8.6–10.3)
CHLORIDE SERPL-SCNC: 104 MMOL/L (ref 98–107)
CO2 SERPL-SCNC: 25 MMOL/L (ref 21–32)
CREAT SERPL-MCNC: 2.75 MG/DL (ref 0.5–1.05)
EGFRCR SERPLBLD CKD-EPI 2021: 21 ML/MIN/1.73M*2
EOSINOPHIL # BLD AUTO: 0.13 X10*3/UL (ref 0–0.7)
EOSINOPHIL NFR BLD AUTO: 1.7 %
ERYTHROCYTE [DISTWIDTH] IN BLOOD BY AUTOMATED COUNT: 16.2 % (ref 11.5–14.5)
GLUCOSE BLD MANUAL STRIP-MCNC: 113 MG/DL (ref 74–99)
GLUCOSE BLD MANUAL STRIP-MCNC: 118 MG/DL (ref 74–99)
GLUCOSE BLD MANUAL STRIP-MCNC: 125 MG/DL (ref 74–99)
GLUCOSE BLD MANUAL STRIP-MCNC: 504 MG/DL (ref 74–99)
GLUCOSE BLD MANUAL STRIP-MCNC: 72 MG/DL (ref 74–99)
GLUCOSE SERPL-MCNC: 97 MG/DL (ref 74–99)
HCT VFR BLD AUTO: 25.9 % (ref 36–46)
HGB BLD-MCNC: 8.1 G/DL (ref 12–16)
IMM GRANULOCYTES # BLD AUTO: 0.05 X10*3/UL (ref 0–0.7)
IMM GRANULOCYTES NFR BLD AUTO: 0.7 % (ref 0–0.9)
LYMPHOCYTES # BLD AUTO: 0.9 X10*3/UL (ref 1.2–4.8)
LYMPHOCYTES NFR BLD AUTO: 12.1 %
MCH RBC QN AUTO: 29.1 PG (ref 26–34)
MCHC RBC AUTO-ENTMCNC: 31.3 G/DL (ref 32–36)
MCV RBC AUTO: 93 FL (ref 80–100)
MONOCYTES # BLD AUTO: 0.88 X10*3/UL (ref 0.1–1)
MONOCYTES NFR BLD AUTO: 11.8 %
NEUTROPHILS # BLD AUTO: 5.45 X10*3/UL (ref 1.2–7.7)
NEUTROPHILS NFR BLD AUTO: 73 %
NRBC BLD-RTO: 0 /100 WBCS (ref 0–0)
P AXIS: 54 DEGREES
P OFFSET: 197 MS
P ONSET: 157 MS
PLATELET # BLD AUTO: 344 X10*3/UL (ref 150–450)
POTASSIUM SERPL-SCNC: 3.9 MMOL/L (ref 3.5–5.3)
PR INTERVAL: 128 MS
PROT SERPL-MCNC: 5.6 G/DL (ref 6.4–8.2)
Q ONSET: 221 MS
QRS COUNT: 11 BEATS
QRS DURATION: 68 MS
QT INTERVAL: 412 MS
QTC CALCULATION(BAZETT): 451 MS
QTC FREDERICIA: 438 MS
R AXIS: 64 DEGREES
RBC # BLD AUTO: 2.78 X10*6/UL (ref 4–5.2)
RENIN PLAS-CCNC: 1.5 NG/ML/HR
SODIUM SERPL-SCNC: 140 MMOL/L (ref 136–145)
T AXIS: -63 DEGREES
T OFFSET: 427 MS
VANCOMYCIN SERPL-MCNC: 12.4 UG/ML (ref 5–20)
VENTRICULAR RATE: 72 BPM
WBC # BLD AUTO: 7.5 X10*3/UL (ref 4.4–11.3)

## 2025-05-14 PROCEDURE — 2500000001 HC RX 250 WO HCPCS SELF ADMINISTERED DRUGS (ALT 637 FOR MEDICARE OP): Performed by: INTERNAL MEDICINE

## 2025-05-14 PROCEDURE — 2500000004 HC RX 250 GENERAL PHARMACY W/ HCPCS (ALT 636 FOR OP/ED): Mod: JZ | Performed by: PHYSICIAN ASSISTANT

## 2025-05-14 PROCEDURE — 2500000001 HC RX 250 WO HCPCS SELF ADMINISTERED DRUGS (ALT 637 FOR MEDICARE OP): Performed by: NURSE PRACTITIONER

## 2025-05-14 PROCEDURE — 85025 COMPLETE CBC W/AUTO DIFF WBC: CPT | Performed by: INTERNAL MEDICINE

## 2025-05-14 PROCEDURE — 80053 COMPREHEN METABOLIC PANEL: CPT | Performed by: INTERNAL MEDICINE

## 2025-05-14 PROCEDURE — 82947 ASSAY GLUCOSE BLOOD QUANT: CPT

## 2025-05-14 PROCEDURE — 2500000005 HC RX 250 GENERAL PHARMACY W/O HCPCS: Performed by: INTERNAL MEDICINE

## 2025-05-14 PROCEDURE — 2500000002 HC RX 250 W HCPCS SELF ADMINISTERED DRUGS (ALT 637 FOR MEDICARE OP, ALT 636 FOR OP/ED): Performed by: NURSE PRACTITIONER

## 2025-05-14 PROCEDURE — 1100000001 HC PRIVATE ROOM DAILY

## 2025-05-14 PROCEDURE — 80202 ASSAY OF VANCOMYCIN: CPT | Performed by: INTERNAL MEDICINE

## 2025-05-14 RX ORDER — INSULIN LISPRO 100 [IU]/ML
14 INJECTION, SOLUTION INTRAVENOUS; SUBCUTANEOUS ONCE
Status: DISCONTINUED | OUTPATIENT
Start: 2025-05-14 | End: 2025-05-14

## 2025-05-14 RX ADMIN — ONDANSETRON 4 MG: 2 INJECTION INTRAMUSCULAR; INTRAVENOUS at 14:32

## 2025-05-14 RX ADMIN — DOCUSATE SODIUM 100 MG: 100 CAPSULE, LIQUID FILLED ORAL at 08:50

## 2025-05-14 RX ADMIN — ASPIRIN 81 MG: 81 TABLET, COATED ORAL at 08:50

## 2025-05-14 RX ADMIN — ONDANSETRON 4 MG: 2 INJECTION INTRAMUSCULAR; INTRAVENOUS at 04:37

## 2025-05-14 RX ADMIN — CARVEDILOL 12.5 MG: 12.5 TABLET, FILM COATED ORAL at 08:50

## 2025-05-14 RX ADMIN — INSULIN LISPRO 10 UNITS: 100 INJECTION, SOLUTION INTRAVENOUS; SUBCUTANEOUS at 06:40

## 2025-05-14 RX ADMIN — Medication 3 MG: at 21:33

## 2025-05-14 RX ADMIN — ACETAMINOPHEN 650 MG: 325 TABLET, FILM COATED ORAL at 22:12

## 2025-05-14 RX ADMIN — CARVEDILOL 12.5 MG: 12.5 TABLET, FILM COATED ORAL at 21:33

## 2025-05-14 RX ADMIN — FAMOTIDINE 10 MG: 20 TABLET, FILM COATED ORAL at 08:50

## 2025-05-14 ASSESSMENT — COGNITIVE AND FUNCTIONAL STATUS - GENERAL
TOILETING: A LOT
TURNING FROM BACK TO SIDE WHILE IN FLAT BAD: A LITTLE
STANDING UP FROM CHAIR USING ARMS: A LOT
MOVING TO AND FROM BED TO CHAIR: A LOT
MOVING FROM LYING ON BACK TO SITTING ON SIDE OF FLAT BED WITH BEDRAILS: A LITTLE
HELP NEEDED FOR BATHING: A LITTLE
CLIMB 3 TO 5 STEPS WITH RAILING: A LOT
PERSONAL GROOMING: A LITTLE
DAILY ACTIVITIY SCORE: 16
MOBILITY SCORE: 14
DRESSING REGULAR UPPER BODY CLOTHING: A LOT
WALKING IN HOSPITAL ROOM: A LOT
DRESSING REGULAR LOWER BODY CLOTHING: A LOT

## 2025-05-14 ASSESSMENT — PAIN - FUNCTIONAL ASSESSMENT
PAIN_FUNCTIONAL_ASSESSMENT: UNABLE TO SELF-REPORT
PAIN_FUNCTIONAL_ASSESSMENT: 0-10

## 2025-05-14 ASSESSMENT — PAIN SCALES - GENERAL
PAINLEVEL_OUTOF10: 0 - NO PAIN
PAINLEVEL_OUTOF10: 3

## 2025-05-14 ASSESSMENT — PAIN DESCRIPTION - LOCATION: LOCATION: BACK

## 2025-05-14 NOTE — ASSESSMENT & PLAN NOTE
Acute interstitial nephritis  Anemia of chronic disease  History of idiopathic thrombocytopenic purpura  Hypertension  Overt proteinuria

## 2025-05-14 NOTE — PROGRESS NOTES
Tenzin Cherry is a 49 y.o. female on day 7 of admission presenting with ALEIDA (acute kidney injury).      Subjective   No events overnight. Patient seen and examined at bedside. She has no complaints. P2P performed, SNF denied.        Objective     Last Recorded Vitals  /88   Pulse 71   Temp 36.3 °C (97.3 °F)   Resp 18   Wt 54.4 kg (120 lb)   SpO2 98%   Intake/Output last 3 Shifts:    Intake/Output Summary (Last 24 hours) at 5/14/2025 1605  Last data filed at 5/14/2025 0423  Gross per 24 hour   Intake --   Output 750 ml   Net -750 ml       Admission Weight  Weight: 54.4 kg (120 lb) (05/07/25 1903)    Daily Weight  05/07/25 : 54.4 kg (120 lb)    Image Results  Electrocardiogram, 12-lead PRN ACS symptoms  Normal sinus rhythm  ST & T wave abnormality, consider anterior ischemia  Abnormal ECG    Confirmed by Sam Christina ([id number]) on 5/14/2025 3:14:06 PM      Physical Exam  Constitutional:       Appearance: Normal appearance.   HENT:      Mouth/Throat:      Mouth: Mucous membranes are dry.      Pharynx: Oropharynx is clear.   Eyes:      Pupils: Pupils are equal, round, and reactive to light.   Cardiovascular:      Rate and Rhythm: Normal rate and regular rhythm.   Pulmonary:      Effort: Pulmonary effort is normal.      Breath sounds: Rhonchi present.   Abdominal:      General: Bowel sounds are normal.      Palpations: Abdomen is soft.   Musculoskeletal:      Cervical back: Normal range of motion.      Left hip: Tenderness present.   Skin:     General: Skin is warm and dry.      Capillary Refill: Capillary refill takes less than 2 seconds.   Neurological:      Mental Status: She is alert and oriented to person, place, and time. Mental status is at baseline.   Psychiatric:         Mood and Affect: Mood normal.      Relevant Results                    This patient has a central line   Reason for the central line remaining today? Parenteral nutrition            Assessment & Plan  ALEIDA (acute kidney  injury)    Hypokalemia    Anemia    Yesimar is a 49-year-old female with past medical history of MRDD, recent left hip replacement with postop infection currently receiving IV antibiotics at her skilled nursing facility presenting to emergency department for an elevated vancomycin trough and abnormal labs.  Patient was signed out for evaluation of an abnormal creatinine and elevated vancomycin trough.  Patient denies any pain or distress.  Patient is alert and oriented x 3.  Patient was found to have a glucose of 128, potassium 3.1, creatinine 3.24, GFR 17, hemoglobin 9.6, hematocrit 28.8.  Patient is currently receiving vancomycin 1 g every 24 hours and Zosyn 3.375 g every 6 hours for a postop left hip infection.  Patient received 1 L of normal saline in ED, medicated with Tylenol p.o. and KCl 40 Meq p.o.  Admitted for further medical management.     ALEIDA/hypokalemia/anemia  Inpatient admission to Dr. Toni Locke  NS x 1 L in ED/continue LR at 75 an hour  Hold Vancomycin at this time   K+ 3.1  KCl 40 mEq p.o.  Repeat labs in a.m.  Hgb 9.6  Monitor for bleeding  Trend hemoglobin  History of cancer  Continue renal diet with thin liquids     HTN/HLD/DM2/history of uterine cancer and laryngeal mass/MRDD/left hip replacement s/p postop wound infection  #Chronic conditions  Labs in a.m.  Vancomycin trough in a.m.  Tylenol as needed  Nursing to complete home med rec  Hold home metformin  Insulin sliding scale  Hypoglycemia protocol  PT/OT     DVT Ppx  SCDs  No indication for chemical prophylaxis  Out of bed with assistance     5/9: Creatinine improving. Continue IVF. Repeat labs tomorrow.    5/12: Creatinine has plateaued at 2.7. Renal artery ultrasound pending. Management per Nephrology. Discharge in 24-48 hours, suspect Vanc nephropathy that could take weeks to resolve.      5/13: Creatinine is 2.8 today, was 2.7 yesterday. Will resume IVF. Renal artery ultrasound unremarkable. Dispo per Nephrology.     5/14:  Patient to resume Vancomycin tomorrow. Appeal for SNF once antibiotic course clarified. Creatinine improved to 2.7, continue IVF.           Toni Locke, DO

## 2025-05-14 NOTE — CARE PLAN
The patient's goals for the shift include  safety and comfort    The clinical goals for the shift include pt will be safe and comftorable

## 2025-05-14 NOTE — CARE PLAN
Notified of patient's blood sugar being over 500.  Ordered 14 units of lispro and instructed nurse to recheck blood sugar in an hour and hold breakfast tray until we know blood sugar is improving.     Apparently nursing gave patient 10 units of insulin prior to ordering the 14 units and blood sugar was rechecked and was 72 so order was discontinued.

## 2025-05-14 NOTE — PROGRESS NOTES
Vancomycin Dosing by Pharmacy- FOLLOW UP    Tenzin Cherry is a 49 y.o. year old female who Pharmacy has been consulted for vancomycin dosing for cellulitis, skin and soft tissue. Based on the patient's indication and renal status this patient is being dosed based on a goal trough/random level of 10-15.     Renal function is currently stable - Scr seems to have peaked around 2.7mg/dL.      We have been holding vancomycin since patient's admission due to supra-therapeutic levels. Dr. Locke was contacted today, and stated appropriate to restart vancomycin therapy, through her previously anticipated duration of 25.     Most recent trough level: 12.4 mcg/mL    Visit Vitals  /84 (BP Location: Left arm, Patient Position: Lying)   Pulse 71   Temp 36.5 °C (97.7 °F) (Temporal)   Resp 18        Lab Results   Component Value Date    CREATININE 2.75 (H) 2025    CREATININE 2.89 (H) 2025    CREATININE 2.78 (H) 2025    CREATININE 2.70 (H) 2025        Patient weight is as follows:   Vitals:    25 1903   Weight: 54.4 kg (120 lb)       Cultures:  No results found for the encounter in last 14 days.       I/O last 3 completed shifts:  In: 505 (9.3 mL/kg) [I.V.:505 (9.3 mL/kg)]  Out: 1100 (20.2 mL/kg) [Urine:1100 (0.6 mL/kg/hr)]  Dosing Weight: 54.4 kg   I/O during current shift:  No intake/output data recorded.    Temp (24hrs), Av.4 °C (97.5 °F), Min:36 °C (96.8 °F), Max:36.7 °C (98.1 °F)      Assessment/Plan    Resulting trough level of 12.4mcg/mL is now within goal trough level of 10-15mcg/mL. Out of an abundance of caution, we will HOLD vancomycin for 1-2 more days, and then continue dosing through 25 (above discussed with Dr. Locke, attending physician). - Scr seems to have peaked at about 2.7mg/dL. If patient continues to clear vancomycin, shows stability in Scr, and repeat trough level is around 10mcg/mL, anticipate a dose of vancomycin for 5/15/25.   The next level  will be obtained on 5/15/25 with AM labs. May be obtained sooner if clinically indicated.   Will continue to monitor renal function daily while on vancomycin and order serum creatinine at least every 48 hours if not already ordered.  Follow for continued vancomycin needs, clinical response, and signs/symptoms of toxicity.     Pamela Haas, PharmD, BCPS   5/14/2025 9:40 AM

## 2025-05-14 NOTE — PROGRESS NOTES
Subjective   No  Voice Complaints  Blood pressure stable  Awaiting insurance approval for patient discharge.  Renal chemistries are stable  Objective          Vitals 24HR  Heart Rate:  [70-75]   Temp:  [36 °C (96.8 °F)-36.7 °C (98.1 °F)]   Resp:  [16-18]   BP: (123-147)/(76-85)   SpO2:  [95 %-98 %]         Intake/Output last 3 Shifts:    Intake/Output Summary (Last 24 hours) at 5/14/2025 1152  Last data filed at 5/14/2025 0423  Gross per 24 hour   Intake 338.75 ml   Output 750 ml   Net -411.25 ml                                                                                                                                                                   Physical Exam  General appearance; alert oriented  HEENT; there is no icterus pupils react to light accommodation  Neck; no JVD no bruit no thyromegaly no adenopathy  Lungs; clear to auscultation and percussion  Heart; regular rate no gallop no rubs no thrills   Abdomen ;active peristalsis, no guarding no rubs  Extremities; no edema  Neurological; no clonus no tremors no asterixis  Relevant Results  Results for orders placed or performed during the hospital encounter of 05/07/25 (from the past 24 hours)   Electrocardiogram, 12-lead PRN ACS symptoms   Result Value Ref Range    Ventricular Rate 72 BPM    Atrial Rate 72 BPM    LA Interval 128 ms    QRS Duration 68 ms    QT Interval 412 ms    QTC Calculation(Bazett) 451 ms    P Axis 54 degrees    R Axis 64 degrees    T Axis -63 degrees    QRS Count 11 beats    Q Onset 221 ms    P Onset 157 ms    P Offset 197 ms    T Offset 427 ms    QTC Fredericia 438 ms   POCT GLUCOSE   Result Value Ref Range    POCT Glucose 113 (H) 74 - 99 mg/dL   POCT GLUCOSE   Result Value Ref Range    POCT Glucose 138 (H) 74 - 99 mg/dL   CBC and Auto Differential   Result Value Ref Range    WBC 7.5 4.4 - 11.3 x10*3/uL    nRBC 0.0 0.0 - 0.0 /100 WBCs    RBC 2.78 (L) 4.00 - 5.20 x10*6/uL    Hemoglobin 8.1 (L) 12.0 - 16.0 g/dL    Hematocrit  25.9 (L) 36.0 - 46.0 %    MCV 93 80 - 100 fL    MCH 29.1 26.0 - 34.0 pg    MCHC 31.3 (L) 32.0 - 36.0 g/dL    RDW 16.2 (H) 11.5 - 14.5 %    Platelets 344 150 - 450 x10*3/uL    Neutrophils % 73.0 40.0 - 80.0 %    Immature Granulocytes %, Automated 0.7 0.0 - 0.9 %    Lymphocytes % 12.1 13.0 - 44.0 %    Monocytes % 11.8 2.0 - 10.0 %    Eosinophils % 1.7 0.0 - 6.0 %    Basophils % 0.7 0.0 - 2.0 %    Neutrophils Absolute 5.45 1.20 - 7.70 x10*3/uL    Immature Granulocytes Absolute, Automated 0.05 0.00 - 0.70 x10*3/uL    Lymphocytes Absolute 0.90 (L) 1.20 - 4.80 x10*3/uL    Monocytes Absolute 0.88 0.10 - 1.00 x10*3/uL    Eosinophils Absolute 0.13 0.00 - 0.70 x10*3/uL    Basophils Absolute 0.05 0.00 - 0.10 x10*3/uL   Comprehensive Metabolic Panel   Result Value Ref Range    Glucose 97 74 - 99 mg/dL    Sodium 140 136 - 145 mmol/L    Potassium 3.9 3.5 - 5.3 mmol/L    Chloride 104 98 - 107 mmol/L    Bicarbonate 25 21 - 32 mmol/L    Anion Gap 15 10 - 20 mmol/L    Urea Nitrogen 21 6 - 23 mg/dL    Creatinine 2.75 (H) 0.50 - 1.05 mg/dL    eGFR 21 (L) >60 mL/min/1.73m*2    Calcium 8.9 8.6 - 10.3 mg/dL    Albumin 3.1 (L) 3.4 - 5.0 g/dL    Alkaline Phosphatase 155 (H) 33 - 110 U/L    Total Protein 5.6 (L) 6.4 - 8.2 g/dL    AST 15 9 - 39 U/L    Bilirubin, Total 0.4 0.0 - 1.2 mg/dL    ALT 17 7 - 45 U/L   Vancomycin   Result Value Ref Range    Vancomycin 12.4 5.0 - 20.0 ug/mL   POCT GLUCOSE   Result Value Ref Range    POCT Glucose 504 (H) 74 - 99 mg/dL   POCT GLUCOSE   Result Value Ref Range    POCT Glucose 72 (L) 74 - 99 mg/dL   POCT GLUCOSE   Result Value Ref Range    POCT Glucose 118 (H) 74 - 99 mg/dL                       Assessment & Plan  ALEIDA (acute kidney injury)  Acute interstitial nephritis  Anemia of chronic disease  History of idiopathic thrombocytopenic purpura  Hypertension  Overt proteinuria  Hypokalemia    Anemia  Plan  Continue current meds   monitor renal chemistries  Awaiting insurance approval for patient discharge to  skilled nursing      I spent 20 minutes in the professional and overall care of this patient.      Arvin Sharma MD

## 2025-05-14 NOTE — CARE PLAN
Peer to peer performed.  Parents review , patient does not qualify for skilled therapy based on her therapy evaluations.  However, she may qualify for skilled nursing if her antibiotics are twice a day or more.  Patient's vancomycin level is therapeutic today.  Pharmacy will be dosing it tomorrow.  Per , the case can be appealed if patient is to require skilled nursing for IV antibiotics.

## 2025-05-15 LAB
ALBUMIN SERPL BCP-MCNC: 3.1 G/DL (ref 3.4–5)
ALP SERPL-CCNC: 160 U/L (ref 33–110)
ALT SERPL W P-5'-P-CCNC: 15 U/L (ref 7–45)
ANION GAP SERPL CALC-SCNC: 11 MMOL/L (ref 10–20)
AST SERPL W P-5'-P-CCNC: 14 U/L (ref 9–39)
BASOPHILS # BLD AUTO: 0.04 X10*3/UL (ref 0–0.1)
BASOPHILS NFR BLD AUTO: 0.4 %
BILIRUB SERPL-MCNC: 0.4 MG/DL (ref 0–1.2)
BUN SERPL-MCNC: 22 MG/DL (ref 6–23)
CALCIUM SERPL-MCNC: 8.9 MG/DL (ref 8.6–10.3)
CHLORIDE SERPL-SCNC: 105 MMOL/L (ref 98–107)
CO2 SERPL-SCNC: 28 MMOL/L (ref 21–32)
CREAT SERPL-MCNC: 3 MG/DL (ref 0.5–1.05)
EGFRCR SERPLBLD CKD-EPI 2021: 19 ML/MIN/1.73M*2
EOSINOPHIL # BLD AUTO: 0.09 X10*3/UL (ref 0–0.7)
EOSINOPHIL NFR BLD AUTO: 1 %
ERYTHROCYTE [DISTWIDTH] IN BLOOD BY AUTOMATED COUNT: 16.2 % (ref 11.5–14.5)
GLUCOSE BLD MANUAL STRIP-MCNC: 129 MG/DL (ref 74–99)
GLUCOSE BLD MANUAL STRIP-MCNC: 133 MG/DL (ref 74–99)
GLUCOSE BLD MANUAL STRIP-MCNC: 134 MG/DL (ref 74–99)
GLUCOSE BLD MANUAL STRIP-MCNC: 183 MG/DL (ref 74–99)
GLUCOSE SERPL-MCNC: 122 MG/DL (ref 74–99)
HCT VFR BLD AUTO: 25.7 % (ref 36–46)
HGB BLD-MCNC: 8.1 G/DL (ref 12–16)
IMM GRANULOCYTES # BLD AUTO: 0.05 X10*3/UL (ref 0–0.7)
IMM GRANULOCYTES NFR BLD AUTO: 0.5 % (ref 0–0.9)
LYMPHOCYTES # BLD AUTO: 0.84 X10*3/UL (ref 1.2–4.8)
LYMPHOCYTES NFR BLD AUTO: 9.2 %
MCH RBC QN AUTO: 29.1 PG (ref 26–34)
MCHC RBC AUTO-ENTMCNC: 31.5 G/DL (ref 32–36)
MCV RBC AUTO: 92 FL (ref 80–100)
MONOCYTES # BLD AUTO: 1 X10*3/UL (ref 0.1–1)
MONOCYTES NFR BLD AUTO: 11 %
NEUTROPHILS # BLD AUTO: 7.08 X10*3/UL (ref 1.2–7.7)
NEUTROPHILS NFR BLD AUTO: 77.9 %
NRBC BLD-RTO: 0 /100 WBCS (ref 0–0)
PLATELET # BLD AUTO: 309 X10*3/UL (ref 150–450)
POTASSIUM SERPL-SCNC: 4 MMOL/L (ref 3.5–5.3)
PROT SERPL-MCNC: 5.6 G/DL (ref 6.4–8.2)
RBC # BLD AUTO: 2.78 X10*6/UL (ref 4–5.2)
SODIUM SERPL-SCNC: 140 MMOL/L (ref 136–145)
VANCOMYCIN SERPL-MCNC: 9.8 UG/ML (ref 5–20)
WBC # BLD AUTO: 9.1 X10*3/UL (ref 4.4–11.3)

## 2025-05-15 PROCEDURE — 1100000001 HC PRIVATE ROOM DAILY

## 2025-05-15 PROCEDURE — 2500000004 HC RX 250 GENERAL PHARMACY W/ HCPCS (ALT 636 FOR OP/ED): Mod: JZ | Performed by: INTERNAL MEDICINE

## 2025-05-15 PROCEDURE — 2500000002 HC RX 250 W HCPCS SELF ADMINISTERED DRUGS (ALT 637 FOR MEDICARE OP, ALT 636 FOR OP/ED): Performed by: NURSE PRACTITIONER

## 2025-05-15 PROCEDURE — 80053 COMPREHEN METABOLIC PANEL: CPT | Performed by: INTERNAL MEDICINE

## 2025-05-15 PROCEDURE — 2500000005 HC RX 250 GENERAL PHARMACY W/O HCPCS: Performed by: INTERNAL MEDICINE

## 2025-05-15 PROCEDURE — 2500000004 HC RX 250 GENERAL PHARMACY W/ HCPCS (ALT 636 FOR OP/ED): Mod: JZ | Performed by: NURSE PRACTITIONER

## 2025-05-15 PROCEDURE — 2500000001 HC RX 250 WO HCPCS SELF ADMINISTERED DRUGS (ALT 637 FOR MEDICARE OP): Performed by: INTERNAL MEDICINE

## 2025-05-15 PROCEDURE — 82947 ASSAY GLUCOSE BLOOD QUANT: CPT

## 2025-05-15 PROCEDURE — 2500000001 HC RX 250 WO HCPCS SELF ADMINISTERED DRUGS (ALT 637 FOR MEDICARE OP): Performed by: NURSE PRACTITIONER

## 2025-05-15 PROCEDURE — 85025 COMPLETE CBC W/AUTO DIFF WBC: CPT | Performed by: INTERNAL MEDICINE

## 2025-05-15 PROCEDURE — 80202 ASSAY OF VANCOMYCIN: CPT | Performed by: INTERNAL MEDICINE

## 2025-05-15 RX ORDER — VANCOMYCIN HYDROCHLORIDE 500 MG/100ML
500 INJECTION, SOLUTION INTRAVENOUS ONCE
Status: COMPLETED | OUTPATIENT
Start: 2025-05-15 | End: 2025-05-15

## 2025-05-15 RX ORDER — CALCIUM CARBONATE 200(500)MG
500 TABLET,CHEWABLE ORAL EVERY 8 HOURS PRN
Status: DISCONTINUED | OUTPATIENT
Start: 2025-05-15 | End: 2025-05-22 | Stop reason: HOSPADM

## 2025-05-15 RX ADMIN — FAMOTIDINE 10 MG: 20 TABLET, FILM COATED ORAL at 10:13

## 2025-05-15 RX ADMIN — ALTEPLASE 2 MG: 2.2 INJECTION, POWDER, LYOPHILIZED, FOR SOLUTION INTRAVENOUS at 03:24

## 2025-05-15 RX ADMIN — DOCUSATE SODIUM 100 MG: 100 CAPSULE, LIQUID FILLED ORAL at 10:13

## 2025-05-15 RX ADMIN — INSULIN LISPRO 2 UNITS: 100 INJECTION, SOLUTION INTRAVENOUS; SUBCUTANEOUS at 17:31

## 2025-05-15 RX ADMIN — ACETAMINOPHEN 650 MG: 325 TABLET, FILM COATED ORAL at 10:13

## 2025-05-15 RX ADMIN — CARVEDILOL 12.5 MG: 12.5 TABLET, FILM COATED ORAL at 10:13

## 2025-05-15 RX ADMIN — ASPIRIN 81 MG: 81 TABLET, COATED ORAL at 10:13

## 2025-05-15 RX ADMIN — CARVEDILOL 12.5 MG: 12.5 TABLET, FILM COATED ORAL at 20:38

## 2025-05-15 RX ADMIN — VANCOMYCIN HYDROCHLORIDE 500 MG: 500 INJECTION, SOLUTION INTRAVENOUS at 10:12

## 2025-05-15 RX ADMIN — Medication 3 MG: at 20:38

## 2025-05-15 ASSESSMENT — PAIN DESCRIPTION - ORIENTATION: ORIENTATION: LEFT

## 2025-05-15 ASSESSMENT — PAIN DESCRIPTION - LOCATION: LOCATION: LEG

## 2025-05-15 ASSESSMENT — PAIN SCALES - GENERAL
PAINLEVEL_OUTOF10: 0 - NO PAIN
PAINLEVEL_OUTOF10: 0 - NO PAIN
PAINLEVEL_OUTOF10: 3

## 2025-05-15 ASSESSMENT — PAIN - FUNCTIONAL ASSESSMENT
PAIN_FUNCTIONAL_ASSESSMENT: 0-10
PAIN_FUNCTIONAL_ASSESSMENT: 0-10

## 2025-05-15 NOTE — PROGRESS NOTES
Subjective   No  Voice Complaints  Blood pressure stable  Awaiting insurance approval for patient discharge.  Today patient is here creatinine is at 3  Aldosterone level is low less than 3  Renin level is normal 1.5  Objective          Vitals 24HR  Heart Rate:  [71-74]   Temp:  [36.1 °C (97 °F)-36.7 °C (98.1 °F)]   Resp:  [15-17]   BP: (131-141)/(85-88)   SpO2:  [93 %-98 %]         Intake/Output last 3 Shifts:    Intake/Output Summary (Last 24 hours) at 5/15/2025 1500  Last data filed at 5/15/2025 1111  Gross per 24 hour   Intake 1495 ml   Output 550 ml   Net 945 ml                                                                                                                                                                   Physical Exam  General appearance; alert oriented  HEENT; there is no icterus pupils react to light accommodation  Neck; no JVD no bruit no thyromegaly no adenopathy  Lungs; clear to auscultation and percussion  Heart; regular rate no gallop no rubs no thrills   Abdomen ;active peristalsis, no guarding no rubs  Extremities; no edema  Neurological; no clonus no tremors no asterixis  Relevant Results  Results for orders placed or performed during the hospital encounter of 05/07/25 (from the past 24 hours)   POCT GLUCOSE   Result Value Ref Range    POCT Glucose 113 (H) 74 - 99 mg/dL   POCT GLUCOSE   Result Value Ref Range    POCT Glucose 125 (H) 74 - 99 mg/dL   CBC and Auto Differential   Result Value Ref Range    WBC 9.1 4.4 - 11.3 x10*3/uL    nRBC 0.0 0.0 - 0.0 /100 WBCs    RBC 2.78 (L) 4.00 - 5.20 x10*6/uL    Hemoglobin 8.1 (L) 12.0 - 16.0 g/dL    Hematocrit 25.7 (L) 36.0 - 46.0 %    MCV 92 80 - 100 fL    MCH 29.1 26.0 - 34.0 pg    MCHC 31.5 (L) 32.0 - 36.0 g/dL    RDW 16.2 (H) 11.5 - 14.5 %    Platelets 309 150 - 450 x10*3/uL    Neutrophils % 77.9 40.0 - 80.0 %    Immature Granulocytes %, Automated 0.5 0.0 - 0.9 %    Lymphocytes % 9.2 13.0 - 44.0 %    Monocytes % 11.0 2.0 - 10.0 %     Eosinophils % 1.0 0.0 - 6.0 %    Basophils % 0.4 0.0 - 2.0 %    Neutrophils Absolute 7.08 1.20 - 7.70 x10*3/uL    Immature Granulocytes Absolute, Automated 0.05 0.00 - 0.70 x10*3/uL    Lymphocytes Absolute 0.84 (L) 1.20 - 4.80 x10*3/uL    Monocytes Absolute 1.00 0.10 - 1.00 x10*3/uL    Eosinophils Absolute 0.09 0.00 - 0.70 x10*3/uL    Basophils Absolute 0.04 0.00 - 0.10 x10*3/uL   Comprehensive Metabolic Panel   Result Value Ref Range    Glucose 122 (H) 74 - 99 mg/dL    Sodium 140 136 - 145 mmol/L    Potassium 4.0 3.5 - 5.3 mmol/L    Chloride 105 98 - 107 mmol/L    Bicarbonate 28 21 - 32 mmol/L    Anion Gap 11 10 - 20 mmol/L    Urea Nitrogen 22 6 - 23 mg/dL    Creatinine 3.00 (H) 0.50 - 1.05 mg/dL    eGFR 19 (L) >60 mL/min/1.73m*2    Calcium 8.9 8.6 - 10.3 mg/dL    Albumin 3.1 (L) 3.4 - 5.0 g/dL    Alkaline Phosphatase 160 (H) 33 - 110 U/L    Total Protein 5.6 (L) 6.4 - 8.2 g/dL    AST 14 9 - 39 U/L    Bilirubin, Total 0.4 0.0 - 1.2 mg/dL    ALT 15 7 - 45 U/L   Vancomycin   Result Value Ref Range    Vancomycin 9.8 5.0 - 20.0 ug/mL   POCT GLUCOSE   Result Value Ref Range    POCT Glucose 129 (H) 74 - 99 mg/dL   POCT GLUCOSE   Result Value Ref Range    POCT Glucose 134 (H) 74 - 99 mg/dL                       Assessment & Plan  ALEIDA (acute kidney injury)  Acute interstitial nephritis  Anemia of chronic disease  History of idiopathic thrombocytopenic purpura  Hypertension  Overt proteinuria  Hypokalemia    Anemia  Plan  Continue carvedilol  monitor renal chemistries  Awaiting insurance approval for patient discharge to skilled nursing      I spent 20 minutes in the professional and overall care of this patient.      Arvin Sharma MD

## 2025-05-15 NOTE — CARE PLAN
The patient's goals for the shift include      The clinical goals for the shift include pain management and safety      Problem: Pain - Adult  Goal: Verbalizes/displays adequate comfort level or baseline comfort level  Outcome: Progressing     Problem: Safety - Adult  Goal: Free from fall injury  Outcome: Progressing     Problem: Discharge Planning  Goal: Discharge to home or other facility with appropriate resources  Outcome: Progressing     Problem: Chronic Conditions and Co-morbidities  Goal: Patient's chronic conditions and co-morbidity symptoms are monitored and maintained or improved  Outcome: Progressing     Problem: Nutrition  Goal: Nutrient intake appropriate for maintaining nutritional needs  Outcome: Progressing     Problem: Fall/Injury  Goal: Not fall by end of shift  Outcome: Progressing     Problem: Fall/Injury  Goal: Be free from injury by end of the shift  Outcome: Progressing     Problem: Fall/Injury  Goal: Verbalize understanding of personal risk factors for fall in the hospital  Outcome: Progressing     Problem: Fall/Injury  Goal: Verbalize understanding of risk factor reduction measures to prevent injury from fall in the home  Outcome: Progressing     Problem: Fall/Injury  Goal: Use assistive devices by end of the shift  Outcome: Progressing     Problem: Fall/Injury  Goal: Pace activities to prevent fatigue by end of the shift  Outcome: Progressing     Problem: PICC line  Goal: I will remain free from symptoms of infection  Outcome: Progressing     Problem: Skin  Goal: Participates in plan/prevention/treatment measures  Outcome: Progressing     Problem: Skin  Goal: Prevent/manage excess moisture  Outcome: Progressing     Problem: Skin  Goal: Prevent/minimize sheer/friction injuries  Outcome: Progressing     Problem: Skin  Goal: Promote/optimize nutrition  Outcome: Progressing     Problem: Skin  Goal: Promote skin healing  Outcome: Progressing

## 2025-05-15 NOTE — PROGRESS NOTES
Vancomycin Dosing by Pharmacy- FOLLOW UP    Tenzin Cherry is a 49 y.o. year old female who Pharmacy has been consulted for vancomycin dosing for cellulitis, skin and soft tissue. Based on the patient's indication and renal status this patient is being dosed based on a goal trough/random level of 10-15.     Renal function is currently declining Scr remy for 2.7mg/L to 3mg/dL in the past 24 hours.     We have been holding vancomycin since patient's admission on 25 (last dose given 25) due to supra-therapeutic levels. Dr. Locke was contacted on 25, and stated appropriate to restart vancomycin therapy, through her previously anticipated duration of 25.     Most recent trough level: 9.8 mcg/mL    Visit Vitals  /85   Pulse 71   Temp 36.1 °C (97 °F)   Resp 17        Lab Results   Component Value Date    CREATININE 3.00 (H) 05/15/2025    CREATININE 2.75 (H) 2025    CREATININE 2.89 (H) 2025    CREATININE 2.78 (H) 2025        Patient weight is as follows:   Vitals:    25 1903   Weight: 54.4 kg (120 lb)       Cultures:  No results found for the encounter in last 14 days.       I/O last 3 completed shifts:  In: - (0 mL/kg)   Out: 1300 (23.9 mL/kg) [Urine:1300 (0.7 mL/kg/hr)]  Dosing Weight: 54.4 kg   I/O during current shift:  No intake/output data recorded.    Temp (24hrs), Av.3 °C (97.4 °F), Min:36.1 °C (97 °F), Max:36.7 °C (98.1 °F)      Assessment/Plan    Resulting trough level of 9.8mcg/mL is slightly sub-therapeutic for goal range of 10-15mcg/mL for indication of cellulitis, skin and soft tissue infection. Will give vancomycin 500mg IV x 1 dose today. Note that her previous stop date of vancomycin therapy was 25, plan to continue dosing until that time. Renal function declined overnight, however, patient did clear some vancomycin. Anticipate that if Scr remains around 2.5-3mg/dL that patient will need vancomycin 500mg every 24-48 hours.   The next level  will be obtained on 5/16/25 at 0800, ~24 hours post dose. May be obtained sooner if clinically indicated.   Will continue to monitor renal function daily while on vancomycin and order serum creatinine at least every 48 hours if not already ordered.  Follow for continued vancomycin needs, clinical response, and signs/symptoms of toxicity.     Pamela Haas, PharmD, BCPS   5/15/2025 7:51 AM

## 2025-05-15 NOTE — CARE PLAN
The patient's goals for the shift include  safety and comfort  Problem: Pain - Adult  Goal: Verbalizes/displays adequate comfort level or baseline comfort level  Outcome: Progressing     Problem: Safety - Adult  Goal: Free from fall injury  Outcome: Progressing     Problem: Discharge Planning  Goal: Discharge to home or other facility with appropriate resources  Outcome: Progressing     Problem: Chronic Conditions and Co-morbidities  Goal: Patient's chronic conditions and co-morbidity symptoms are monitored and maintained or improved  Outcome: Progressing     Problem: Nutrition  Goal: Nutrient intake appropriate for maintaining nutritional needs  Outcome: Progressing     Problem: Fall/Injury  Goal: Not fall by end of shift  Outcome: Progressing  Goal: Be free from injury by end of the shift  Outcome: Progressing  Goal: Verbalize understanding of personal risk factors for fall in the hospital  Outcome: Progressing  Goal: Verbalize understanding of risk factor reduction measures to prevent injury from fall in the home  Outcome: Progressing  Goal: Use assistive devices by end of the shift  Outcome: Progressing  Goal: Pace activities to prevent fatigue by end of the shift  Outcome: Progressing     Problem: PICC line  Goal: I will remain free from symptoms of infection  Outcome: Progressing     Problem: Skin  Goal: Participates in plan/prevention/treatment measures  Outcome: Progressing  Goal: Prevent/manage excess moisture  Outcome: Progressing  Goal: Prevent/minimize sheer/friction injuries  Outcome: Progressing  Goal: Promote/optimize nutrition  Outcome: Progressing  Goal: Promote skin healing  Outcome: Progressing       The clinical goals for the shift include Pt will remain safe and comfortable during the shift

## 2025-05-16 LAB
ALBUMIN SERPL BCP-MCNC: 3.2 G/DL (ref 3.4–5)
ALP SERPL-CCNC: 152 U/L (ref 33–110)
ALT SERPL W P-5'-P-CCNC: 13 U/L (ref 7–45)
ANION GAP SERPL CALC-SCNC: 12 MMOL/L (ref 10–20)
AST SERPL W P-5'-P-CCNC: 14 U/L (ref 9–39)
BASOPHILS # BLD AUTO: 0.04 X10*3/UL (ref 0–0.1)
BASOPHILS NFR BLD AUTO: 0.6 %
BILIRUB SERPL-MCNC: 0.5 MG/DL (ref 0–1.2)
BUN SERPL-MCNC: 21 MG/DL (ref 6–23)
CALCIUM SERPL-MCNC: 9 MG/DL (ref 8.6–10.3)
CHLORIDE SERPL-SCNC: 105 MMOL/L (ref 98–107)
CO2 SERPL-SCNC: 26 MMOL/L (ref 21–32)
CREAT SERPL-MCNC: 2.9 MG/DL (ref 0.5–1.05)
EGFRCR SERPLBLD CKD-EPI 2021: 20 ML/MIN/1.73M*2
EOSINOPHIL # BLD AUTO: 0.08 X10*3/UL (ref 0–0.7)
EOSINOPHIL NFR BLD AUTO: 1.2 %
ERYTHROCYTE [DISTWIDTH] IN BLOOD BY AUTOMATED COUNT: 16.2 % (ref 11.5–14.5)
GLUCOSE BLD MANUAL STRIP-MCNC: 117 MG/DL (ref 74–99)
GLUCOSE BLD MANUAL STRIP-MCNC: 130 MG/DL (ref 74–99)
GLUCOSE BLD MANUAL STRIP-MCNC: 142 MG/DL (ref 74–99)
GLUCOSE BLD MANUAL STRIP-MCNC: 163 MG/DL (ref 74–99)
GLUCOSE SERPL-MCNC: 109 MG/DL (ref 74–99)
HCT VFR BLD AUTO: 25.4 % (ref 36–46)
HGB BLD-MCNC: 8.1 G/DL (ref 12–16)
IMM GRANULOCYTES # BLD AUTO: 0.04 X10*3/UL (ref 0–0.7)
IMM GRANULOCYTES NFR BLD AUTO: 0.6 % (ref 0–0.9)
LYMPHOCYTES # BLD AUTO: 0.87 X10*3/UL (ref 1.2–4.8)
LYMPHOCYTES NFR BLD AUTO: 13.1 %
MCH RBC QN AUTO: 29.2 PG (ref 26–34)
MCHC RBC AUTO-ENTMCNC: 31.9 G/DL (ref 32–36)
MCV RBC AUTO: 92 FL (ref 80–100)
MONOCYTES # BLD AUTO: 0.87 X10*3/UL (ref 0.1–1)
MONOCYTES NFR BLD AUTO: 13.1 %
NEUTROPHILS # BLD AUTO: 4.74 X10*3/UL (ref 1.2–7.7)
NEUTROPHILS NFR BLD AUTO: 71.4 %
NRBC BLD-RTO: 0 /100 WBCS (ref 0–0)
PLATELET # BLD AUTO: 295 X10*3/UL (ref 150–450)
POTASSIUM SERPL-SCNC: 4.1 MMOL/L (ref 3.5–5.3)
PROT SERPL-MCNC: 5.8 G/DL (ref 6.4–8.2)
RBC # BLD AUTO: 2.77 X10*6/UL (ref 4–5.2)
SODIUM SERPL-SCNC: 139 MMOL/L (ref 136–145)
VANCOMYCIN TROUGH SERPL-MCNC: 15.8 UG/ML (ref 5–20)
WBC # BLD AUTO: 6.6 X10*3/UL (ref 4.4–11.3)

## 2025-05-16 PROCEDURE — 80202 ASSAY OF VANCOMYCIN: CPT | Performed by: INTERNAL MEDICINE

## 2025-05-16 PROCEDURE — 80053 COMPREHEN METABOLIC PANEL: CPT | Performed by: INTERNAL MEDICINE

## 2025-05-16 PROCEDURE — 2500000001 HC RX 250 WO HCPCS SELF ADMINISTERED DRUGS (ALT 637 FOR MEDICARE OP): Performed by: INTERNAL MEDICINE

## 2025-05-16 PROCEDURE — 82947 ASSAY GLUCOSE BLOOD QUANT: CPT

## 2025-05-16 PROCEDURE — 85025 COMPLETE CBC W/AUTO DIFF WBC: CPT | Performed by: INTERNAL MEDICINE

## 2025-05-16 PROCEDURE — 2500000005 HC RX 250 GENERAL PHARMACY W/O HCPCS: Performed by: INTERNAL MEDICINE

## 2025-05-16 PROCEDURE — 1100000001 HC PRIVATE ROOM DAILY

## 2025-05-16 PROCEDURE — 2500000004 HC RX 250 GENERAL PHARMACY W/ HCPCS (ALT 636 FOR OP/ED): Mod: JZ | Performed by: PHYSICIAN ASSISTANT

## 2025-05-16 RX ADMIN — DOCUSATE SODIUM 100 MG: 100 CAPSULE, LIQUID FILLED ORAL at 08:49

## 2025-05-16 RX ADMIN — FAMOTIDINE 10 MG: 20 TABLET, FILM COATED ORAL at 08:49

## 2025-05-16 RX ADMIN — CARVEDILOL 12.5 MG: 12.5 TABLET, FILM COATED ORAL at 08:49

## 2025-05-16 RX ADMIN — CALCIUM CARBONATE (ANTACID) CHEW TAB 500 MG 1 TABLET: 500 CHEW TAB at 20:25

## 2025-05-16 RX ADMIN — CARVEDILOL 12.5 MG: 12.5 TABLET, FILM COATED ORAL at 20:25

## 2025-05-16 RX ADMIN — ONDANSETRON 4 MG: 2 INJECTION INTRAMUSCULAR; INTRAVENOUS at 17:23

## 2025-05-16 RX ADMIN — ASPIRIN 81 MG: 81 TABLET, COATED ORAL at 08:49

## 2025-05-16 RX ADMIN — Medication 3 MG: at 20:25

## 2025-05-16 ASSESSMENT — PAIN SCALES - GENERAL
PAINLEVEL_OUTOF10: 0 - NO PAIN
PAINLEVEL_OUTOF10: 0 - NO PAIN

## 2025-05-16 NOTE — CONSULTS
"Nutrition Initial Assessment:   Nutrition Assessment    Reason for Assessment: Length of stay    Patient is a 49 y.o. female presenting with ALEIDA.       Nutrition History:  Energy Intake: Fair 50-75 %  Pain affecting nutrition status: N/A  Food and Nutrient History: Pt reports she had a decreased appetite at the beginning of her admission but it has gotten a little better. States she is eating 50% or less of her meals depending on what she gets. Has two documented meal intakes this admission of 66% x 1 and 75% x 1. States she usually drinks ONS at home/prior facility (1-2x/day). Does not like magic cup supplement ordered here - will discontinue and order ensure instead. Pt reports nausea that she is getting medication for. Also reports diarrhea that has been ongoing since mid April. 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  Vitamin/Herbal Supplement Use: none listed in home med list       Anthropometrics:  Height: 144.8 cm (4' 9\")   Weight: 54.4 kg (120 lb)   BMI (Calculated): 25.96  IBW/kg (Dietitian Calculated): 38.6 kg                         Weight History:   Wt Readings from Last 10 Encounters:   05/07/25 54.4 kg (120 lb)   10/30/24 72.4 kg (159 lb 9.6 oz)   10/28/24 84.8 kg (187 lb)      Weight Change %:  Weight History / % Weight Change: Pt reports weighing 191# at one time but does not know how long ago. States she has lost a lot of weight and is now down to 120#. Based on available wt records in EMR, pt with 18 kg (24.8%) wt loss x 7 months.  Significant Weight Loss: Yes  Interpretation of Weight Loss: >20% in 1 year    Nutrition Focused Physical Exam Findings:    Subcutaneous Fat Loss:   Orbital Fat Pads: Well nourished (slightly bulging fat pads)  Buccal Fat Pads: Well nourished (full, rounded cheeks)  Triceps: Well nourished (ample fat tissue)  Muscle Wasting:  Temporalis: Well nourished (well-defined muscle)  Pectoralis (Clavicular Region): " This encounter was created in error - please disregard.   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)    Nutrition Significant Labs:    Reviewed   Nutrition Specific Medications:  Reviewed     I/O:   Last BM Date: 05/15/25; Stool Appearance: Loose (05/16/25 1341)    Dietary Orders (From admission, onward)       Start     Ordered    05/16/25 1426  Oral nutritional supplements  Until discontinued        Comments: chocolate   Question Answer Comment   Deliver with Breakfast    Deliver with Dinner    Select supplement: Ensure Plus High Protein        05/16/25 1426    05/10/25 1244  Adult diet Regular; Thin 0  Diet effective now        Question Answer Comment   Diet type Regular    Fluid consistency Thin 0        05/10/25 1243    05/08/25 0134  May Participate in Room Service  ( ROOM SERVICE MAY PARTICIPATE)  Once        Question:  .  Answer:  Yes    05/08/25 0133                     Estimated Needs:      Method for Estimating Needs: 9014-0597 kcal/d (25-30 kcal/kg ABW)     Method for Estimating 24 Hour Protein Needs: 44-54 g/d (0.8-1 g/kg ABW)     Method for Estimating 24 Hour Fluid Needs: 1323-9769 ml/d (1 ml/kcal or per MD)  Patient on Order Fluid Restriction: No        Nutrition Diagnosis   Malnutrition Diagnosis  Patient has Malnutrition Diagnosis:  (unable to determine at this time)    Nutrition Diagnosis  Patient has Nutrition Diagnosis: Yes  Diagnosis Status (1): New  Nutrition Diagnosis 1: Unintended weight loss  Related to (1): suspected prolonged inadequate PO intake  As Evidenced by (1): 24.8% wt loss x 7 months       Nutrition Interventions/Recommendations   Nutrition prescription  for oral nutrition    Nutrition Recommendations:  Individualized Nutrition Prescription Provided for : Regular diet with ensure plus high protein BID    Nutrition Interventions/Goals:   Meals and Snacks: General healthful diet  Goal: Consumes 3 meals per day  Medical Food Supplement: Commercial beverage medical food supplement therapy  Goal: Ensure Plus High Protein TID chocolate (provides 350 kcal, 20 g protein per serving).      Education Documentation  No documentation found.     Patient with no diet related questions at this time.         Nutrition Monitoring and Evaluation   Food/Nutrient Related History Monitoring  Monitoring and Evaluation Plan: Intake / amount of food, Estimated Energy Intake  Estimated Energy Intake: Energy intake greater or equal to 75% of estimated energy needs  Intake / Amount of food: Consumes at least 75% or more of meals/snacks/supplements, Meets > 75% estimated energy needs    Anthropometric Measurements  Monitoring and Evaluation Plan: Body weight  Body Weight: Body weight - Maintain stable weight         Physical Exam Findings  Monitoring and Evaluation Plan: Digestive System  Digestive System Finding: Nausea, Diarrhea  Criteria: Improvement in GI function/symptoms    Goal Status: New goal(s) identified    Time Spent (min): 60 minutes

## 2025-05-16 NOTE — PROGRESS NOTES
Tenzin Cherry is a 49 y.o. female on day 8 of admission presenting with ALEIDA (acute kidney injury).      Subjective   No events overnight. Patient seen and examined at bedside. She has no complaints.        Objective     Last Recorded Vitals  /86 (BP Location: Left arm, Patient Position: Lying)   Pulse 71   Temp 36.2 °C (97.2 °F) (Temporal)   Resp 18   Wt 54.4 kg (120 lb)   SpO2 100%   Intake/Output last 3 Shifts:    Intake/Output Summary (Last 24 hours) at 5/15/2025 2047  Last data filed at 5/15/2025 1935  Gross per 24 hour   Intake 1495 ml   Output 1200 ml   Net 295 ml       Admission Weight  Weight: 54.4 kg (120 lb) (05/07/25 1903)    Daily Weight  05/07/25 : 54.4 kg (120 lb)    Image Results  Electrocardiogram, 12-lead PRN ACS symptoms  Normal sinus rhythm  ST & T wave abnormality, consider anterior ischemia  Abnormal ECG    Confirmed by Sam Christina ([id number]) on 5/14/2025 3:14:06 PM      Physical Exam  Constitutional:       Appearance: Normal appearance.   HENT:      Mouth/Throat:      Mouth: Mucous membranes are dry.      Pharynx: Oropharynx is clear.   Eyes:      Pupils: Pupils are equal, round, and reactive to light.   Cardiovascular:      Rate and Rhythm: Normal rate and regular rhythm.   Pulmonary:      Effort: Pulmonary effort is normal.      Breath sounds: Rhonchi present.   Abdominal:      General: Bowel sounds are normal.      Palpations: Abdomen is soft.   Musculoskeletal:      Cervical back: Normal range of motion.      Left hip: Tenderness present.   Skin:     General: Skin is warm and dry.      Capillary Refill: Capillary refill takes less than 2 seconds.   Neurological:      Mental Status: She is alert and oriented to person, place, and time. Mental status is at baseline.   Psychiatric:         Mood and Affect: Mood normal.      Relevant Results                    This patient has a central line   Reason for the central line remaining today? Parenteral  nutrition            Assessment & Plan  ALEIDA (acute kidney injury)    Hypokalemia    Anemia    Yesimar is a 49-year-old female with past medical history of MRDD, recent left hip replacement with postop infection currently receiving IV antibiotics at her skilled nursing facility presenting to emergency department for an elevated vancomycin trough and abnormal labs.  Patient was signed out for evaluation of an abnormal creatinine and elevated vancomycin trough.  Patient denies any pain or distress.  Patient is alert and oriented x 3.  Patient was found to have a glucose of 128, potassium 3.1, creatinine 3.24, GFR 17, hemoglobin 9.6, hematocrit 28.8.  Patient is currently receiving vancomycin 1 g every 24 hours and Zosyn 3.375 g every 6 hours for a postop left hip infection.  Patient received 1 L of normal saline in ED, medicated with Tylenol p.o. and KCl 40 Meq p.o.  Admitted for further medical management.     ALEIDA/hypokalemia/anemia  Inpatient admission to Dr. Toni Locke  NS x 1 L in ED/continue LR at 75 an hour  Hold Vancomycin at this time   K+ 3.1  KCl 40 mEq p.o.  Repeat labs in a.m.  Hgb 9.6  Monitor for bleeding  Trend hemoglobin  History of cancer  Continue renal diet with thin liquids     HTN/HLD/DM2/history of uterine cancer and laryngeal mass/MRDD/left hip replacement s/p postop wound infection  #Chronic conditions  Labs in a.m.  Vancomycin trough in a.m.  Tylenol as needed  Nursing to complete home med rec  Hold home metformin  Insulin sliding scale  Hypoglycemia protocol  PT/OT     DVT Ppx  SCDs  No indication for chemical prophylaxis  Out of bed with assistance     5/9: Creatinine improving. Continue IVF. Repeat labs tomorrow.    5/12: Creatinine has plateaued at 2.7. Renal artery ultrasound pending. Management per Nephrology. Discharge in 24-48 hours, suspect Vanc nephropathy that could take weeks to resolve.      5/13: Creatinine is 2.8 today, was 2.7 yesterday. Will resume IVF. Renal artery  ultrasound unremarkable. Dispo per Nephrology.     5/14: Patient to resume Vancomycin tomorrow. Appeal for SNF once antibiotic course clarified. Creatinine improved to 2.7, continue IVF.    5/15: Patient started back on Vancomycin. Awaiting appeal for SNF.         Toni Locke, DO

## 2025-05-16 NOTE — PROGRESS NOTES
Vancomycin Dosing by Pharmacy- FOLLOW UP    Tenzin Cherry is a 49 y.o. year old female who Pharmacy has been consulted for vancomycin dosing for cellulitis, skin and soft tissue. Based on the patient's indication and renal status this patient is being dosed based on a goal trough/random level of 10-15.     Renal function is currently stable.    Last vancomycin dose: 500 mg given for one dose on 5/15/25 at 1100    Most recent trough level: 15.8 mcg/mL    Visit Vitals  /79 (BP Location: Left arm, Patient Position: Lying)   Pulse 69   Temp 36.9 °C (98.4 °F) (Temporal)   Resp 18        Lab Results   Component Value Date    CREATININE 2.90 (H) 2025    CREATININE 3.00 (H) 05/15/2025    CREATININE 2.75 (H) 2025    CREATININE 2.89 (H) 2025        Patient weight is as follows:   Vitals:    25 1903   Weight: 54.4 kg (120 lb)       Cultures:  No results found for the encounter in last 14 days.       I/O last 3 completed shifts:  In: 1495 (27.5 mL/kg) [I.V.:1495 (27.5 mL/kg)]  Out: 1550 (28.5 mL/kg) [Urine:1550 (0.8 mL/kg/hr)]  Dosing Weight: 54.4 kg   I/O during current shift:  No intake/output data recorded.    Temp (24hrs), Av.4 °C (97.5 °F), Min:36 °C (96.8 °F), Max:36.9 °C (98.4 °F)      Assessment/Plan    Resulting trough of 15.8mcg/mL is slightly supra-therapeutic for goal range of 10-15mcg/mL for indication of cellulitis, skin and soft tissue infection. Renal function remained stable overnight. However, due to patient's severely supra-therapeutic level on admission, and subsequent rises in Scr, will be cautious and HOLD vancomycin today (anticipate patient will require vancomycin every 24-48 hours). Note that stop date of vancomycin therapy was 25, plan to continue dosing until that time.   The next level will be obtained on 25 with morning labs. May be obtained sooner if clinically indicated.   Will continue to monitor renal function daily while on vancomycin and  order serum creatinine at least every 48 hours if not already ordered.  Follow for continued vancomycin needs, clinical response, and signs/symptoms of toxicity.     Pamela Haas, PharmD, BCPS   5/16/2025 11:07 AM

## 2025-05-16 NOTE — CARE PLAN
The patient's goals for the shift include rest.    The clinical goals for the shift include safety, comfort, and pain control.    Pain - Adult  Add All  Verbalizes/displays adequate comfort level or baseline comfort level  Add  Today at 0118 - Progressing by Chucky Terrazas, RN  Add  Safety - Adult  Add All  Free from fall injury  Add  Today at 0118 - Progressing by Chucky Terrazas, RN  Add  Discharge Planning  Add All  Discharge to home or other facility with appropriate resources  Add  Today at 0118 - Progressing by Chucky Terrazas, RN  Add  Chronic Conditions and Co-morbidities  Add All  Patient's chronic conditions and co-morbidity symptoms are monitored and maintained or improved  Add  Today at 0118 - Progressing by Chucky Terrazas, CHAY  Add  Nutrition  Add All  Nutrient intake appropriate for maintaining nutritional needs  Add  Today at 0118 - Progressing by Chucky Terrazas, CHAY  Add  Fall/Injury  Add All  Not fall by end of shift  Add  Today at 0118 - Progressing by Chucky Terrazas, CHAY  Add  Be free from injury by end of the shift  Add  Today at 0118 - Progressing by Chucky Terrazas, RN  Add  Verbalize understanding of personal risk factors for fall in the hospital  Add  Today at 0118 - Progressing by Chucky Terrazas, RN  Add  Verbalize understanding of risk factor reduction measures to prevent injury from fall in the home  Add  Today at 0118 - Progressing by Chucky Terrazas, CHAY  Add  Use assistive devices by end of the shift  Add  Today at 0118 - Progressing by Chucky Terrazas, CHAY  Add  Pace activities to prevent fatigue by end of the shift  Add  Today at 0118 - Progressing by Chucky Terrazas, RN  Add  PICC line  Add All  I will remain free from symptoms of infection  Add  Today at 0118 - Progressing by Chucky Terrazas, CHAY  Add  Skin  Add All  Participates in plan/prevention/treatment measures  Add  Today at 0118 - Progressing by Chucky Terrazas RN  Add  Flowsheets  Taken today at  0118  Participates in plan/prevention/treatment measures  Increase activity/out of bed for meals  Prevent/manage excess moisture  Add  Today at 0118 - Progressing by Chucky Terrazas RN  Add  Flowsheets  Taken today at 0118  Prevent/manage excess moisture  Monitor for/manage infection if present  Prevent/minimize sheer/friction injuries  Add  Today at 0118 - Progressing by Chucky Terrazas RN  Add  Flowsheets  Taken today at 0118  Prevent/minimize sheer/friction injuries  HOB 30 degrees or less  Promote/optimize nutrition  Add  Today at 0118 - Progressing by Chucky Terrazas RN  Add  Flowsheets  Taken today at 0118  Promote/optimize nutrition  Offer water/supplements/favorite foods  Promote skin healing  Add  Today at 0118 - Progressing by Chucky Terrazas RN  Add  Flowsheets  Taken today at 0118  Promote skin healing  Assess skin/pad under line(s)/device(s)

## 2025-05-16 NOTE — PROGRESS NOTES
Subjective   No  Voice Complaints  Renal chemistries improving serum creatinine today is 2.9.  Blood pressure is controlled on carvedilol  Objective          Vitals 24HR  Heart Rate:  [66-72]   Temp:  [36 °C (96.8 °F)-36.9 °C (98.4 °F)]   Resp:  [16-18]   BP: (121-140)/(75-86)   SpO2:  [95 %-100 %]         Intake/Output last 3 Shifts:    Intake/Output Summary (Last 24 hours) at 5/16/2025 1101  Last data filed at 5/16/2025 0625  Gross per 24 hour   Intake 1495 ml   Output 1000 ml   Net 495 ml                                                                                                                                                                   Physical Exam  General appearance; alert oriented  HEENT; there is no icterus pupils react to light accommodation  Neck; no JVD no bruit no thyromegaly no adenopathy  Lungs; clear to auscultation and percussion  Heart; regular rate no gallop no rubs no thrills   Abdomen ;active peristalsis, no guarding no rubs  Extremities; no edema  Neurological; no clonus no tremors no asterixis  Relevant Results  Results for orders placed or performed during the hospital encounter of 05/07/25 (from the past 24 hours)   POCT GLUCOSE   Result Value Ref Range    POCT Glucose 134 (H) 74 - 99 mg/dL   POCT GLUCOSE   Result Value Ref Range    POCT Glucose 183 (H) 74 - 99 mg/dL   POCT GLUCOSE   Result Value Ref Range    POCT Glucose 133 (H) 74 - 99 mg/dL   CBC and Auto Differential   Result Value Ref Range    WBC 6.6 4.4 - 11.3 x10*3/uL    nRBC 0.0 0.0 - 0.0 /100 WBCs    RBC 2.77 (L) 4.00 - 5.20 x10*6/uL    Hemoglobin 8.1 (L) 12.0 - 16.0 g/dL    Hematocrit 25.4 (L) 36.0 - 46.0 %    MCV 92 80 - 100 fL    MCH 29.2 26.0 - 34.0 pg    MCHC 31.9 (L) 32.0 - 36.0 g/dL    RDW 16.2 (H) 11.5 - 14.5 %    Platelets 295 150 - 450 x10*3/uL    Neutrophils % 71.4 40.0 - 80.0 %    Immature Granulocytes %, Automated 0.6 0.0 - 0.9 %    Lymphocytes % 13.1 13.0 - 44.0 %    Monocytes % 13.1 2.0 - 10.0 %     Eosinophils % 1.2 0.0 - 6.0 %    Basophils % 0.6 0.0 - 2.0 %    Neutrophils Absolute 4.74 1.20 - 7.70 x10*3/uL    Immature Granulocytes Absolute, Automated 0.04 0.00 - 0.70 x10*3/uL    Lymphocytes Absolute 0.87 (L) 1.20 - 4.80 x10*3/uL    Monocytes Absolute 0.87 0.10 - 1.00 x10*3/uL    Eosinophils Absolute 0.08 0.00 - 0.70 x10*3/uL    Basophils Absolute 0.04 0.00 - 0.10 x10*3/uL   Comprehensive Metabolic Panel   Result Value Ref Range    Glucose 109 (H) 74 - 99 mg/dL    Sodium 139 136 - 145 mmol/L    Potassium 4.1 3.5 - 5.3 mmol/L    Chloride 105 98 - 107 mmol/L    Bicarbonate 26 21 - 32 mmol/L    Anion Gap 12 10 - 20 mmol/L    Urea Nitrogen 21 6 - 23 mg/dL    Creatinine 2.90 (H) 0.50 - 1.05 mg/dL    eGFR 20 (L) >60 mL/min/1.73m*2    Calcium 9.0 8.6 - 10.3 mg/dL    Albumin 3.2 (L) 3.4 - 5.0 g/dL    Alkaline Phosphatase 152 (H) 33 - 110 U/L    Total Protein 5.8 (L) 6.4 - 8.2 g/dL    AST 14 9 - 39 U/L    Bilirubin, Total 0.5 0.0 - 1.2 mg/dL    ALT 13 7 - 45 U/L   POCT GLUCOSE   Result Value Ref Range    POCT Glucose 117 (H) 74 - 99 mg/dL   Vancomycin, Trough   Result Value Ref Range    Vancomycin, Trough 15.8 5.0 - 20.0 ug/mL                       Assessment & Plan  ALEIDA (acute kidney injury)  Acute interstitial nephritis  Anemia of chronic disease  History of idiopathic thrombocytopenic purpura  Hypertension  Overt proteinuria  Hypokalemia    Anemia  Plan  Continue carvedilol  monitor renal chemistries  Continue current therapy currently on IV vancomycin    I spent 20 minutes in the professional and overall care of this patient.      Arvin Sharma MD

## 2025-05-17 ENCOUNTER — APPOINTMENT (OUTPATIENT)
Dept: RADIOLOGY | Facility: HOSPITAL | Age: 50
End: 2025-05-17
Payer: MEDICARE

## 2025-05-17 LAB
ALBUMIN SERPL BCP-MCNC: 3.3 G/DL (ref 3.4–5)
ALP SERPL-CCNC: 155 U/L (ref 33–110)
ALT SERPL W P-5'-P-CCNC: 12 U/L (ref 7–45)
ANION GAP SERPL CALC-SCNC: 16 MMOL/L (ref 10–20)
AST SERPL W P-5'-P-CCNC: 14 U/L (ref 9–39)
BASOPHILS # BLD AUTO: 0.04 X10*3/UL (ref 0–0.1)
BASOPHILS NFR BLD AUTO: 0.6 %
BILIRUB SERPL-MCNC: 0.4 MG/DL (ref 0–1.2)
BUN SERPL-MCNC: 23 MG/DL (ref 6–23)
CALCIUM SERPL-MCNC: 9.3 MG/DL (ref 8.6–10.3)
CHLORIDE SERPL-SCNC: 104 MMOL/L (ref 98–107)
CO2 SERPL-SCNC: 26 MMOL/L (ref 21–32)
CREAT SERPL-MCNC: 2.92 MG/DL (ref 0.5–1.05)
EGFRCR SERPLBLD CKD-EPI 2021: 19 ML/MIN/1.73M*2
EOSINOPHIL # BLD AUTO: 0.13 X10*3/UL (ref 0–0.7)
EOSINOPHIL NFR BLD AUTO: 2 %
ERYTHROCYTE [DISTWIDTH] IN BLOOD BY AUTOMATED COUNT: 16.2 % (ref 11.5–14.5)
GLUCOSE BLD MANUAL STRIP-MCNC: 102 MG/DL (ref 74–99)
GLUCOSE BLD MANUAL STRIP-MCNC: 112 MG/DL (ref 74–99)
GLUCOSE BLD MANUAL STRIP-MCNC: 147 MG/DL (ref 74–99)
GLUCOSE BLD MANUAL STRIP-MCNC: 199 MG/DL (ref 74–99)
GLUCOSE BLD MANUAL STRIP-MCNC: 201 MG/DL (ref 74–99)
GLUCOSE SERPL-MCNC: 103 MG/DL (ref 74–99)
HCT VFR BLD AUTO: 25.8 % (ref 36–46)
HGB BLD-MCNC: 8.3 G/DL (ref 12–16)
IMM GRANULOCYTES # BLD AUTO: 0.04 X10*3/UL (ref 0–0.7)
IMM GRANULOCYTES NFR BLD AUTO: 0.6 % (ref 0–0.9)
LYMPHOCYTES # BLD AUTO: 0.96 X10*3/UL (ref 1.2–4.8)
LYMPHOCYTES NFR BLD AUTO: 14.8 %
MCH RBC QN AUTO: 29.7 PG (ref 26–34)
MCHC RBC AUTO-ENTMCNC: 32.2 G/DL (ref 32–36)
MCV RBC AUTO: 93 FL (ref 80–100)
MONOCYTES # BLD AUTO: 0.77 X10*3/UL (ref 0.1–1)
MONOCYTES NFR BLD AUTO: 11.9 %
NEUTROPHILS # BLD AUTO: 4.53 X10*3/UL (ref 1.2–7.7)
NEUTROPHILS NFR BLD AUTO: 70.1 %
NRBC BLD-RTO: 0 /100 WBCS (ref 0–0)
PLATELET # BLD AUTO: 352 X10*3/UL (ref 150–450)
POTASSIUM SERPL-SCNC: 4.2 MMOL/L (ref 3.5–5.3)
PROT SERPL-MCNC: 6 G/DL (ref 6.4–8.2)
RBC # BLD AUTO: 2.79 X10*6/UL (ref 4–5.2)
SODIUM SERPL-SCNC: 142 MMOL/L (ref 136–145)
VANCOMYCIN SERPL-MCNC: 12.2 UG/ML (ref 5–20)
WBC # BLD AUTO: 6.5 X10*3/UL (ref 4.4–11.3)

## 2025-05-17 PROCEDURE — 2500000001 HC RX 250 WO HCPCS SELF ADMINISTERED DRUGS (ALT 637 FOR MEDICARE OP): Performed by: INTERNAL MEDICINE

## 2025-05-17 PROCEDURE — 80053 COMPREHEN METABOLIC PANEL: CPT | Performed by: INTERNAL MEDICINE

## 2025-05-17 PROCEDURE — 2500000002 HC RX 250 W HCPCS SELF ADMINISTERED DRUGS (ALT 637 FOR MEDICARE OP, ALT 636 FOR OP/ED): Performed by: NURSE PRACTITIONER

## 2025-05-17 PROCEDURE — 80202 ASSAY OF VANCOMYCIN: CPT | Performed by: INTERNAL MEDICINE

## 2025-05-17 PROCEDURE — 1100000001 HC PRIVATE ROOM DAILY

## 2025-05-17 PROCEDURE — 85025 COMPLETE CBC W/AUTO DIFF WBC: CPT | Performed by: INTERNAL MEDICINE

## 2025-05-17 PROCEDURE — 76770 US EXAM ABDO BACK WALL COMP: CPT

## 2025-05-17 PROCEDURE — 2500000005 HC RX 250 GENERAL PHARMACY W/O HCPCS: Performed by: INTERNAL MEDICINE

## 2025-05-17 PROCEDURE — 2500000004 HC RX 250 GENERAL PHARMACY W/ HCPCS (ALT 636 FOR OP/ED): Mod: JZ | Performed by: PHYSICIAN ASSISTANT

## 2025-05-17 PROCEDURE — 76770 US EXAM ABDO BACK WALL COMP: CPT | Performed by: RADIOLOGY

## 2025-05-17 PROCEDURE — 82947 ASSAY GLUCOSE BLOOD QUANT: CPT

## 2025-05-17 PROCEDURE — 2500000004 HC RX 250 GENERAL PHARMACY W/ HCPCS (ALT 636 FOR OP/ED): Mod: JZ

## 2025-05-17 RX ORDER — VANCOMYCIN HYDROCHLORIDE 500 MG/100ML
500 INJECTION, SOLUTION INTRAVENOUS ONCE
Status: COMPLETED | OUTPATIENT
Start: 2025-05-17 | End: 2025-05-17

## 2025-05-17 RX ADMIN — FAMOTIDINE 10 MG: 20 TABLET, FILM COATED ORAL at 09:27

## 2025-05-17 RX ADMIN — CARVEDILOL 12.5 MG: 12.5 TABLET, FILM COATED ORAL at 21:45

## 2025-05-17 RX ADMIN — Medication 3 MG: at 21:46

## 2025-05-17 RX ADMIN — DOCUSATE SODIUM 100 MG: 100 CAPSULE, LIQUID FILLED ORAL at 09:27

## 2025-05-17 RX ADMIN — CARVEDILOL 12.5 MG: 12.5 TABLET, FILM COATED ORAL at 09:27

## 2025-05-17 RX ADMIN — VANCOMYCIN HYDROCHLORIDE 500 MG: 500 INJECTION, SOLUTION INTRAVENOUS at 11:38

## 2025-05-17 RX ADMIN — ASPIRIN 81 MG: 81 TABLET, COATED ORAL at 09:27

## 2025-05-17 RX ADMIN — INSULIN LISPRO 4 UNITS: 100 INJECTION, SOLUTION INTRAVENOUS; SUBCUTANEOUS at 12:55

## 2025-05-17 RX ADMIN — ONDANSETRON 4 MG: 2 INJECTION INTRAMUSCULAR; INTRAVENOUS at 07:39

## 2025-05-17 ASSESSMENT — PAIN - FUNCTIONAL ASSESSMENT: PAIN_FUNCTIONAL_ASSESSMENT: 0-10

## 2025-05-17 ASSESSMENT — PAIN SCALES - GENERAL
PAINLEVEL_OUTOF10: 0 - NO PAIN
PAINLEVEL_OUTOF10: 0 - NO PAIN

## 2025-05-17 NOTE — CARE PLAN
Problem: Pain - Adult  Goal: Verbalizes/displays adequate comfort level or baseline comfort level  Outcome: Progressing     Problem: Safety - Adult  Goal: Free from fall injury  Outcome: Progressing     Problem: Discharge Planning  Goal: Discharge to home or other facility with appropriate resources  Outcome: Progressing     Problem: Chronic Conditions and Co-morbidities  Goal: Patient's chronic conditions and co-morbidity symptoms are monitored and maintained or improved  Outcome: Progressing     Problem: Nutrition  Goal: Nutrient intake appropriate for maintaining nutritional needs  Outcome: Progressing     Problem: Fall/Injury  Goal: Not fall by end of shift  Outcome: Progressing  Goal: Be free from injury by end of the shift  Outcome: Progressing  Goal: Verbalize understanding of personal risk factors for fall in the hospital  Outcome: Progressing  Goal: Verbalize understanding of risk factor reduction measures to prevent injury from fall in the home  Outcome: Progressing  Goal: Use assistive devices by end of the shift  Outcome: Progressing  Goal: Pace activities to prevent fatigue by end of the shift  Outcome: Progressing     Problem: PICC line  Goal: I will remain free from symptoms of infection  Outcome: Progressing     Problem: Skin  Goal: Participates in plan/prevention/treatment measures  Outcome: Progressing  Goal: Prevent/manage excess moisture  Outcome: Progressing  Goal: Prevent/minimize sheer/friction injuries  Outcome: Progressing  Goal: Promote/optimize nutrition  Outcome: Progressing  Goal: Promote skin healing  Outcome: Progressing

## 2025-05-17 NOTE — ASSESSMENT & PLAN NOTE
Acute interstitial nephritis  Acute kidney injury  Anemia of renal disease  Hypertension  Overt proteinuria

## 2025-05-17 NOTE — PROGRESS NOTES
Tenzin Cherry is a 49 y.o. female on day 9 of admission presenting with ALEIDA (acute kidney injury).      Subjective   No events overnight. Patient seen and examined at bedside. She has no complaints.        Objective     Last Recorded Vitals  /72   Pulse 76   Temp 36.7 °C (98.1 °F)   Resp 18   Wt 54.4 kg (120 lb)   SpO2 98%   Intake/Output last 3 Shifts:    Intake/Output Summary (Last 24 hours) at 5/16/2025 2146  Last data filed at 5/16/2025 0625  Gross per 24 hour   Intake --   Output 350 ml   Net -350 ml       Admission Weight  Weight: 54.4 kg (120 lb) (05/07/25 1903)    Daily Weight  05/07/25 : 54.4 kg (120 lb)    Image Results  Electrocardiogram, 12-lead PRN ACS symptoms  Normal sinus rhythm  ST & T wave abnormality, consider anterior ischemia  Abnormal ECG    Confirmed by Sam Christina ([id number]) on 5/14/2025 3:14:06 PM      Physical Exam  Constitutional:       Appearance: Normal appearance.   HENT:      Mouth/Throat:      Mouth: Mucous membranes are dry.      Pharynx: Oropharynx is clear.   Eyes:      Pupils: Pupils are equal, round, and reactive to light.   Cardiovascular:      Rate and Rhythm: Normal rate and regular rhythm.   Pulmonary:      Effort: Pulmonary effort is normal.      Breath sounds: Rhonchi present.   Abdominal:      General: Bowel sounds are normal.      Palpations: Abdomen is soft.   Musculoskeletal:      Cervical back: Normal range of motion.      Left hip: Tenderness present.   Skin:     General: Skin is warm and dry.      Capillary Refill: Capillary refill takes less than 2 seconds.   Neurological:      Mental Status: She is alert and oriented to person, place, and time. Mental status is at baseline.   Psychiatric:         Mood and Affect: Mood normal.      Relevant Results                    This patient has a central line   Reason for the central line remaining today? Parenteral nutrition            Assessment & Plan  ALEIDA (acute kidney  injury)    Hypokalemia    Anemia    Yesimar is a 49-year-old female with past medical history of MRDD, recent left hip replacement with postop infection currently receiving IV antibiotics at her skilled nursing facility presenting to emergency department for an elevated vancomycin trough and abnormal labs.  Patient was signed out for evaluation of an abnormal creatinine and elevated vancomycin trough.  Patient denies any pain or distress.  Patient is alert and oriented x 3.  Patient was found to have a glucose of 128, potassium 3.1, creatinine 3.24, GFR 17, hemoglobin 9.6, hematocrit 28.8.  Patient is currently receiving vancomycin 1 g every 24 hours and Zosyn 3.375 g every 6 hours for a postop left hip infection.  Patient received 1 L of normal saline in ED, medicated with Tylenol p.o. and KCl 40 Meq p.o.  Admitted for further medical management.     ALEIDA/hypokalemia/anemia  Inpatient admission to Dr. Toni Locke  NS x 1 L in ED/continue LR at 75 an hour  Hold Vancomycin at this time   K+ 3.1  KCl 40 mEq p.o.  Repeat labs in a.m.  Hgb 9.6  Monitor for bleeding  Trend hemoglobin  History of cancer  Continue renal diet with thin liquids     HTN/HLD/DM2/history of uterine cancer and laryngeal mass/MRDD/left hip replacement s/p postop wound infection  #Chronic conditions  Labs in a.m.  Vancomycin trough in a.m.  Tylenol as needed  Nursing to complete home med rec  Hold home metformin  Insulin sliding scale  Hypoglycemia protocol  PT/OT     DVT Ppx  SCDs  No indication for chemical prophylaxis  Out of bed with assistance     5/9: Creatinine improving. Continue IVF. Repeat labs tomorrow.    5/12: Creatinine has plateaued at 2.7. Renal artery ultrasound pending. Management per Nephrology. Discharge in 24-48 hours, suspect Vanc nephropathy that could take weeks to resolve.      5/13: Creatinine is 2.8 today, was 2.7 yesterday. Will resume IVF. Renal artery ultrasound unremarkable. Dispo per Nephrology.     5/14:  Patient to resume Vancomycin tomorrow. Appeal for SNF once antibiotic course clarified. Creatinine improved to 2.7, continue IVF.    5/15: Patient started back on Vancomycin. Awaiting appeal for SNF.    5/16: Creatinine improved from 3.0 down to 2.9. Continue IV Vanc. Appeal for SNF pending.           Toni Locke, DO

## 2025-05-17 NOTE — PROGRESS NOTES
Subjective   Patient is upset about still being in the hospital.  Labs for review  Creatinine 2.92 today  Still with anemia with hemoglobin of 8.3  Objective          Vitals 24HR  Heart Rate:  [66-76]   Temp:  [35.7 °C (96.3 °F)-36.7 °C (98.1 °F)]   Resp:  [18]   BP: (119-134)/(69-83)   SpO2:  [96 %-98 %]         Intake/Output last 3 Shifts:    Intake/Output Summary (Last 24 hours) at 5/17/2025 1232  Last data filed at 5/17/2025 1204  Gross per 24 hour   Intake 100 ml   Output 200 ml   Net -100 ml                                                                                                                                                                   Physical Exam  General appearance; alert oriented  HEENT; there is no icterus pupils react to light accommodation  Neck; no JVD no bruit no thyromegaly no adenopathy  Lungs; clear to auscultation and percussion  Heart; regular rate no gallop no rubs no thrills   Abdomen ;active peristalsis, no guarding no rubs  Extremities; no edema  Neurological; no clonus no tremors no asterixis  Relevant Results  Results for orders placed or performed during the hospital encounter of 05/07/25 (from the past 24 hours)   POCT GLUCOSE   Result Value Ref Range    POCT Glucose 142 (H) 74 - 99 mg/dL   POCT GLUCOSE   Result Value Ref Range    POCT Glucose 163 (H) 74 - 99 mg/dL   CBC and Auto Differential   Result Value Ref Range    WBC 6.5 4.4 - 11.3 x10*3/uL    nRBC 0.0 0.0 - 0.0 /100 WBCs    RBC 2.79 (L) 4.00 - 5.20 x10*6/uL    Hemoglobin 8.3 (L) 12.0 - 16.0 g/dL    Hematocrit 25.8 (L) 36.0 - 46.0 %    MCV 93 80 - 100 fL    MCH 29.7 26.0 - 34.0 pg    MCHC 32.2 32.0 - 36.0 g/dL    RDW 16.2 (H) 11.5 - 14.5 %    Platelets 352 150 - 450 x10*3/uL    Neutrophils % 70.1 40.0 - 80.0 %    Immature Granulocytes %, Automated 0.6 0.0 - 0.9 %    Lymphocytes % 14.8 13.0 - 44.0 %    Monocytes % 11.9 2.0 - 10.0 %    Eosinophils % 2.0 0.0 - 6.0 %    Basophils % 0.6 0.0 - 2.0 %    Neutrophils  Absolute 4.53 1.20 - 7.70 x10*3/uL    Immature Granulocytes Absolute, Automated 0.04 0.00 - 0.70 x10*3/uL    Lymphocytes Absolute 0.96 (L) 1.20 - 4.80 x10*3/uL    Monocytes Absolute 0.77 0.10 - 1.00 x10*3/uL    Eosinophils Absolute 0.13 0.00 - 0.70 x10*3/uL    Basophils Absolute 0.04 0.00 - 0.10 x10*3/uL   Comprehensive Metabolic Panel   Result Value Ref Range    Glucose 103 (H) 74 - 99 mg/dL    Sodium 142 136 - 145 mmol/L    Potassium 4.2 3.5 - 5.3 mmol/L    Chloride 104 98 - 107 mmol/L    Bicarbonate 26 21 - 32 mmol/L    Anion Gap 16 10 - 20 mmol/L    Urea Nitrogen 23 6 - 23 mg/dL    Creatinine 2.92 (H) 0.50 - 1.05 mg/dL    eGFR 19 (L) >60 mL/min/1.73m*2    Calcium 9.3 8.6 - 10.3 mg/dL    Albumin 3.3 (L) 3.4 - 5.0 g/dL    Alkaline Phosphatase 155 (H) 33 - 110 U/L    Total Protein 6.0 (L) 6.4 - 8.2 g/dL    AST 14 9 - 39 U/L    Bilirubin, Total 0.4 0.0 - 1.2 mg/dL    ALT 12 7 - 45 U/L   Vancomycin   Result Value Ref Range    Vancomycin 12.2 5.0 - 20.0 ug/mL   POCT GLUCOSE   Result Value Ref Range    POCT Glucose 112 (H) 74 - 99 mg/dL   POCT GLUCOSE   Result Value Ref Range    POCT Glucose 199 (H) 74 - 99 mg/dL   POCT GLUCOSE   Result Value Ref Range    POCT Glucose 201 (H) 74 - 99 mg/dL                       Assessment & Plan  ALEIDA (acute kidney injury)  Acute interstitial nephritis  Acute kidney injury  Anemia of renal disease  Hypertension  Overt proteinuria  Hypokalemia    Anemia  Plan  Cystatin C with calculated GFR  Erythropoietin 20,000 units weekly  Renal ultrasound  monitor renal chemistries  Continue current therapy currently on IV vancomycin    I spent 20 minutes in the professional and overall care of this patient.      Arvin Sharma MD

## 2025-05-17 NOTE — ASSESSMENT & PLAN NOTE
Plan  Cystatin C with calculated GFR  Erythropoietin 20,000 units weekly  Renal ultrasound  monitor renal chemistries

## 2025-05-17 NOTE — CARE PLAN
The patient's goals for the shift include rest.    The clinical goals for the shift include Pt will remain safe and comfortable during the shift

## 2025-05-17 NOTE — PROGRESS NOTES
Vancomycin Dosing by Pharmacy- FOLLOW UP    Tenzin Cherry is a 49 y.o. year old female who Pharmacy has been consulted for vancomycin dosing for cellulitis, skin and soft tissue. Based on the patient's indication and renal status this patient is being dosed based on a goal trough/random level of 10-15.     Renal function is currently stable.    Current vancomycin dose: 500 mg given on 5/15/25 at 1100    Most recent random level: 12.2 mcg/mL from around 0619 on     Visit Vitals  /83   Pulse 66   Temp 36.4 °C (97.5 °F)   Resp 18        Lab Results   Component Value Date    CREATININE 2.92 (H) 2025    CREATININE 2.90 (H) 2025    CREATININE 3.00 (H) 05/15/2025    CREATININE 2.75 (H) 2025        Patient weight is as follows:   Vitals:    25 1903   Weight: 54.4 kg (120 lb)       Cultures:  No results found for the encounter in last 14 days.       I/O last 3 completed shifts:  In: - (0 mL/kg)   Out: 1200 (22.1 mL/kg) [Urine:1200 (0.6 mL/kg/hr)]  Dosing Weight: 54.4 kg   I/O during current shift:  No intake/output data recorded.    Temp (24hrs), Av.4 °C (97.5 °F), Min:35.7 °C (96.3 °F), Max:36.7 °C (98.1 °F)      Assessment/Plan    Within goal random/trough level.  Reorder 1 time dose of 500 mg to start around 1100 on 25.  Note: stop date of vancomycin therapy 25, plan to continue dosing until that time.  The next level will be obtained on  at 0500. May be obtained sooner if clinically indicated.   Will continue to monitor renal function daily while on vancomycin and order serum creatinine at least every 48 hours if not already ordered.  Follow for continued vancomycin needs, clinical response, and signs/symptoms of toxicity.       Zenaida Isaac, PharmD

## 2025-05-18 LAB
ALBUMIN SERPL BCP-MCNC: 3.3 G/DL (ref 3.4–5)
ALP SERPL-CCNC: 162 U/L (ref 33–110)
ALT SERPL W P-5'-P-CCNC: 11 U/L (ref 7–45)
ANION GAP SERPL CALC-SCNC: 14 MMOL/L (ref 10–20)
AST SERPL W P-5'-P-CCNC: 12 U/L (ref 9–39)
BASOPHILS # BLD AUTO: 0.04 X10*3/UL (ref 0–0.1)
BASOPHILS NFR BLD AUTO: 0.6 %
BILIRUB SERPL-MCNC: 0.4 MG/DL (ref 0–1.2)
BUN SERPL-MCNC: 26 MG/DL (ref 6–23)
CALCIUM SERPL-MCNC: 9.2 MG/DL (ref 8.6–10.3)
CHLORIDE SERPL-SCNC: 103 MMOL/L (ref 98–107)
CO2 SERPL-SCNC: 27 MMOL/L (ref 21–32)
CREAT SERPL-MCNC: 2.91 MG/DL (ref 0.5–1.05)
EGFRCR SERPLBLD CKD-EPI 2021: 19 ML/MIN/1.73M*2
EOSINOPHIL # BLD AUTO: 0.16 X10*3/UL (ref 0–0.7)
EOSINOPHIL NFR BLD AUTO: 2.3 %
ERYTHROCYTE [DISTWIDTH] IN BLOOD BY AUTOMATED COUNT: 16.1 % (ref 11.5–14.5)
GLUCOSE BLD MANUAL STRIP-MCNC: 119 MG/DL (ref 74–99)
GLUCOSE BLD MANUAL STRIP-MCNC: 144 MG/DL (ref 74–99)
GLUCOSE BLD MANUAL STRIP-MCNC: 146 MG/DL (ref 74–99)
GLUCOSE BLD MANUAL STRIP-MCNC: 187 MG/DL (ref 74–99)
GLUCOSE SERPL-MCNC: 116 MG/DL (ref 74–99)
HCT VFR BLD AUTO: 25.2 % (ref 36–46)
HGB BLD-MCNC: 8.2 G/DL (ref 12–16)
IMM GRANULOCYTES # BLD AUTO: 0.02 X10*3/UL (ref 0–0.7)
IMM GRANULOCYTES NFR BLD AUTO: 0.3 % (ref 0–0.9)
LYMPHOCYTES # BLD AUTO: 0.92 X10*3/UL (ref 1.2–4.8)
LYMPHOCYTES NFR BLD AUTO: 13.1 %
MCH RBC QN AUTO: 30.3 PG (ref 26–34)
MCHC RBC AUTO-ENTMCNC: 32.5 G/DL (ref 32–36)
MCV RBC AUTO: 93 FL (ref 80–100)
MONOCYTES # BLD AUTO: 0.92 X10*3/UL (ref 0.1–1)
MONOCYTES NFR BLD AUTO: 13.1 %
NEUTROPHILS # BLD AUTO: 4.95 X10*3/UL (ref 1.2–7.7)
NEUTROPHILS NFR BLD AUTO: 70.6 %
NRBC BLD-RTO: 0 /100 WBCS (ref 0–0)
PHOSPHATE SERPL-MCNC: 4.8 MG/DL (ref 2.5–4.9)
PLATELET # BLD AUTO: 343 X10*3/UL (ref 150–450)
POTASSIUM SERPL-SCNC: 4.2 MMOL/L (ref 3.5–5.3)
PROT SERPL-MCNC: 5.9 G/DL (ref 6.4–8.2)
RBC # BLD AUTO: 2.71 X10*6/UL (ref 4–5.2)
SODIUM SERPL-SCNC: 140 MMOL/L (ref 136–145)
VANCOMYCIN SERPL-MCNC: 19.7 UG/ML (ref 5–20)
WBC # BLD AUTO: 7 X10*3/UL (ref 4.4–11.3)

## 2025-05-18 PROCEDURE — 2500000004 HC RX 250 GENERAL PHARMACY W/ HCPCS (ALT 636 FOR OP/ED): Mod: JZ | Performed by: PHYSICIAN ASSISTANT

## 2025-05-18 PROCEDURE — 2500000001 HC RX 250 WO HCPCS SELF ADMINISTERED DRUGS (ALT 637 FOR MEDICARE OP): Performed by: NURSE PRACTITIONER

## 2025-05-18 PROCEDURE — 82610 CYSTATIN C: CPT | Performed by: INTERNAL MEDICINE

## 2025-05-18 PROCEDURE — 84075 ASSAY ALKALINE PHOSPHATASE: CPT | Performed by: INTERNAL MEDICINE

## 2025-05-18 PROCEDURE — 2500000001 HC RX 250 WO HCPCS SELF ADMINISTERED DRUGS (ALT 637 FOR MEDICARE OP): Performed by: INTERNAL MEDICINE

## 2025-05-18 PROCEDURE — 2500000002 HC RX 250 W HCPCS SELF ADMINISTERED DRUGS (ALT 637 FOR MEDICARE OP, ALT 636 FOR OP/ED): Performed by: NURSE PRACTITIONER

## 2025-05-18 PROCEDURE — 1100000001 HC PRIVATE ROOM DAILY

## 2025-05-18 PROCEDURE — 80202 ASSAY OF VANCOMYCIN: CPT

## 2025-05-18 PROCEDURE — 84100 ASSAY OF PHOSPHORUS: CPT | Performed by: INTERNAL MEDICINE

## 2025-05-18 PROCEDURE — 82947 ASSAY GLUCOSE BLOOD QUANT: CPT

## 2025-05-18 PROCEDURE — 6350000001 HC RX 635 EPOETIN >10,000 UNITS: Performed by: INTERNAL MEDICINE

## 2025-05-18 PROCEDURE — 85025 COMPLETE CBC W/AUTO DIFF WBC: CPT | Performed by: INTERNAL MEDICINE

## 2025-05-18 RX ADMIN — DOCUSATE SODIUM 100 MG: 100 CAPSULE, LIQUID FILLED ORAL at 08:55

## 2025-05-18 RX ADMIN — FAMOTIDINE 10 MG: 20 TABLET, FILM COATED ORAL at 08:55

## 2025-05-18 RX ADMIN — ERYTHROPOIETIN 20000 UNITS: 20000 INJECTION, SOLUTION INTRAVENOUS; SUBCUTANEOUS at 09:18

## 2025-05-18 RX ADMIN — ACETAMINOPHEN 650 MG: 325 TABLET, FILM COATED ORAL at 13:02

## 2025-05-18 RX ADMIN — CARVEDILOL 12.5 MG: 12.5 TABLET, FILM COATED ORAL at 21:47

## 2025-05-18 RX ADMIN — CARVEDILOL 12.5 MG: 12.5 TABLET, FILM COATED ORAL at 08:55

## 2025-05-18 RX ADMIN — INSULIN LISPRO 2 UNITS: 100 INJECTION, SOLUTION INTRAVENOUS; SUBCUTANEOUS at 17:40

## 2025-05-18 RX ADMIN — ONDANSETRON 4 MG: 2 INJECTION INTRAMUSCULAR; INTRAVENOUS at 08:59

## 2025-05-18 RX ADMIN — ASPIRIN 81 MG: 81 TABLET, COATED ORAL at 08:55

## 2025-05-18 ASSESSMENT — PAIN - FUNCTIONAL ASSESSMENT
PAIN_FUNCTIONAL_ASSESSMENT: 0-10

## 2025-05-18 ASSESSMENT — COGNITIVE AND FUNCTIONAL STATUS - GENERAL
HELP NEEDED FOR BATHING: A LITTLE
MOVING TO AND FROM BED TO CHAIR: A LITTLE
TOILETING: A LITTLE
TURNING FROM BACK TO SIDE WHILE IN FLAT BAD: A LITTLE
WALKING IN HOSPITAL ROOM: A LITTLE
DAILY ACTIVITIY SCORE: 21
MOVING FROM LYING ON BACK TO SITTING ON SIDE OF FLAT BED WITH BEDRAILS: A LITTLE
STANDING UP FROM CHAIR USING ARMS: A LITTLE
MOBILITY SCORE: 17
CLIMB 3 TO 5 STEPS WITH RAILING: A LOT
DRESSING REGULAR LOWER BODY CLOTHING: A LITTLE

## 2025-05-18 ASSESSMENT — PAIN DESCRIPTION - LOCATION: LOCATION: BACK

## 2025-05-18 ASSESSMENT — PAIN SCALES - GENERAL
PAINLEVEL_OUTOF10: 0 - NO PAIN
PAINLEVEL_OUTOF10: 4
PAINLEVEL_OUTOF10: 0 - NO PAIN
PAINLEVEL_OUTOF10: 0 - NO PAIN

## 2025-05-18 NOTE — PROGRESS NOTES
Subjective   She is alert and oriented today no voiced complaint;  Renal chemistries at baseline with a creatinine of 2.91  Serum hemoglobin at 8.2  Renal ultrasound was unremarkable  Blood pressure was controlled  Urine output is adequate  Cystatin C GFR is pending  Objective          Vitals 24HR  Heart Rate:  [64-73]   Temp:  [36.4 °C (97.5 °F)-36.7 °C (98.1 °F)]   Resp:  [18]   BP: (127-135)/(81-86)   SpO2:  [97 %]         Intake/Output last 3 Shifts:    Intake/Output Summary (Last 24 hours) at 5/18/2025 1339  Last data filed at 5/18/2025 0400  Gross per 24 hour   Intake --   Output 700 ml   Net -700 ml                                                                                                                                                                   Physical Exam  General appearance; alert oriented  HEENT; there is no icterus pupils react to light accommodation  Neck; no JVD no bruit no thyromegaly no adenopathy  Lungs; clear to auscultation and percussion  Heart; regular rate no gallop no rubs no thrills   Abdomen ;active peristalsis, no guarding no rubs  Extremities; no edema  Neurological; no clonus no tremors no asterixis  Relevant Results  Results for orders placed or performed during the hospital encounter of 05/07/25 (from the past 24 hours)   POCT GLUCOSE   Result Value Ref Range    POCT Glucose 102 (H) 74 - 99 mg/dL   POCT GLUCOSE   Result Value Ref Range    POCT Glucose 147 (H) 74 - 99 mg/dL   CBC and Auto Differential   Result Value Ref Range    WBC 7.0 4.4 - 11.3 x10*3/uL    nRBC 0.0 0.0 - 0.0 /100 WBCs    RBC 2.71 (L) 4.00 - 5.20 x10*6/uL    Hemoglobin 8.2 (L) 12.0 - 16.0 g/dL    Hematocrit 25.2 (L) 36.0 - 46.0 %    MCV 93 80 - 100 fL    MCH 30.3 26.0 - 34.0 pg    MCHC 32.5 32.0 - 36.0 g/dL    RDW 16.1 (H) 11.5 - 14.5 %    Platelets 343 150 - 450 x10*3/uL    Neutrophils % 70.6 40.0 - 80.0 %    Immature Granulocytes %, Automated 0.3 0.0 - 0.9 %    Lymphocytes % 13.1 13.0 - 44.0 %     Monocytes % 13.1 2.0 - 10.0 %    Eosinophils % 2.3 0.0 - 6.0 %    Basophils % 0.6 0.0 - 2.0 %    Neutrophils Absolute 4.95 1.20 - 7.70 x10*3/uL    Immature Granulocytes Absolute, Automated 0.02 0.00 - 0.70 x10*3/uL    Lymphocytes Absolute 0.92 (L) 1.20 - 4.80 x10*3/uL    Monocytes Absolute 0.92 0.10 - 1.00 x10*3/uL    Eosinophils Absolute 0.16 0.00 - 0.70 x10*3/uL    Basophils Absolute 0.04 0.00 - 0.10 x10*3/uL   Comprehensive Metabolic Panel   Result Value Ref Range    Glucose 116 (H) 74 - 99 mg/dL    Sodium 140 136 - 145 mmol/L    Potassium 4.2 3.5 - 5.3 mmol/L    Chloride 103 98 - 107 mmol/L    Bicarbonate 27 21 - 32 mmol/L    Anion Gap 14 10 - 20 mmol/L    Urea Nitrogen 26 (H) 6 - 23 mg/dL    Creatinine 2.91 (H) 0.50 - 1.05 mg/dL    eGFR 19 (L) >60 mL/min/1.73m*2    Calcium 9.2 8.6 - 10.3 mg/dL    Albumin 3.3 (L) 3.4 - 5.0 g/dL    Alkaline Phosphatase 162 (H) 33 - 110 U/L    Total Protein 5.9 (L) 6.4 - 8.2 g/dL    AST 12 9 - 39 U/L    Bilirubin, Total 0.4 0.0 - 1.2 mg/dL    ALT 11 7 - 45 U/L   Vancomycin   Result Value Ref Range    Vancomycin 19.7 5.0 - 20.0 ug/mL   Phosphorus   Result Value Ref Range    Phosphorus 4.8 2.5 - 4.9 mg/dL   POCT GLUCOSE   Result Value Ref Range    POCT Glucose 119 (H) 74 - 99 mg/dL   POCT GLUCOSE   Result Value Ref Range    POCT Glucose 144 (H) 74 - 99 mg/dL                       Assessment & Plan  ALEIDA (acute kidney injury)  Acute interstitial nephritis  Acute kidney injury  Anemia of renal disease  Hypertension  Overt proteinuria  Hypokalemia    Anemia  Plan  Cystatin C with calculated GFR  Erythropoietin 20,000 units weekly  Renal ultrasound  monitor renal chemistries  Continue current therapy currently on IV vancomycin    I spent 20 minutes in the professional and overall care of this patient.      Arvin Sharma MD

## 2025-05-18 NOTE — PROGRESS NOTES
Tenzin Cherry is a 49 y.o. female on day 11 of admission presenting with ALEIDA (acute kidney injury).      Subjective   No events overnight. Patient seen and examined at bedside. She has no complaints.        Objective     Last Recorded Vitals  /81   Pulse 64   Temp 36.4 °C (97.5 °F)   Resp 18   Wt 54.4 kg (120 lb)   SpO2 97%   Intake/Output last 3 Shifts:    Intake/Output Summary (Last 24 hours) at 5/18/2025 1227  Last data filed at 5/18/2025 0400  Gross per 24 hour   Intake --   Output 700 ml   Net -700 ml       Admission Weight  Weight: 54.4 kg (120 lb) (05/07/25 1903)    Daily Weight  05/07/25 : 54.4 kg (120 lb)    Image Results  US renal complete  Narrative: Interpreted By:  Chong Gar,   STUDY:  US RENAL COMPLETE; 5/17/2025 3:42 pm      INDICATION:  Signs/Symptoms:Renal failure.      COMPARISON:  None      ACCESSION NUMBER(S):  [id number]      ORDERING CLINICIAN:  ENRIQUE TORRES      TECHNIQUE:  Grayscale and color Doppler imaging of the kidneys      FINDINGS:  The right kidney measures 9.8 cm  .  The left kidney measures 8.5 cm  .          No renal stones are identified.  The renal cortical thickness and  echogenicity is normal.  No hydronephrosis is identified.      Urinary bladder is nonspecific in appearance with no stones or masses  identified.  Bladder assessment is somewhat limited as the bladder  was not well distended.      Impression: Non specific appearance of the kidneys as described.      MACRO:  none      Signed by: Chong Gar 5/17/2025 5:28 PM  Dictation workstation:   [id number]      Physical Exam  Constitutional:       Appearance: Normal appearance.   HENT:      Mouth/Throat:      Mouth: Mucous membranes are dry.      Pharynx: Oropharynx is clear.   Eyes:      Pupils: Pupils are equal, round, and reactive to light.   Cardiovascular:      Rate and Rhythm: Normal rate and regular rhythm.   Pulmonary:      Effort: Pulmonary effort is normal.      Breath sounds:  Rhonchi present.   Abdominal:      General: Bowel sounds are normal.      Palpations: Abdomen is soft.   Musculoskeletal:      Cervical back: Normal range of motion.      Left hip: Tenderness present.   Skin:     General: Skin is warm and dry.      Capillary Refill: Capillary refill takes less than 2 seconds.   Neurological:      Mental Status: She is alert and oriented to person, place, and time. Mental status is at baseline.   Psychiatric:         Mood and Affect: Mood normal.      Relevant Results                    This patient has a central line   Reason for the central line remaining today? Parenteral nutrition            Assessment & Plan  ALEIDA (acute kidney injury)    Hypokalemia    Anemia    Yesimar is a 49-year-old female with past medical history of MRDD, recent left hip replacement with postop infection currently receiving IV antibiotics at her skilled nursing facility presenting to emergency department for an elevated vancomycin trough and abnormal labs.  Patient was signed out for evaluation of an abnormal creatinine and elevated vancomycin trough.  Patient denies any pain or distress.  Patient is alert and oriented x 3.  Patient was found to have a glucose of 128, potassium 3.1, creatinine 3.24, GFR 17, hemoglobin 9.6, hematocrit 28.8.  Patient is currently receiving vancomycin 1 g every 24 hours and Zosyn 3.375 g every 6 hours for a postop left hip infection.  Patient received 1 L of normal saline in ED, medicated with Tylenol p.o. and KCl 40 Meq p.o.  Admitted for further medical management.     ALEIDA/hypokalemia/anemia  Inpatient admission to Dr. Toni Locke  NS x 1 L in ED/continue LR at 75 an hour  Hold Vancomycin at this time   K+ 3.1  KCl 40 mEq p.o.  Repeat labs in a.m.  Hgb 9.6  Monitor for bleeding  Trend hemoglobin  History of cancer  Continue renal diet with thin liquids     HTN/HLD/DM2/history of uterine cancer and laryngeal mass/MRDD/left hip replacement s/p postop wound  infection  #Chronic conditions  Labs in a.m.  Vancomycin trough in a.m.  Tylenol as needed  Nursing to complete home med rec  Hold home metformin  Insulin sliding scale  Hypoglycemia protocol  PT/OT     DVT Ppx  SCDs  No indication for chemical prophylaxis  Out of bed with assistance     5/9: Creatinine improving. Continue IVF. Repeat labs tomorrow.    5/12: Creatinine has plateaued at 2.7. Renal artery ultrasound pending. Management per Nephrology. Discharge in 24-48 hours, suspect Vanc nephropathy that could take weeks to resolve.      5/13: Creatinine is 2.8 today, was 2.7 yesterday. Will resume IVF. Renal artery ultrasound unremarkable. Dispo per Nephrology.     5/14: Patient to resume Vancomycin tomorrow. Appeal for SNF once antibiotic course clarified. Creatinine improved to 2.7, continue IVF.    5/15: Patient started back on Vancomycin. Awaiting appeal for SNF.    5/16: Creatinine improved from 3.0 down to 2.9. Continue IV Vanc. Appeal for SNF pending.    5/17: Creatinine stable at 2.9.  Continue IV Vanc dosed per pharmacy.  Awaiting appeal for SNF.         Toni Locke DO

## 2025-05-18 NOTE — CARE PLAN
The patient's goals for the shift include rest.    The clinical goals for the shift include manage nausea

## 2025-05-18 NOTE — PROGRESS NOTES
Vancomycin Dosing by Pharmacy- FOLLOW UP    Tenzin Cherry is a 49 y.o. year old female who Pharmacy has been consulted for vancomycin dosing for cellulitis, skin and soft tissue. Based on the patient's indication and renal status this patient is being dosed based on a goal trough/random level of 10-15.     Renal function is currently stable.    Current vancomycin dose: 500 mg given around 1138 on 25    Most recent random level: 19.7 mcg/mL from around 0458 on 25    Visit Vitals  /85   Pulse 67   Temp 36.7 °C (98.1 °F)   Resp 18        Lab Results   Component Value Date    CREATININE 2.91 (H) 2025    CREATININE 2.92 (H) 2025    CREATININE 2.90 (H) 2025    CREATININE 3.00 (H) 05/15/2025        Patient weight is as follows:   Vitals:    25 1903   Weight: 54.4 kg (120 lb)       Cultures:  No results found for the encounter in last 14 days.       I/O last 3 completed shifts:  In: 100 (1.8 mL/kg) [IV Piggyback:100]  Out: 200 (3.7 mL/kg) [Urine:200 (0.1 mL/kg/hr)]  Dosing Weight: 54.4 kg   I/O during current shift:  I/O this shift:  In: -   Out: 700 [Urine:700]    Temp (24hrs), Av.5 °C (97.7 °F), Min:36.4 °C (97.5 °F), Max:36.7 °C (98.1 °F)      Assessment/Plan    Above goal random/trough level. Hold vancomycin today.  Note that stop date of vancomycin therapy was 25, plan to continue dosing until that time.  The next level will be obtained on  at 0500. May be obtained sooner if clinically indicated.   Will continue to monitor renal function daily while on vancomycin and order serum creatinine at least every 48 hours if not already ordered.  Follow for continued vancomycin needs, clinical response, and signs/symptoms of toxicity.       Zenaida Isaac, PharmD

## 2025-05-19 LAB
ALBUMIN SERPL BCP-MCNC: 3.4 G/DL (ref 3.4–5)
ALP SERPL-CCNC: 168 U/L (ref 33–110)
ALT SERPL W P-5'-P-CCNC: 10 U/L (ref 7–45)
ANION GAP SERPL CALC-SCNC: 13 MMOL/L (ref 10–20)
AST SERPL W P-5'-P-CCNC: 12 U/L (ref 9–39)
BASOPHILS # BLD AUTO: 0.06 X10*3/UL (ref 0–0.1)
BASOPHILS NFR BLD AUTO: 0.8 %
BILIRUB SERPL-MCNC: 0.4 MG/DL (ref 0–1.2)
BUN SERPL-MCNC: 29 MG/DL (ref 6–23)
CALCIUM SERPL-MCNC: 9.4 MG/DL (ref 8.6–10.3)
CHLORIDE SERPL-SCNC: 102 MMOL/L (ref 98–107)
CO2 SERPL-SCNC: 27 MMOL/L (ref 21–32)
CREAT SERPL-MCNC: 2.84 MG/DL (ref 0.5–1.05)
EGFRCR SERPLBLD CKD-EPI 2021: 20 ML/MIN/1.73M*2
EOSINOPHIL # BLD AUTO: 0.2 X10*3/UL (ref 0–0.7)
EOSINOPHIL NFR BLD AUTO: 2.6 %
ERYTHROCYTE [DISTWIDTH] IN BLOOD BY AUTOMATED COUNT: 16 % (ref 11.5–14.5)
GLUCOSE BLD MANUAL STRIP-MCNC: 125 MG/DL (ref 74–99)
GLUCOSE BLD MANUAL STRIP-MCNC: 125 MG/DL (ref 74–99)
GLUCOSE BLD MANUAL STRIP-MCNC: 186 MG/DL (ref 74–99)
GLUCOSE SERPL-MCNC: 107 MG/DL (ref 74–99)
HCT VFR BLD AUTO: 26.8 % (ref 36–46)
HGB BLD-MCNC: 8.6 G/DL (ref 12–16)
IMM GRANULOCYTES # BLD AUTO: 0.05 X10*3/UL (ref 0–0.7)
IMM GRANULOCYTES NFR BLD AUTO: 0.6 % (ref 0–0.9)
LYMPHOCYTES # BLD AUTO: 1.13 X10*3/UL (ref 1.2–4.8)
LYMPHOCYTES NFR BLD AUTO: 14.5 %
MCH RBC QN AUTO: 30 PG (ref 26–34)
MCHC RBC AUTO-ENTMCNC: 32.1 G/DL (ref 32–36)
MCV RBC AUTO: 93 FL (ref 80–100)
MONOCYTES # BLD AUTO: 1 X10*3/UL (ref 0.1–1)
MONOCYTES NFR BLD AUTO: 12.8 %
NEUTROPHILS # BLD AUTO: 5.37 X10*3/UL (ref 1.2–7.7)
NEUTROPHILS NFR BLD AUTO: 68.7 %
NRBC BLD-RTO: 0 /100 WBCS (ref 0–0)
PHOSPHATE SERPL-MCNC: 4.6 MG/DL (ref 2.5–4.9)
PLATELET # BLD AUTO: 376 X10*3/UL (ref 150–450)
POTASSIUM SERPL-SCNC: 4.4 MMOL/L (ref 3.5–5.3)
PROT SERPL-MCNC: 6.2 G/DL (ref 6.4–8.2)
RBC # BLD AUTO: 2.87 X10*6/UL (ref 4–5.2)
SODIUM SERPL-SCNC: 138 MMOL/L (ref 136–145)
VANCOMYCIN SERPL-MCNC: 15.7 UG/ML (ref 5–20)
WBC # BLD AUTO: 7.8 X10*3/UL (ref 4.4–11.3)

## 2025-05-19 PROCEDURE — 84100 ASSAY OF PHOSPHORUS: CPT | Performed by: INTERNAL MEDICINE

## 2025-05-19 PROCEDURE — 85025 COMPLETE CBC W/AUTO DIFF WBC: CPT | Performed by: INTERNAL MEDICINE

## 2025-05-19 PROCEDURE — 97116 GAIT TRAINING THERAPY: CPT | Mod: GP,CQ

## 2025-05-19 PROCEDURE — 2500000004 HC RX 250 GENERAL PHARMACY W/ HCPCS (ALT 636 FOR OP/ED): Mod: JZ | Performed by: PHYSICIAN ASSISTANT

## 2025-05-19 PROCEDURE — 2500000001 HC RX 250 WO HCPCS SELF ADMINISTERED DRUGS (ALT 637 FOR MEDICARE OP): Performed by: INTERNAL MEDICINE

## 2025-05-19 PROCEDURE — 80202 ASSAY OF VANCOMYCIN: CPT

## 2025-05-19 PROCEDURE — 1100000001 HC PRIVATE ROOM DAILY

## 2025-05-19 PROCEDURE — 82947 ASSAY GLUCOSE BLOOD QUANT: CPT

## 2025-05-19 PROCEDURE — 2500000002 HC RX 250 W HCPCS SELF ADMINISTERED DRUGS (ALT 637 FOR MEDICARE OP, ALT 636 FOR OP/ED): Performed by: NURSE PRACTITIONER

## 2025-05-19 PROCEDURE — 80053 COMPREHEN METABOLIC PANEL: CPT | Performed by: INTERNAL MEDICINE

## 2025-05-19 PROCEDURE — 97110 THERAPEUTIC EXERCISES: CPT | Mod: GP,CQ

## 2025-05-19 RX ADMIN — DOCUSATE SODIUM 100 MG: 100 CAPSULE, LIQUID FILLED ORAL at 09:25

## 2025-05-19 RX ADMIN — CARVEDILOL 12.5 MG: 12.5 TABLET, FILM COATED ORAL at 09:25

## 2025-05-19 RX ADMIN — ASPIRIN 81 MG: 81 TABLET, COATED ORAL at 09:25

## 2025-05-19 RX ADMIN — CARVEDILOL 12.5 MG: 12.5 TABLET, FILM COATED ORAL at 21:36

## 2025-05-19 RX ADMIN — ONDANSETRON 4 MG: 2 INJECTION INTRAMUSCULAR; INTRAVENOUS at 18:16

## 2025-05-19 RX ADMIN — FAMOTIDINE 10 MG: 20 TABLET, FILM COATED ORAL at 09:25

## 2025-05-19 RX ADMIN — INSULIN LISPRO 2 UNITS: 100 INJECTION, SOLUTION INTRAVENOUS; SUBCUTANEOUS at 12:46

## 2025-05-19 ASSESSMENT — COGNITIVE AND FUNCTIONAL STATUS - GENERAL
MOVING TO AND FROM BED TO CHAIR: A LITTLE
MOVING FROM LYING ON BACK TO SITTING ON SIDE OF FLAT BED WITH BEDRAILS: A LITTLE
TURNING FROM BACK TO SIDE WHILE IN FLAT BAD: A LITTLE
WALKING IN HOSPITAL ROOM: A LITTLE
CLIMB 3 TO 5 STEPS WITH RAILING: TOTAL
MOBILITY SCORE: 16
STANDING UP FROM CHAIR USING ARMS: A LITTLE

## 2025-05-19 ASSESSMENT — PAIN SCALES - GENERAL
PAINLEVEL_OUTOF10: 0 - NO PAIN

## 2025-05-19 ASSESSMENT — PAIN - FUNCTIONAL ASSESSMENT: PAIN_FUNCTIONAL_ASSESSMENT: 0-10

## 2025-05-19 NOTE — PROGRESS NOTES
Physical Therapy    Physical Therapy Treatment    Patient Name: Tenzin Cherry  MRN: [id number]  Department: [facility]  Room: [address] [place] [address]  Today's Date: 5/19/2025  Time Calculation  Start Time: 0929  Stop Time: 0957  Time Calculation (min): 28 min         Assessment/Plan   PT Assessment  End of Session Communication: Bedside nurse  End of Session Patient Position: Up in chair, Alarm on (Seated in armchair with tray table in front of pt, call bell in reach.)     PT Plan  Treatment/Interventions: Bed mobility, Transfer training, Gait training, Balance training, Therapeutic exercise, Therapeutic activity  PT Plan: Ongoing PT  PT Frequency: 3 times per week  PT Discharge Recommendations: Moderate intensity level of continued care  PT Recommended Transfer Status: Assist x1  PT - OK to Discharge: Yes (when cleared by medical team)    PT Visit Info:  PT Received On: 05/19/25     General Visit Information:   General  Prior to Session Communication: Bedside nurse  Patient Position Received: Up in chair, Alarm off, not on at start of session  General Comment:  (Pt seated in armchair with tray table in front.upon arrival; pleasant and agreeable to participate in PT session.)    Subjective   Precautions:  Precautions  LE Weight Bearing Status: Left Toe-Touch Weight Bearing  Medical Precautions: Fall precautions  Precautions Comment: L THR precautions, TTWB LLE, fall/safety            Objective   Pain:  Pain Assessment  Pain Assessment: 0-10  0-10 (Numeric) Pain Score: 0 - No pain              Treatments:  Therapeutic Exercise  Therapeutic Exercise Performed: Yes (Pt performed seated BLE therex consisting of: AP, HS, GS, LAQ, hip ADD isometrics with pillow, ABD 2x10 reps each.)    Ambulation/Gait Training  Ambulation/Gait Training Performed: Yes (Pt able to amb 75'x2 with FWW, TTWB LLE with CGA/SBA; demos slow steady reagan Denies dizziness and no LOB noted.)  Transfers  Transfer: Yes (Sit<>stand with FWW and CGA; v/c for  proper hand placement safety.)    Outcome Measures:      Education Documentation  Mobility Training, taught by Tejas Watt PTA at 5/19/2025 12:41 PM.  Learner: Patient  Readiness: Acceptance  Method: Explanation  Response: Verbalizes Understanding    Education Comments  No comments found.        OP EDUCATION:       Encounter Problems       Encounter Problems (Active)       PT Problem       STG - Pt will transition supine <> sitting with supervision maintaining lt thr precautions (Progressing)       Start:  05/08/25    Expected End:  05/22/25            STG - Pt will transfer STS with supervision   (Progressing)       Start:  05/08/25    Expected End:  05/22/25            STG - Pt will amb >=50' using ww maintaining LLE TTWB with sba  (Progressing)       Start:  05/08/25    Expected End:  05/22/25            STG - Pt will perform 2-3 sets of THR therex x10 to maximize functional strength and independence  (Progressing)       Start:  05/08/25    Expected End:  05/22/25               Pain - Adult

## 2025-05-19 NOTE — CARE PLAN
The patient's goals for the shift include rest.    The clinical goals for the shift include pt will be safe and comftorable      Problem: Pain - Adult  Goal: Verbalizes/displays adequate comfort level or baseline comfort level  Outcome: Progressing     Problem: Safety - Adult  Goal: Free from fall injury  Outcome: Progressing     Problem: Discharge Planning  Goal: Discharge to home or other facility with appropriate resources  Outcome: Progressing     Problem: Chronic Conditions and Co-morbidities  Goal: Patient's chronic conditions and co-morbidity symptoms are monitored and maintained or improved  Outcome: Progressing     Problem: Nutrition  Goal: Nutrient intake appropriate for maintaining nutritional needs  Outcome: Progressing     Problem: Fall/Injury  Goal: Not fall by end of shift  Outcome: Progressing  Goal: Be free from injury by end of the shift  Outcome: Progressing  Goal: Verbalize understanding of personal risk factors for fall in the hospital  Outcome: Progressing  Goal: Verbalize understanding of risk factor reduction measures to prevent injury from fall in the home  Outcome: Progressing  Goal: Use assistive devices by end of the shift  Outcome: Progressing  Goal: Pace activities to prevent fatigue by end of the shift  Outcome: Progressing     Problem: PICC line  Goal: I will remain free from symptoms of infection  Outcome: Progressing     Problem: Skin  Goal: Participates in plan/prevention/treatment measures  Outcome: Progressing  Goal: Prevent/manage excess moisture  Outcome: Progressing  Goal: Prevent/minimize sheer/friction injuries  Outcome: Progressing  Goal: Promote/optimize nutrition  Outcome: Progressing  Goal: Promote skin healing  Outcome: Progressing

## 2025-05-19 NOTE — PROGRESS NOTES
Tenzin Cherry is a 49 y.o. female on day 11 of admission presenting with ALEIDA (acute kidney injury).      Subjective   No events overnight. Patient seen and examined at bedside with family present. She has no complaints. Care discussed at length.  Family is concerned that patient is unable to bear weight due to her recent hip surgery and they are also unable to perform IV antibiotics at home.  They do not believe patient can be managed at home and will need a nursing facility.       Objective     Last Recorded Vitals  /77   Pulse 72   Temp 35.7 °C (96.3 °F)   Resp 18   Wt 54.4 kg (120 lb)   SpO2 96%   Intake/Output last 3 Shifts:    Intake/Output Summary (Last 24 hours) at 5/18/2025 2201  Last data filed at 5/18/2025 0400  Gross per 24 hour   Intake --   Output 300 ml   Net -300 ml       Admission Weight  Weight: 54.4 kg (120 lb) (05/07/25 1903)    Daily Weight  05/07/25 : 54.4 kg (120 lb)    Image Results  US renal complete  Narrative: Interpreted By:  Chong Gar,   STUDY:  US RENAL COMPLETE; 5/17/2025 3:42 pm      INDICATION:  Signs/Symptoms:Renal failure.      COMPARISON:  None      ACCESSION NUMBER(S):  [id number]      ORDERING CLINICIAN:  ENRIQUE TORRES      TECHNIQUE:  Grayscale and color Doppler imaging of the kidneys      FINDINGS:  The right kidney measures 9.8 cm  .  The left kidney measures 8.5 cm  .          No renal stones are identified.  The renal cortical thickness and  echogenicity is normal.  No hydronephrosis is identified.      Urinary bladder is nonspecific in appearance with no stones or masses  identified.  Bladder assessment is somewhat limited as the bladder  was not well distended.      Impression: Non specific appearance of the kidneys as described.      MACRO:  none      Signed by: Chong Gar 5/17/2025 5:28 PM  Dictation workstation:   [id number]      Physical Exam  Constitutional:       Appearance: Normal appearance.   HENT:      Mouth/Throat:       Mouth: Mucous membranes are dry.      Pharynx: Oropharynx is clear.   Eyes:      Pupils: Pupils are equal, round, and reactive to light.   Cardiovascular:      Rate and Rhythm: Normal rate and regular rhythm.   Pulmonary:      Effort: Pulmonary effort is normal.      Breath sounds: Rhonchi present.   Abdominal:      General: Bowel sounds are normal.      Palpations: Abdomen is soft.   Musculoskeletal:      Cervical back: Normal range of motion.      Left hip: Tenderness present.   Skin:     General: Skin is warm and dry.      Capillary Refill: Capillary refill takes less than 2 seconds.   Neurological:      Mental Status: She is alert and oriented to person, place, and time. Mental status is at baseline.   Psychiatric:         Mood and Affect: Mood normal.      Relevant Results                    This patient has a central line   Reason for the central line remaining today? Parenteral nutrition            Assessment & Plan  ALEIDA (acute kidney injury)    Hypokalemia    Anemia    Yesimar is a 49-year-old female with past medical history of MRDD, recent left hip replacement with postop infection currently receiving IV antibiotics at her skilled nursing facility presenting to emergency department for an elevated vancomycin trough and abnormal labs.  Patient was signed out for evaluation of an abnormal creatinine and elevated vancomycin trough.  Patient denies any pain or distress.  Patient is alert and oriented x 3.  Patient was found to have a glucose of 128, potassium 3.1, creatinine 3.24, GFR 17, hemoglobin 9.6, hematocrit 28.8.  Patient is currently receiving vancomycin 1 g every 24 hours and Zosyn 3.375 g every 6 hours for a postop left hip infection.  Patient received 1 L of normal saline in ED, medicated with Tylenol p.o. and KCl 40 Meq p.o.  Admitted for further medical management.     ALEIDA/hypokalemia/anemia  Inpatient admission to Dr. Toni Locke  NS x 1 L in ED/continue LR at 75 an hour  Hold Vancomycin  at this time   K+ 3.1  KCl 40 mEq p.o.  Repeat labs in a.m.  Hgb 9.6  Monitor for bleeding  Trend hemoglobin  History of cancer  Continue renal diet with thin liquids     HTN/HLD/DM2/history of uterine cancer and laryngeal mass/MRDD/left hip replacement s/p postop wound infection  #Chronic conditions  Labs in a.m.  Vancomycin trough in a.m.  Tylenol as needed  Nursing to complete home med rec  Hold home metformin  Insulin sliding scale  Hypoglycemia protocol  PT/OT     DVT Ppx  SCDs  No indication for chemical prophylaxis  Out of bed with assistance     5/9: Creatinine improving. Continue IVF. Repeat labs tomorrow.    5/12: Creatinine has plateaued at 2.7. Renal artery ultrasound pending. Management per Nephrology. Discharge in 24-48 hours, suspect Vanc nephropathy that could take weeks to resolve.      5/13: Creatinine is 2.8 today, was 2.7 yesterday. Will resume IVF. Renal artery ultrasound unremarkable. Dispo per Nephrology.     5/14: Patient to resume Vancomycin tomorrow. Appeal for SNF once antibiotic course clarified. Creatinine improved to 2.7, continue IVF.    5/15: Patient started back on Vancomycin. Awaiting appeal for SNF.    5/16: Creatinine improved from 3.0 down to 2.9. Continue IV Vanc. Appeal for SNF pending.    5/17: Creatinine stable at 2.9.  Continue IV Vanc dosed per pharmacy.  Awaiting appeal for SNF.    5/18: Creatinine is stable at 2.9 for the past 3 draws.  Renal artery ultrasound unremarkable.  Continue IV vancomycin through the 22nd.  Await appeal process.         Toni Locke,

## 2025-05-19 NOTE — CARE PLAN
The patient's goals for the shift include rest.    The clinical goals for the shift include remain comfortable

## 2025-05-19 NOTE — PROGRESS NOTES
Subjective   She is alert and oriented  Patient serum creatinine today is 2.84  Blood pressure was controlled  Urine output is adequate  Cystatin C GFR is pending  Objective          Vitals 24HR  Heart Rate:  [66-72]   Temp:  [35.7 °C (96.3 °F)-36.5 °C (97.7 °F)]   Resp:  [18-22]   BP: (131-140)/(77-87)   SpO2:  [96 %-99 %]         Intake/Output last 3 Shifts:    Intake/Output Summary (Last 24 hours) at 5/19/2025 1113  Last data filed at 5/19/2025 0500  Gross per 24 hour   Intake --   Output 1000 ml   Net -1000 ml                                                                                                                                                                   Physical Exam  General appearance; alert oriented  HEENT; there is no icterus pupils react to light accommodation  Neck; no JVD no bruit no thyromegaly no adenopathy  Lungs; clear to auscultation and percussion  Heart; regular rate no gallop no rubs no thrills   Abdomen ;active peristalsis, no guarding no rubs  Extremities; no edema  Neurological; no clonus no tremors no asterixis  Relevant Results  Results for orders placed or performed during the hospital encounter of 05/07/25 (from the past 24 hours)   POCT GLUCOSE   Result Value Ref Range    POCT Glucose 187 (H) 74 - 99 mg/dL   POCT GLUCOSE   Result Value Ref Range    POCT Glucose 146 (H) 74 - 99 mg/dL   CBC and Auto Differential   Result Value Ref Range    WBC 7.8 4.4 - 11.3 x10*3/uL    nRBC 0.0 0.0 - 0.0 /100 WBCs    RBC 2.87 (L) 4.00 - 5.20 x10*6/uL    Hemoglobin 8.6 (L) 12.0 - 16.0 g/dL    Hematocrit 26.8 (L) 36.0 - 46.0 %    MCV 93 80 - 100 fL    MCH 30.0 26.0 - 34.0 pg    MCHC 32.1 32.0 - 36.0 g/dL    RDW 16.0 (H) 11.5 - 14.5 %    Platelets 376 150 - 450 x10*3/uL    Neutrophils % 68.7 40.0 - 80.0 %    Immature Granulocytes %, Automated 0.6 0.0 - 0.9 %    Lymphocytes % 14.5 13.0 - 44.0 %    Monocytes % 12.8 2.0 - 10.0 %    Eosinophils % 2.6 0.0 - 6.0 %    Basophils % 0.8 0.0 - 2.0 %     Neutrophils Absolute 5.37 1.20 - 7.70 x10*3/uL    Immature Granulocytes Absolute, Automated 0.05 0.00 - 0.70 x10*3/uL    Lymphocytes Absolute 1.13 (L) 1.20 - 4.80 x10*3/uL    Monocytes Absolute 1.00 0.10 - 1.00 x10*3/uL    Eosinophils Absolute 0.20 0.00 - 0.70 x10*3/uL    Basophils Absolute 0.06 0.00 - 0.10 x10*3/uL   Comprehensive Metabolic Panel   Result Value Ref Range    Glucose 107 (H) 74 - 99 mg/dL    Sodium 138 136 - 145 mmol/L    Potassium 4.4 3.5 - 5.3 mmol/L    Chloride 102 98 - 107 mmol/L    Bicarbonate 27 21 - 32 mmol/L    Anion Gap 13 10 - 20 mmol/L    Urea Nitrogen 29 (H) 6 - 23 mg/dL    Creatinine 2.84 (H) 0.50 - 1.05 mg/dL    eGFR 20 (L) >60 mL/min/1.73m*2    Calcium 9.4 8.6 - 10.3 mg/dL    Albumin 3.4 3.4 - 5.0 g/dL    Alkaline Phosphatase 168 (H) 33 - 110 U/L    Total Protein 6.2 (L) 6.4 - 8.2 g/dL    AST 12 9 - 39 U/L    Bilirubin, Total 0.4 0.0 - 1.2 mg/dL    ALT 10 7 - 45 U/L   Vancomycin   Result Value Ref Range    Vancomycin 15.7 5.0 - 20.0 ug/mL   Phosphorus   Result Value Ref Range    Phosphorus 4.6 2.5 - 4.9 mg/dL   POCT GLUCOSE   Result Value Ref Range    POCT Glucose 125 (H) 74 - 99 mg/dL                       Assessment & Plan  ALEIDA (acute kidney injury)  Acute interstitial nephritis  Acute kidney injury  Anemia of renal disease  Hypertension  Overt proteinuria  Hypokalemia    Anemia  Plan  Cystatin C with calculated GFR  monitor renal chemistries  Continue current therapy currently on IV vancomycin    I spent 20 minutes in the professional and overall care of this patient.      Arvin Sharma MD

## 2025-05-19 NOTE — PROGRESS NOTES
Call placed to insurance company at [phone]. They did not receive the fax previously sent. Re faxed all required clinical data and cover letter to [phone] for expedited appeal.

## 2025-05-19 NOTE — PROGRESS NOTES
Vancomycin Dosing by Pharmacy- FOLLOW UP    Tenzin Cherry is a 49 y.o. year old female who Pharmacy has been consulted for vancomycin dosing for cellulitis, skin and soft tissue. Based on the patient's indication and renal status this patient is being dosed based on a goal trough/random level of 10-15.     Renal function is currently stable.    Last vancomycin dose: 500 mg given x 1 dose on 25 @ 1138    Most recent trough level: 15.7 mcg/mL    Visit Vitals  /82 (BP Location: Left arm, Patient Position: Lying)   Pulse 66   Temp 36.1 °C (97 °F) (Temporal)   Resp 22        Lab Results   Component Value Date    CREATININE 2.84 (H) 2025    CREATININE 2.91 (H) 2025    CREATININE 2.92 (H) 2025    CREATININE 2.90 (H) 2025        Patient weight is as follows:   Vitals:    25 1903   Weight: 54.4 kg (120 lb)       Cultures:  No results found for the encounter in last 14 days.       I/O last 3 completed shifts:  In: - (0 mL/kg)   Out: 1700 (31.3 mL/kg) [Urine:1700 (0.9 mL/kg/hr)]  Dosing Weight: 54.4 kg   I/O during current shift:  No intake/output data recorded.    Temp (24hrs), Av.1 °C (97 °F), Min:35.7 °C (96.3 °F), Max:36.5 °C (97.7 °F)      Assessment/Plan    Trough level of 15.7mcg/mL remains supra-therapeutic for goal range of 10-15mcg/mL for indication of cellulitis, skin and soft tissue infection. As such, will continue to HOLD vancomycin therapy today. Last dose of vancomycin administered 25 @ ~1130. Note: stop date of vancomycin therapy 25, plan to continue dosing until that time.   The next level will be obtained on 25 with AM labs. May be obtained sooner if clinically indicated.   Will continue to monitor renal function daily while on vancomycin and order serum creatinine at least every 48 hours if not already ordered.  Follow for continued vancomycin needs, clinical response, and signs/symptoms of toxicity.     Pamela Haas PharmD, BCPS    5/19/2025 7:33 AM

## 2025-05-20 LAB
ALBUMIN SERPL BCP-MCNC: 3.5 G/DL (ref 3.4–5)
ALP SERPL-CCNC: 165 U/L (ref 33–110)
ALT SERPL W P-5'-P-CCNC: 11 U/L (ref 7–45)
ANION GAP SERPL CALC-SCNC: 10 MMOL/L (ref 10–20)
AST SERPL W P-5'-P-CCNC: 12 U/L (ref 9–39)
BASOPHILS # BLD AUTO: 0.07 X10*3/UL (ref 0–0.1)
BASOPHILS NFR BLD AUTO: 0.8 %
BILIRUB SERPL-MCNC: 0.4 MG/DL (ref 0–1.2)
BUN SERPL-MCNC: 30 MG/DL (ref 6–23)
CALCIUM SERPL-MCNC: 9.8 MG/DL (ref 8.6–10.3)
CHLORIDE SERPL-SCNC: 101 MMOL/L (ref 98–107)
CO2 SERPL-SCNC: 29 MMOL/L (ref 21–32)
CREAT SERPL-MCNC: 2.85 MG/DL (ref 0.5–1.05)
CREAT SERPL-MCNC: 2.88 MG/DL (ref 0.59–1.01)
CYSTATIN C SERPL-MCNC: 2.87 MG/L (ref 0.61–0.95)
EGFRCR SERPLBLD CKD-EPI 2021: 20 ML/MIN/1.73M*2
EGFRCR-CYS SERPLBLD CKD-EPI 2021: 18 ML/MIN/{1.73_M2}
EOSINOPHIL # BLD AUTO: 0.17 X10*3/UL (ref 0–0.7)
EOSINOPHIL NFR BLD AUTO: 1.9 %
ERYTHROCYTE [DISTWIDTH] IN BLOOD BY AUTOMATED COUNT: 15.7 % (ref 11.5–14.5)
GLUCOSE BLD MANUAL STRIP-MCNC: 162 MG/DL (ref 74–99)
GLUCOSE BLD MANUAL STRIP-MCNC: 190 MG/DL (ref 74–99)
GLUCOSE SERPL-MCNC: 118 MG/DL (ref 74–99)
HCT VFR BLD AUTO: 29.5 % (ref 36–46)
HGB BLD-MCNC: 9.4 G/DL (ref 12–16)
IMM GRANULOCYTES # BLD AUTO: 0.08 X10*3/UL (ref 0–0.7)
IMM GRANULOCYTES NFR BLD AUTO: 0.9 % (ref 0–0.9)
LYMPHOCYTES # BLD AUTO: 1.23 X10*3/UL (ref 1.2–4.8)
LYMPHOCYTES NFR BLD AUTO: 13.9 %
MCH RBC QN AUTO: 29.5 PG (ref 26–34)
MCHC RBC AUTO-ENTMCNC: 31.9 G/DL (ref 32–36)
MCV RBC AUTO: 93 FL (ref 80–100)
MONOCYTES # BLD AUTO: 1.28 X10*3/UL (ref 0.1–1)
MONOCYTES NFR BLD AUTO: 14.5 %
NEUTROPHILS # BLD AUTO: 6.02 X10*3/UL (ref 1.2–7.7)
NEUTROPHILS NFR BLD AUTO: 68 %
NRBC BLD-RTO: 0.3 /100 WBCS (ref 0–0)
PLATELET # BLD AUTO: 405 X10*3/UL (ref 150–450)
POTASSIUM SERPL-SCNC: 5 MMOL/L (ref 3.5–5.3)
PROT SERPL-MCNC: 6.6 G/DL (ref 6.4–8.2)
RBC # BLD AUTO: 3.19 X10*6/UL (ref 4–5.2)
SODIUM SERPL-SCNC: 135 MMOL/L (ref 136–145)
VANCOMYCIN TROUGH SERPL-MCNC: 12.5 UG/ML (ref 5–20)
WBC # BLD AUTO: 8.9 X10*3/UL (ref 4.4–11.3)

## 2025-05-20 PROCEDURE — 97116 GAIT TRAINING THERAPY: CPT | Mod: GP,CQ

## 2025-05-20 PROCEDURE — 82947 ASSAY GLUCOSE BLOOD QUANT: CPT

## 2025-05-20 PROCEDURE — 2500000001 HC RX 250 WO HCPCS SELF ADMINISTERED DRUGS (ALT 637 FOR MEDICARE OP): Performed by: INTERNAL MEDICINE

## 2025-05-20 PROCEDURE — 1100000001 HC PRIVATE ROOM DAILY

## 2025-05-20 PROCEDURE — 2500000004 HC RX 250 GENERAL PHARMACY W/ HCPCS (ALT 636 FOR OP/ED): Mod: JZ | Performed by: PHYSICIAN ASSISTANT

## 2025-05-20 PROCEDURE — 80202 ASSAY OF VANCOMYCIN: CPT | Performed by: INTERNAL MEDICINE

## 2025-05-20 PROCEDURE — 85025 COMPLETE CBC W/AUTO DIFF WBC: CPT | Performed by: INTERNAL MEDICINE

## 2025-05-20 PROCEDURE — 80053 COMPREHEN METABOLIC PANEL: CPT | Performed by: INTERNAL MEDICINE

## 2025-05-20 PROCEDURE — 2500000001 HC RX 250 WO HCPCS SELF ADMINISTERED DRUGS (ALT 637 FOR MEDICARE OP): Performed by: NURSE PRACTITIONER

## 2025-05-20 PROCEDURE — 2500000002 HC RX 250 W HCPCS SELF ADMINISTERED DRUGS (ALT 637 FOR MEDICARE OP, ALT 636 FOR OP/ED): Performed by: NURSE PRACTITIONER

## 2025-05-20 RX ADMIN — FAMOTIDINE 10 MG: 20 TABLET, FILM COATED ORAL at 09:18

## 2025-05-20 RX ADMIN — DOCUSATE SODIUM 100 MG: 100 CAPSULE, LIQUID FILLED ORAL at 09:18

## 2025-05-20 RX ADMIN — CARVEDILOL 12.5 MG: 12.5 TABLET, FILM COATED ORAL at 09:18

## 2025-05-20 RX ADMIN — INSULIN LISPRO 2 UNITS: 100 INJECTION, SOLUTION INTRAVENOUS; SUBCUTANEOUS at 17:41

## 2025-05-20 RX ADMIN — CARVEDILOL 12.5 MG: 12.5 TABLET, FILM COATED ORAL at 20:19

## 2025-05-20 RX ADMIN — ACETAMINOPHEN 650 MG: 325 TABLET, FILM COATED ORAL at 20:20

## 2025-05-20 RX ADMIN — ONDANSETRON 4 MG: 2 INJECTION INTRAMUSCULAR; INTRAVENOUS at 12:57

## 2025-05-20 RX ADMIN — ASPIRIN 81 MG: 81 TABLET, COATED ORAL at 09:18

## 2025-05-20 RX ADMIN — INSULIN LISPRO 2 UNITS: 100 INJECTION, SOLUTION INTRAVENOUS; SUBCUTANEOUS at 12:26

## 2025-05-20 ASSESSMENT — PAIN - FUNCTIONAL ASSESSMENT
PAIN_FUNCTIONAL_ASSESSMENT: 0-10
PAIN_FUNCTIONAL_ASSESSMENT: 0-10

## 2025-05-20 ASSESSMENT — COGNITIVE AND FUNCTIONAL STATUS - GENERAL
PERSONAL GROOMING: A LITTLE
MOVING FROM LYING ON BACK TO SITTING ON SIDE OF FLAT BED WITH BEDRAILS: A LITTLE
DRESSING REGULAR LOWER BODY CLOTHING: A LOT
HELP NEEDED FOR BATHING: A LOT
MOVING TO AND FROM BED TO CHAIR: A LITTLE
DRESSING REGULAR UPPER BODY CLOTHING: A LITTLE
STANDING UP FROM CHAIR USING ARMS: A LITTLE
DAILY ACTIVITIY SCORE: 16
CLIMB 3 TO 5 STEPS WITH RAILING: TOTAL
TOILETING: A LOT
MOBILITY SCORE: 16
WALKING IN HOSPITAL ROOM: A LITTLE
TURNING FROM BACK TO SIDE WHILE IN FLAT BAD: A LITTLE

## 2025-05-20 ASSESSMENT — PAIN DESCRIPTION - LOCATION: LOCATION: NECK

## 2025-05-20 ASSESSMENT — PAIN SCALES - GENERAL
PAINLEVEL_OUTOF10: 0 - NO PAIN
PAINLEVEL_OUTOF10: 3
PAINLEVEL_OUTOF10: 0 - NO PAIN
PAINLEVEL_OUTOF10: 0 - NO PAIN

## 2025-05-20 NOTE — CARE PLAN
The patient's goals for the shift include rest.    The clinical goals for the shift include safety, comfort, and pain control.    Pain - Adult  Add All  Verbalizes/displays adequate comfort level or baseline comfort level  Add  Today at 0116 - Progressing by Chucky Terrazas, RN  Add  Safety - Adult  Add All  Free from fall injury  Add  Today at 0116 - Progressing by Chucky Terrazas, RN  Add  Discharge Planning  Add All  Discharge to home or other facility with appropriate resources  Add  Today at 0116 - Progressing by Chucky Terrazas, RN  Add  Chronic Conditions and Co-morbidities  Add All  Patient's chronic conditions and co-morbidity symptoms are monitored and maintained or improved  Add  Today at 0116 - Progressing by Chucky Terrazas, CHAY  Add  Nutrition  Add All  Nutrient intake appropriate for maintaining nutritional needs  Add  Today at 0116 - Progressing by Chucky Terrzaas, CHAY  Add  Fall/Injury  Add All  Not fall by end of shift  Add  Today at 0116 - Progressing by Chucky Terrazas, CHAY  Add  Be free from injury by end of the shift  Add  Today at 0116 - Progressing by Chucky Terrazas, RN  Add  Verbalize understanding of personal risk factors for fall in the hospital  Add  Today at 0116 - Progressing by Chucky Terrazas, RN  Add  Verbalize understanding of risk factor reduction measures to prevent injury from fall in the home  Add  Today at 0116 - Progressing by Chucky Terrazas, CHAY  Add  Use assistive devices by end of the shift  Add  Today at 0116 - Progressing by Chucky Terrazas, CHAY  Add  Pace activities to prevent fatigue by end of the shift  Add  Today at 0116 - Progressing by Chucky Terrazas, RN  Add  PICC line  Add All  I will remain free from symptoms of infection  Add  Today at 0116 - Progressing by Chucky Terrazas, CHAY  Add  Skin  Add All  Participates in plan/prevention/treatment measures  Add  Today at 0116 - Progressing by Chucky Terrazas RN  Add  Flowsheets  Taken today at  0116  Participates in plan/prevention/treatment measures  Increase activity/out of bed for meals  Prevent/manage excess moisture  Add  Today at 0116 - Progressing by Chucky Terrazas RN  Add  Flowsheets  Taken today at 0116  Prevent/manage excess moisture  Monitor for/manage infection if present  Prevent/minimize sheer/friction injuries  Add  Today at 0116 - Progressing by Chucky Terrazas RN  Add  Flowsheets  Taken today at 0116  Prevent/minimize sheer/friction injuries  HOB 30 degrees or less  Promote/optimize nutrition  Add  Today at 0116 - Progressing by Chucky Terrazas RN  Add  Flowsheets  Taken today at 0116  Promote/optimize nutrition  Offer water/supplements/favorite foods  Promote skin healing  Add  Today at 0116 - Progressing by Chucky Terrazas RN  Add  Flowsheets  Taken today at 0116  Promote skin healing  Assess skin/pad under line(s)/device(s)

## 2025-05-20 NOTE — PROGRESS NOTES
Physical Therapy    Physical Therapy Treatment    Patient Name: Tenzin Cherry  MRN: [id number]  Department: [facility]  Room: [address] [place] [address]  Today's Date: 5/20/2025  Time Calculation  Start Time: 0948  Stop Time: 1007  Time Calculation (min): 19 min         Assessment/Plan   PT Assessment  End of Session Communication: Bedside nurse  End of Session Patient Position: Up in chair, Alarm on     PT Plan  Treatment/Interventions: Bed mobility, Transfer training, Gait training, Balance training, Therapeutic exercise, Therapeutic activity  PT Plan: Ongoing PT  PT Frequency: 3 times per week  PT Discharge Recommendations: Moderate intensity level of continued care  PT Recommended Transfer Status: Assist x1  PT - OK to Discharge: Yes (when cleared by medical team)      General Visit Information:   General  Co-Treatment: OT  Co-Treatment Reason: to maximize pt safety& mobility  Prior to Session Communication: Bedside nurse  Patient Position Received: Bed, 2 rail up, Alarm off, not on at start of session  General Comment:  (pt pleasant and agreeable to participate in therapy)    Subjective   Precautions:  Precautions  LE Weight Bearing Status: Left Toe-Touch Weight Bearing  Medical Precautions: Fall precautions  Post-Surgical Precautions: Left hip precautions  Precautions Comment: purewick removed as pt continent and able to ambulate to bathroom/bsc; communicated with nursing        Objective   Pain:  Pain Assessment  Pain Assessment: 0-10  0-10 (Numeric) Pain Score: 0 - No pain    Cognition:  Cognition  Overall Cognitive Status: Within Functional Limits     Treatments:  Bed Mobility  Bed Mobility:  (sup > sit with SBA;  HOB elevated and bedrail used to assist)    Ambulation/Gait Training  Ambulation/Gait Training Performed:  (pt ambulates >/=75 ft x 2 with FWW and CGA.  slow, steady pace; no LOB.  pt adheres well to LLE TTWB status.)    Transfers  Transfer:  (sit <> stand x2 trials with FWW and CGA)    Outcome  Measures:  [facility] Basic Mobility  Turning from your back to your side while in a flat bed without using bedrails: A little  Moving from lying on your back to sitting on the side of a flat bed without using bedrails: A little  Moving to and from bed to chair (including a wheelchair): A little  Standing up from a chair using your arms (e.g. wheelchair or bedside chair): A little  To walk in hospital room: A little  Climbing 3-5 steps with railing: Total  Basic Mobility - Total Score: 16    Education Documentation  Precautions, taught by Debbie Valencia PTA at 5/20/2025 12:12 PM.  Learner: Patient  Readiness: Acceptance  Method: Explanation  Response: Verbalizes Understanding    Mobility Training, taught by Debbie Valencia PTA at 5/20/2025 12:12 PM.  Learner: Patient  Readiness: Acceptance  Method: Explanation  Response: Verbalizes Understanding    Education Comments  No comments found.        EDUCATION:       Encounter Problems       Encounter Problems (Active)       PT Problem       STG - Pt will transition supine <> sitting with supervision maintaining lt thr precautions (Progressing)       Start:  05/08/25    Expected End:  05/22/25            STG - Pt will transfer STS with supervision   (Progressing)       Start:  05/08/25    Expected End:  05/22/25            STG - Pt will amb >=50' using ww maintaining LLE TTWB with sba  (Progressing)       Start:  05/08/25    Expected End:  05/22/25            STG - Pt will perform 2-3 sets of THR therex x10 to maximize functional strength and independence  (Progressing)       Start:  05/08/25    Expected End:  05/22/25                             Take medications as prescribed for: left knee pain.  Over-the-counter Tylenol 500 mg (1 or 2 every 6 hours) and/or Naproxen 500 mg (1 every 12 hours) for pain control.     Follow with Sports Medicine. Call [phone] and ask for an appointment at a convenient location.     Knee immobilizer and crutches as needed if too much pain.    Return if pain not controlled with oral medications. Take medications as prescribed for: left knee pain.  Over-the-counter Tylenol 500 mg (1 or 2 every 6 hours) and/or Naproxen 500 mg (1 every 12 hours) for pain control.     Use cold compresses (such as ice in a plastic bag) over affected areas for 15 minutes 3 times a day x 3 days.     Follow with Sports Medicine. Call [phone] and ask for an appointment at a convenient location.     Knee immobilizer and crutches as needed if too much pain.    Return if pain not controlled with oral medications.

## 2025-05-20 NOTE — PROGRESS NOTES
Subjective   She is alert and oriented  Patient serum creatinine today is 2.85  Cystatin C still pending.  Hemoglobin today is 9.4  Objective          Vitals 24HR  Heart Rate:  [63-74]   Temp:  [35.1 °C (95.2 °F)-36.8 °C (98.2 °F)]   Resp:  [18]   BP: (114-145)/(75-85)   SpO2:  [97 %-98 %]         Intake/Output last 3 Shifts:    Intake/Output Summary (Last 24 hours) at 5/20/2025 1318  Last data filed at 5/20/2025 0608  Gross per 24 hour   Intake --   Output 300 ml   Net -300 ml                                                                                                                                                                   Physical Exam  General appearance; alert oriented  HEENT; there is no icterus pupils react to light accommodation  Neck; no JVD no bruit no thyromegaly no adenopathy  Lungs; clear to auscultation and percussion  Heart; regular rate no gallop no rubs no thrills   Abdomen ;active peristalsis, no guarding no rubs  Extremities; no edema  Neurological; no clonus no tremors no asterixis  Relevant Results  Results for orders placed or performed during the hospital encounter of 05/07/25 (from the past 24 hours)   POCT GLUCOSE   Result Value Ref Range    POCT Glucose 125 (H) 74 - 99 mg/dL   CBC and Auto Differential   Result Value Ref Range    WBC 8.9 4.4 - 11.3 x10*3/uL    nRBC 0.3 (H) 0.0 - 0.0 /100 WBCs    RBC 3.19 (L) 4.00 - 5.20 x10*6/uL    Hemoglobin 9.4 (L) 12.0 - 16.0 g/dL    Hematocrit 29.5 (L) 36.0 - 46.0 %    MCV 93 80 - 100 fL    MCH 29.5 26.0 - 34.0 pg    MCHC 31.9 (L) 32.0 - 36.0 g/dL    RDW 15.7 (H) 11.5 - 14.5 %    Platelets 405 150 - 450 x10*3/uL    Neutrophils % 68.0 40.0 - 80.0 %    Immature Granulocytes %, Automated 0.9 0.0 - 0.9 %    Lymphocytes % 13.9 13.0 - 44.0 %    Monocytes % 14.5 2.0 - 10.0 %    Eosinophils % 1.9 0.0 - 6.0 %    Basophils % 0.8 0.0 - 2.0 %    Neutrophils Absolute 6.02 1.20 - 7.70 x10*3/uL    Immature Granulocytes Absolute, Automated 0.08 0.00 -  0.70 x10*3/uL    Lymphocytes Absolute 1.23 1.20 - 4.80 x10*3/uL    Monocytes Absolute 1.28 (H) 0.10 - 1.00 x10*3/uL    Eosinophils Absolute 0.17 0.00 - 0.70 x10*3/uL    Basophils Absolute 0.07 0.00 - 0.10 x10*3/uL   Comprehensive Metabolic Panel   Result Value Ref Range    Glucose 118 (H) 74 - 99 mg/dL    Sodium 135 (L) 136 - 145 mmol/L    Potassium 5.0 3.5 - 5.3 mmol/L    Chloride 101 98 - 107 mmol/L    Bicarbonate 29 21 - 32 mmol/L    Anion Gap 10 10 - 20 mmol/L    Urea Nitrogen 30 (H) 6 - 23 mg/dL    Creatinine 2.85 (H) 0.50 - 1.05 mg/dL    eGFR 20 (L) >60 mL/min/1.73m*2    Calcium 9.8 8.6 - 10.3 mg/dL    Albumin 3.5 3.4 - 5.0 g/dL    Alkaline Phosphatase 165 (H) 33 - 110 U/L    Total Protein 6.6 6.4 - 8.2 g/dL    AST 12 9 - 39 U/L    Bilirubin, Total 0.4 0.0 - 1.2 mg/dL    ALT 11 7 - 45 U/L   Vancomycin, Trough   Result Value Ref Range    Vancomycin, Trough 12.5 5.0 - 20.0 ug/mL                       Assessment & Plan  ALEIDA (acute kidney injury)  Acute interstitial nephritis  Acute kidney injury  Anemia of renal disease  Hypertension  Overt proteinuria  Hypokalemia    Anemia  Plan  Cystatin C with calculated GFR  monitor renal chemistries  Continue current therapy   I spent 20 minutes in the professional and overall care of this patient.      Arvin Sharma MD

## 2025-05-20 NOTE — PROGRESS NOTES
Occupational Therapy    OT Treatment    Patient Name: Tenzin Cherry  MRN: [id number]  Department: [facility]  Room: [address] [place] [address]  Today's Date: 5/20/2025  Time Calculation  Start Time: 0949  Stop Time: 1006  Time Calculation (min): 17 min        Assessment:  End of Session Communication: Bedside nurse  End of Session Patient Position: Up in chair, Alarm on (call light in reach, all needs met)     Plan:  Treatment Interventions: ADL retraining, Functional transfer training, Neuromuscular reeducation, Compensatory technique education  OT Frequency: 3 times per week  OT Discharge Recommendations: Moderate intensity level of continued care  OT - OK to Discharge: Yes (from an O.T. standpoint)  Treatment Interventions: ADL retraining, Functional transfer training, Neuromuscular reeducation, Compensatory technique education    Subjective   OT Visit Info:  OT Received On: 05/20/25  General Visit Info:  General  Co-Treatment: PT  Co-Treatment Reason: for safety and to maximize therapeutic performance  Prior to Session Communication: Bedside nurse  Patient Position Received: Bed, 2 rail up, Alarm on  General Comment: patient pleasant and cooperative to participate  Precautions:  LE Weight Bearing Status: Left Toe-Touch Weight Bearing  Medical Precautions: Fall precautions  Precautions Comment: L THR precautions, TTWB LLE, fall/safety, posey alarm            Pain:  Pain Assessment  Pain Assessment:  (denies pain)    Objective    Cognition:  Cognition  Overall Cognitive Status: Within Functional Limits    Activities of Daily Living: Grooming  Grooming Level of Assistance: Close supervision  Grooming Where Assessed: Recliner  Grooming Comments: to brush teeth    LE Dressing  LE Dressing: Yes  LE Dressing Adaptive Equipment: Reacher  Pants Level of Assistance: Minimum assistance  LE Dressing Where Assessed: Edge of bed  LE Dressing Comments: unilateral support of WW when donning over waist/hips. min cues for precautions, demo  fair carryover overall       Bed Mobility/Transfers: Bed Mobility  Bed Mobility:  (completed supine to sit with SBA with HOB elevated and use of bed rail)    Transfers  Transfer:  (completed STS with CGA with support of WW; all precautions maintained throughout)      Functional Mobility:  Functional Mobility  Functional Mobility Performed:  (engaged in simple mobility on nursing unit with WW and CGA; Patient with good adherence to precautions throughout)        Outcome Measures:[facility] Daily Activity  Putting on and taking off regular lower body clothing: A lot  Bathing (including washing, rinsing, drying): A lot  Putting on and taking off regular upper body clothing: A little  Toileting, which includes using toilet, bedpan or urinal: A lot  Taking care of personal grooming such as brushing teeth: A little  Eating Meals: None  Daily Activity - Total Score: 16        Education Documentation  Precautions, taught by Tierra Morillo OT at 5/20/2025 11:21 AM.  Learner: Patient  Readiness: Acceptance  Method: Explanation  Response: Verbalizes Understanding, Needs Reinforcement  Comment: ADL/safe transfer techniques and precautions    ADL Training, taught by Tierra Morillo OT at 5/20/2025 11:21 AM.  Learner: Patient  Readiness: Acceptance  Method: Explanation  Response: Verbalizes Understanding, Needs Reinforcement  Comment: ADL/safe transfer techniques and precautions    Education Comments  No comments found.        OP EDUCATION:       Goals:  Encounter Problems       Encounter Problems (Active)       OT Goals       Increase LE dressing & toileting to SBA with adaptive equipment as needed.  (Progressing)       Start:  05/08/25    Expected End:  05/22/25            Increase functional transfers & mobility to/from bed, chair & commode to SBA with DME for safety  (Progressing)       Start:  05/08/25    Expected End:  05/22/25            Demonstrate compliance to post op precautions and safety techs with min cues during ADLs   (Progressing)       Start:  05/08/25    Expected End:  05/22/25

## 2025-05-20 NOTE — PROGRESS NOTES
Tenzin Cherry is a 49 y.o. female on day 12 of admission presenting with ALEIDA (acute kidney injury).      Subjective   No events overnight. Patient seen and examined at bedside. She has no complaints.        Objective     Last Recorded Vitals  /84 (BP Location: Left arm, Patient Position: Lying)   Pulse 74   Temp 36.8 °C (98.2 °F) (Temporal)   Resp 18   Wt 54.4 kg (120 lb)   SpO2 98%   Intake/Output last 3 Shifts:    Intake/Output Summary (Last 24 hours) at 5/19/2025 2121  Last data filed at 5/19/2025 0500  Gross per 24 hour   Intake --   Output 1000 ml   Net -1000 ml       Admission Weight  Weight: 54.4 kg (120 lb) (05/07/25 1903)    Daily Weight  05/07/25 : 54.4 kg (120 lb)    Image Results  US renal complete  Narrative: Interpreted By:  Chong Gar,   STUDY:  US RENAL COMPLETE; 5/17/2025 3:42 pm      INDICATION:  Signs/Symptoms:Renal failure.      COMPARISON:  None      ACCESSION NUMBER(S):  [id number]      ORDERING CLINICIAN:  ENRIQUE TORRES      TECHNIQUE:  Grayscale and color Doppler imaging of the kidneys      FINDINGS:  The right kidney measures 9.8 cm  .  The left kidney measures 8.5 cm  .          No renal stones are identified.  The renal cortical thickness and  echogenicity is normal.  No hydronephrosis is identified.      Urinary bladder is nonspecific in appearance with no stones or masses  identified.  Bladder assessment is somewhat limited as the bladder  was not well distended.      Impression: Non specific appearance of the kidneys as described.      MACRO:  none      Signed by: Chong Gar 5/17/2025 5:28 PM  Dictation workstation:   [id number]      Physical Exam  Constitutional:       Appearance: Normal appearance.   HENT:      Mouth/Throat:      Mouth: Mucous membranes are dry.      Pharynx: Oropharynx is clear.   Eyes:      Pupils: Pupils are equal, round, and reactive to light.   Cardiovascular:      Rate and Rhythm: Normal rate and regular rhythm.   Pulmonary:       Effort: Pulmonary effort is normal.      Breath sounds: Rhonchi present.   Abdominal:      General: Bowel sounds are normal.      Palpations: Abdomen is soft.   Musculoskeletal:      Cervical back: Normal range of motion.      Left hip: Tenderness present.   Skin:     General: Skin is warm and dry.      Capillary Refill: Capillary refill takes less than 2 seconds.   Neurological:      Mental Status: She is alert and oriented to person, place, and time. Mental status is at baseline.   Psychiatric:         Mood and Affect: Mood normal.      Relevant Results                    This patient has a central line   Reason for the central line remaining today? Parenteral nutrition            Assessment & Plan  ALEIDA (acute kidney injury)    Hypokalemia    Anemia    Yesimar is a 49-year-old female with past medical history of MRDD, recent left hip replacement with postop infection currently receiving IV antibiotics at her skilled nursing facility presenting to emergency department for an elevated vancomycin trough and abnormal labs.  Patient was signed out for evaluation of an abnormal creatinine and elevated vancomycin trough.  Patient denies any pain or distress.  Patient is alert and oriented x 3.  Patient was found to have a glucose of 128, potassium 3.1, creatinine 3.24, GFR 17, hemoglobin 9.6, hematocrit 28.8.  Patient is currently receiving vancomycin 1 g every 24 hours and Zosyn 3.375 g every 6 hours for a postop left hip infection.  Patient received 1 L of normal saline in ED, medicated with Tylenol p.o. and KCl 40 Meq p.o.  Admitted for further medical management.     ALEIDA/hypokalemia/anemia  Inpatient admission to Dr. Toni Locke  NS x 1 L in ED/continue LR at 75 an hour  Hold Vancomycin at this time   K+ 3.1  KCl 40 mEq p.o.  Repeat labs in a.m.  Hgb 9.6  Monitor for bleeding  Trend hemoglobin  History of cancer  Continue renal diet with thin liquids     HTN/HLD/DM2/history of uterine cancer and laryngeal  mass/MRDD/left hip replacement s/p postop wound infection  #Chronic conditions  Labs in a.m.  Vancomycin trough in a.m.  Tylenol as needed  Nursing to complete home med rec  Hold home metformin  Insulin sliding scale  Hypoglycemia protocol  PT/OT     DVT Ppx  SCDs  No indication for chemical prophylaxis  Out of bed with assistance     5/9: Creatinine improving. Continue IVF. Repeat labs tomorrow.    5/12: Creatinine has plateaued at 2.7. Renal artery ultrasound pending. Management per Nephrology. Discharge in 24-48 hours, suspect Vanc nephropathy that could take weeks to resolve.      5/13: Creatinine is 2.8 today, was 2.7 yesterday. Will resume IVF. Renal artery ultrasound unremarkable. Dispo per Nephrology.     5/14: Patient to resume Vancomycin tomorrow. Appeal for SNF once antibiotic course clarified. Creatinine improved to 2.7, continue IVF.    5/15: Patient started back on Vancomycin. Awaiting appeal for SNF.    5/16: Creatinine improved from 3.0 down to 2.9. Continue IV Vanc. Appeal for SNF pending.    5/17: Creatinine stable at 2.9.  Continue IV Vanc dosed per pharmacy.  Awaiting appeal for SNF.    5/18: Creatinine is stable at 2.9 for the past 3 draws.  Renal artery ultrasound unremarkable.  Continue IV vancomycin through the 22nd.  Await appeal process.    5/19: Creatinine did improve to 2.8 today.  Continue IV Vanco through the 22nd.  Awaiting appeal.         Toni Locke, DO

## 2025-05-20 NOTE — PROGRESS NOTES
Vancomycin Dosing by Pharmacy- FOLLOW UP    Tenzin Cherry is a 49 y.o. year old female who Pharmacy has been consulted for vancomycin dosing for cellulitis, skin and soft tissue. Based on the patient's indication and renal status this patient is being dosed based on a goal trough/random level of 10-15.     Renal function is currently stable.    Last vancomycin dose: 500 mg given x 1 dose on 25 @ 1130     Most recent trough level: 12.5 mcg/mL    Visit Vitals  /75 (BP Location: Left arm, Patient Position: Lying)   Pulse 63   Temp 36.4 °C (97.5 °F) (Temporal)   Resp 18        Lab Results   Component Value Date    CREATININE 2.85 (H) 2025    CREATININE 2.84 (H) 2025    CREATININE 2.91 (H) 2025    CREATININE 2.92 (H) 2025        Patient weight is as follows:   Vitals:    25 1903   Weight: 54.4 kg (120 lb)       Cultures:  No results found for the encounter in last 14 days.       I/O last 3 completed shifts:  In: - (0 mL/kg)   Out: 1300 (23.9 mL/kg) [Urine:1300 (0.7 mL/kg/hr)]  Dosing Weight: 54.4 kg   I/O during current shift:  No intake/output data recorded.    Temp (24hrs), Av.4 °C (97.6 °F), Min:36.3 °C (97.3 °F), Max:36.8 °C (98.2 °F)      Assessment/Plan    Trough level of 12.5mcg/mL is within goal range of 10-15mcg/mL for indication of cellulitis, skin and soft tissue infection. Although level is within goal, will continue to HOLD vancomycin today, as last dose given when trough was around 12mcg/mL, level spiked to supra-therapeutic range. Last dose of vancomycin administered 25 @ ~1130. Note: stop date of vancomycin therapy 25, plan to continue dosing until that time. Anticipate that patient will need one more dose tomorrow morning, then course of antibiotic therapy will be completed.  The next level will be obtained on 25 with AM labs. May be obtained sooner if clinically indicated.   Will continue to monitor renal function daily while on  vancomycin and order serum creatinine at least every 48 hours if not already ordered.  Follow for continued vancomycin needs, clinical response, and signs/symptoms of toxicity.     Pamela Haas, PharmD, BCPS   5/20/2025 7:27 AM

## 2025-05-20 NOTE — PROGRESS NOTES
Appeal documents received by insurance company. They are not requested any further documents at this time.

## 2025-05-21 VITALS
OXYGEN SATURATION: 98 % | SYSTOLIC BLOOD PRESSURE: 129 MMHG | RESPIRATION RATE: 18 BRPM | BODY MASS INDEX: 25.89 KG/M2 | WEIGHT: 120 LBS | TEMPERATURE: 97.9 F | HEIGHT: 57 IN | HEART RATE: 76 BPM | DIASTOLIC BLOOD PRESSURE: 82 MMHG

## 2025-05-21 LAB
ALBUMIN SERPL BCP-MCNC: 3.6 G/DL (ref 3.4–5)
ALP SERPL-CCNC: 167 U/L (ref 33–110)
ALT SERPL W P-5'-P-CCNC: 11 U/L (ref 7–45)
ANION GAP SERPL CALC-SCNC: 14 MMOL/L (ref 10–20)
AST SERPL W P-5'-P-CCNC: 16 U/L (ref 9–39)
BASOPHILS # BLD AUTO: 0.07 X10*3/UL (ref 0–0.1)
BASOPHILS NFR BLD AUTO: 1 %
BILIRUB SERPL-MCNC: 0.4 MG/DL (ref 0–1.2)
BUN SERPL-MCNC: 32 MG/DL (ref 6–23)
CALCIUM SERPL-MCNC: 10.1 MG/DL (ref 8.6–10.3)
CHLORIDE SERPL-SCNC: 98 MMOL/L (ref 98–107)
CO2 SERPL-SCNC: 27 MMOL/L (ref 21–32)
CREAT SERPL-MCNC: 2.9 MG/DL (ref 0.5–1.05)
EGFRCR SERPLBLD CKD-EPI 2021: 20 ML/MIN/1.73M*2
EOSINOPHIL # BLD AUTO: 0.21 X10*3/UL (ref 0–0.7)
EOSINOPHIL NFR BLD AUTO: 3 %
ERYTHROCYTE [DISTWIDTH] IN BLOOD BY AUTOMATED COUNT: 15.7 % (ref 11.5–14.5)
GLUCOSE BLD MANUAL STRIP-MCNC: 122 MG/DL (ref 74–99)
GLUCOSE BLD MANUAL STRIP-MCNC: 132 MG/DL (ref 74–99)
GLUCOSE BLD MANUAL STRIP-MCNC: 134 MG/DL (ref 74–99)
GLUCOSE BLD MANUAL STRIP-MCNC: 153 MG/DL (ref 74–99)
GLUCOSE BLD MANUAL STRIP-MCNC: 155 MG/DL (ref 74–99)
GLUCOSE BLD MANUAL STRIP-MCNC: 167 MG/DL (ref 74–99)
GLUCOSE SERPL-MCNC: 119 MG/DL (ref 74–99)
HCT VFR BLD AUTO: 29.3 % (ref 36–46)
HGB BLD-MCNC: 9.3 G/DL (ref 12–16)
IMM GRANULOCYTES # BLD AUTO: 0.04 X10*3/UL (ref 0–0.7)
IMM GRANULOCYTES NFR BLD AUTO: 0.6 % (ref 0–0.9)
LYMPHOCYTES # BLD AUTO: 1.12 X10*3/UL (ref 1.2–4.8)
LYMPHOCYTES NFR BLD AUTO: 16 %
MCH RBC QN AUTO: 29.4 PG (ref 26–34)
MCHC RBC AUTO-ENTMCNC: 31.7 G/DL (ref 32–36)
MCV RBC AUTO: 93 FL (ref 80–100)
MONOCYTES # BLD AUTO: 1.07 X10*3/UL (ref 0.1–1)
MONOCYTES NFR BLD AUTO: 15.3 %
NEUTROPHILS # BLD AUTO: 4.48 X10*3/UL (ref 1.2–7.7)
NEUTROPHILS NFR BLD AUTO: 64.1 %
NRBC BLD-RTO: 0.3 /100 WBCS (ref 0–0)
PLATELET # BLD AUTO: 386 X10*3/UL (ref 150–450)
POTASSIUM SERPL-SCNC: 4.7 MMOL/L (ref 3.5–5.3)
PROT SERPL-MCNC: 6.8 G/DL (ref 6.4–8.2)
RBC # BLD AUTO: 3.16 X10*6/UL (ref 4–5.2)
SODIUM SERPL-SCNC: 134 MMOL/L (ref 136–145)
VANCOMYCIN SERPL-MCNC: 10.3 UG/ML (ref 5–20)
WBC # BLD AUTO: 7 X10*3/UL (ref 4.4–11.3)

## 2025-05-21 PROCEDURE — 1100000001 HC PRIVATE ROOM DAILY

## 2025-05-21 PROCEDURE — 82947 ASSAY GLUCOSE BLOOD QUANT: CPT

## 2025-05-21 PROCEDURE — 80202 ASSAY OF VANCOMYCIN: CPT | Performed by: INTERNAL MEDICINE

## 2025-05-21 PROCEDURE — 2500000001 HC RX 250 WO HCPCS SELF ADMINISTERED DRUGS (ALT 637 FOR MEDICARE OP): Performed by: NURSE PRACTITIONER

## 2025-05-21 PROCEDURE — 85025 COMPLETE CBC W/AUTO DIFF WBC: CPT | Performed by: INTERNAL MEDICINE

## 2025-05-21 PROCEDURE — 80053 COMPREHEN METABOLIC PANEL: CPT | Performed by: INTERNAL MEDICINE

## 2025-05-21 PROCEDURE — 2500000004 HC RX 250 GENERAL PHARMACY W/ HCPCS (ALT 636 FOR OP/ED): Mod: JZ | Performed by: INTERNAL MEDICINE

## 2025-05-21 PROCEDURE — 2500000001 HC RX 250 WO HCPCS SELF ADMINISTERED DRUGS (ALT 637 FOR MEDICARE OP): Performed by: INTERNAL MEDICINE

## 2025-05-21 PROCEDURE — 2500000002 HC RX 250 W HCPCS SELF ADMINISTERED DRUGS (ALT 637 FOR MEDICARE OP, ALT 636 FOR OP/ED): Performed by: NURSE PRACTITIONER

## 2025-05-21 RX ORDER — DOCUSATE SODIUM 100 MG/1
100 CAPSULE, LIQUID FILLED ORAL DAILY
Qty: 90 CAPSULE | Refills: 0 | Status: SHIPPED | OUTPATIENT
Start: 2025-05-22

## 2025-05-21 RX ORDER — VANCOMYCIN HYDROCHLORIDE 500 MG/100ML
500 INJECTION, SOLUTION INTRAVENOUS ONCE
Status: COMPLETED | OUTPATIENT
Start: 2025-05-21 | End: 2025-05-21

## 2025-05-21 RX ORDER — CARVEDILOL 12.5 MG/1
12.5 TABLET ORAL 2 TIMES DAILY
Qty: 60 TABLET | Refills: 0 | Status: SHIPPED | OUTPATIENT
Start: 2025-05-21

## 2025-05-21 RX ORDER — ACETAMINOPHEN 325 MG/1
650 TABLET ORAL EVERY 4 HOURS PRN
Qty: 90 TABLET | Refills: 0 | Status: SHIPPED | OUTPATIENT
Start: 2025-05-21

## 2025-05-21 RX ORDER — FAMOTIDINE 10 MG/1
10 TABLET ORAL DAILY
Qty: 30 TABLET | Refills: 0 | Status: SHIPPED | OUTPATIENT
Start: 2025-05-22

## 2025-05-21 RX ORDER — ASPIRIN 81 MG/1
81 TABLET ORAL DAILY
Qty: 90 TABLET | Refills: 0 | Status: SHIPPED | OUTPATIENT
Start: 2025-05-22

## 2025-05-21 RX ADMIN — ASPIRIN 81 MG: 81 TABLET, COATED ORAL at 08:17

## 2025-05-21 RX ADMIN — ACETAMINOPHEN 650 MG: 325 TABLET, FILM COATED ORAL at 08:21

## 2025-05-21 RX ADMIN — VANCOMYCIN HYDROCHLORIDE 500 MG: 500 INJECTION, SOLUTION INTRAVENOUS at 12:17

## 2025-05-21 RX ADMIN — FAMOTIDINE 10 MG: 20 TABLET, FILM COATED ORAL at 08:16

## 2025-05-21 RX ADMIN — CARVEDILOL 12.5 MG: 12.5 TABLET, FILM COATED ORAL at 08:17

## 2025-05-21 RX ADMIN — CARVEDILOL 12.5 MG: 12.5 TABLET, FILM COATED ORAL at 20:39

## 2025-05-21 RX ADMIN — INSULIN LISPRO 2 UNITS: 100 INJECTION, SOLUTION INTRAVENOUS; SUBCUTANEOUS at 12:18

## 2025-05-21 RX ADMIN — DOCUSATE SODIUM 100 MG: 100 CAPSULE, LIQUID FILLED ORAL at 08:17

## 2025-05-21 ASSESSMENT — PAIN - FUNCTIONAL ASSESSMENT
PAIN_FUNCTIONAL_ASSESSMENT: 0-10
PAIN_FUNCTIONAL_ASSESSMENT: 0-10

## 2025-05-21 ASSESSMENT — COGNITIVE AND FUNCTIONAL STATUS - GENERAL
TOILETING: A LITTLE
TURNING FROM BACK TO SIDE WHILE IN FLAT BAD: A LITTLE
STANDING UP FROM CHAIR USING ARMS: A LITTLE
STANDING UP FROM CHAIR USING ARMS: A LITTLE
HELP NEEDED FOR BATHING: A LITTLE
HELP NEEDED FOR BATHING: A LITTLE
MOVING FROM LYING ON BACK TO SITTING ON SIDE OF FLAT BED WITH BEDRAILS: A LITTLE
MOBILITY SCORE: 18
DAILY ACTIVITIY SCORE: 21
CLIMB 3 TO 5 STEPS WITH RAILING: A LITTLE
MOVING TO AND FROM BED TO CHAIR: A LITTLE
WALKING IN HOSPITAL ROOM: A LITTLE
TOILETING: A LITTLE
MOVING TO AND FROM BED TO CHAIR: A LITTLE
DRESSING REGULAR LOWER BODY CLOTHING: A LITTLE
TURNING FROM BACK TO SIDE WHILE IN FLAT BAD: A LITTLE
CLIMB 3 TO 5 STEPS WITH RAILING: A LITTLE
WALKING IN HOSPITAL ROOM: A LITTLE
DAILY ACTIVITIY SCORE: 21
DRESSING REGULAR LOWER BODY CLOTHING: A LITTLE

## 2025-05-21 ASSESSMENT — PAIN SCALES - GENERAL
PAINLEVEL_OUTOF10: 0 - NO PAIN
PAINLEVEL_OUTOF10: 4
PAINLEVEL_OUTOF10: 2
PAINLEVEL_OUTOF10: 0 - NO PAIN

## 2025-05-21 ASSESSMENT — PAIN DESCRIPTION - ORIENTATION: ORIENTATION: LEFT

## 2025-05-21 ASSESSMENT — PAIN DESCRIPTION - LOCATION: LOCATION: HIP

## 2025-05-21 NOTE — DISCHARGE SUMMARY
Discharge Diagnosis  ALEIDA (acute kidney injury)    Issues Requiring Follow-Up  ALEIDA    Test Results Pending At Discharge  Pending Labs       No current pending labs.            Hospital Course   Tenzin is a 49-year-old female with past medical history of MRDD, recent left hip replacement with postop infection currently receiving IV antibiotics at her skilled nursing facility presenting to emergency department for an elevated vancomycin trough and abnormal labs.  Patient was signed out for evaluation of an abnormal creatinine and elevated vancomycin trough.  Patient denies any pain or distress.  Patient is alert and oriented x 3.  Patient was found to have a glucose of 128, potassium 3.1, creatinine 3.24, GFR 17, hemoglobin 9.6, hematocrit 28.8.  Patient is currently receiving vancomycin 1 g every 24 hours and Zosyn 3.375 g every 6 hours for a postop left hip infection.  Patient received 1 L of normal saline in ED, medicated with Tylenol p.o. and KCl 40 Meq p.o.  Admitted for further medical management.     ALEIDA/hypokalemia/anemia  Inpatient admission to Dr. Toni Locke  NS x 1 L in ED/continue LR at 75 an hour  Hold Vancomycin at this time   K+ 3.1  KCl 40 mEq p.o.  Repeat labs in a.m.  Hgb 9.6  Monitor for bleeding  Trend hemoglobin  History of cancer  Continue renal diet with thin liquids     HTN/HLD/DM2/history of uterine cancer and laryngeal mass/MRDD/left hip replacement s/p postop wound infection  #Chronic conditions  Labs in a.m.  Vancomycin trough in a.m.  Tylenol as needed  Nursing to complete home med rec  Hold home metformin  Insulin sliding scale  Hypoglycemia protocol  PT/OT     DVT Ppx  SCDs  No indication for chemical prophylaxis  Out of bed with assistance     5/9: Creatinine improving. Continue IVF. Repeat labs tomorrow.     5/12: Creatinine has plateaued at 2.7. Renal artery ultrasound pending. Management per Nephrology. Discharge in 24-48 hours, suspect Vanc nephropathy that could take weeks  to resolve.       5/13: Creatinine is 2.8 today, was 2.7 yesterday. Will resume IVF. Renal artery ultrasound unremarkable. Dispo per Nephrology.      5/14: Patient to resume Vancomycin tomorrow. Appeal for SNF once antibiotic course clarified. Creatinine improved to 2.7, continue IVF.     5/15: Patient started back on Vancomycin. Awaiting appeal for SNF.     5/16: Creatinine improved from 3.0 down to 2.9. Continue IV Vanc. Appeal for SNF pending.     5/17: Creatinine stable at 2.9.  Continue IV Vanc dosed per pharmacy.  Awaiting appeal for SNF.     5/18: Creatinine is stable at 2.9 for the past 3 draws.  Renal artery ultrasound unremarkable.  Continue IV vancomycin through the 22nd.  Await appeal process.     5/19: Creatinine did improve to 2.8 today.  Continue IV Vanco through the 22nd.  Awaiting appeal.     5/20: Creatinine stable at 2.8. Continue IV Vanc per Pharmacy.      5/21: Appeal overturned. Creatinine stable at 2.9. Vanc therapeutic, will cover patient for tomorrow. She does not need any further Vancomycin. She was discharged to SNF.    Pertinent Physical Exam At Time of Discharge  Physical Exam  Constitutional:       Appearance: Normal appearance.   HENT:      Mouth/Throat:      Mouth: Mucous membranes are dry.      Pharynx: Oropharynx is clear.   Eyes:      Pupils: Pupils are equal, round, and reactive to light.   Cardiovascular:      Rate and Rhythm: Normal rate and regular rhythm.   Pulmonary:      Effort: Pulmonary effort is normal.      Breath sounds: Rhonchi present.   Abdominal:      General: Bowel sounds are normal.      Palpations: Abdomen is soft.   Musculoskeletal:      Cervical back: Normal range of motion.      Left hip: Tenderness present.   Skin:     General: Skin is warm and dry.      Capillary Refill: Capillary refill takes less than 2 seconds.   Neurological:      Mental Status: She is alert and oriented to person, place, and time. Mental status is at baseline.   Psychiatric:          Mood and Affect: Mood normal.      Home Medications     Medication List      START taking these medications     acetaminophen 325 mg tablet; Commonly known as: Tylenol; Take 2 tablets   (650 mg) by mouth every 4 hours if needed for mild pain (1 - 3).   carvedilol 12.5 mg tablet; Commonly known as: Coreg; Take 1 tablet (12.5   mg) by mouth 2 times a day.   famotidine 10 mg tablet; Commonly known as: Pepcid; Take 1 tablet (10   mg) by mouth once daily.; Start taking on: May 22, 2025     CHANGE how you take these medications     aspirin 81 mg EC tablet; Take 1 tablet (81 mg) by mouth once daily.;   Start taking on: May 22, 2025; What changed: when to take this   docusate sodium 100 mg capsule; Commonly known as: Colace; Take 1   capsule (100 mg) by mouth once daily.; Start taking on: May 22, 2025; What   changed: when to take this     CONTINUE taking these medications     albuterol 90 mcg/actuation inhaler   calcium carbonate 500 mg (200 mg elemental) chewable tablet; Commonly   known as: Tums   melatonin 3 mg tablet     STOP taking these medications     losartan 25 mg tablet; Commonly known as: Cozaar   metFORMIN  mg 24 hr tablet; Commonly known as: Glucophage-XR   omeprazole 20 mg DR capsule; Commonly known as: PriLOSEC   sulfamethoxazole-trimethoprim 800-160 mg tablet; Commonly known as:   Bactrim DS       Outpatient Follow-Up  No future appointments.    Toni Locke DO

## 2025-05-21 NOTE — PROGRESS NOTES
Tenzin Cherry is a 49 y.o. female on day 13 of admission presenting with ALEIDA (acute kidney injury).      Subjective   No events overnight. Patient seen and examined at bedside. She has no complaints.        Objective     Last Recorded Vitals  /88   Pulse 72   Temp 36.5 °C (97.7 °F)   Resp 18   Wt 54.4 kg (120 lb)   SpO2 96%   Intake/Output last 3 Shifts:    Intake/Output Summary (Last 24 hours) at 5/20/2025 2054  Last data filed at 5/20/2025 0608  Gross per 24 hour   Intake --   Output 300 ml   Net -300 ml       Admission Weight  Weight: 54.4 kg (120 lb) (05/07/25 1903)    Daily Weight  05/07/25 : 54.4 kg (120 lb)    Image Results  US renal complete  Narrative: Interpreted By:  Chong Gar,   STUDY:  US RENAL COMPLETE; 5/17/2025 3:42 pm      INDICATION:  Signs/Symptoms:Renal failure.      COMPARISON:  None      ACCESSION NUMBER(S):  [id number]      ORDERING CLINICIAN:  ENRIQUE TORRES      TECHNIQUE:  Grayscale and color Doppler imaging of the kidneys      FINDINGS:  The right kidney measures 9.8 cm  .  The left kidney measures 8.5 cm  .          No renal stones are identified.  The renal cortical thickness and  echogenicity is normal.  No hydronephrosis is identified.      Urinary bladder is nonspecific in appearance with no stones or masses  identified.  Bladder assessment is somewhat limited as the bladder  was not well distended.      Impression: Non specific appearance of the kidneys as described.      MACRO:  none      Signed by: Chong Gar 5/17/2025 5:28 PM  Dictation workstation:   [id number]      Physical Exam  Constitutional:       Appearance: Normal appearance.   HENT:      Mouth/Throat:      Mouth: Mucous membranes are dry.      Pharynx: Oropharynx is clear.   Eyes:      Pupils: Pupils are equal, round, and reactive to light.   Cardiovascular:      Rate and Rhythm: Normal rate and regular rhythm.   Pulmonary:      Effort: Pulmonary effort is normal.      Breath sounds:  Rhonchi present.   Abdominal:      General: Bowel sounds are normal.      Palpations: Abdomen is soft.   Musculoskeletal:      Cervical back: Normal range of motion.      Left hip: Tenderness present.   Skin:     General: Skin is warm and dry.      Capillary Refill: Capillary refill takes less than 2 seconds.   Neurological:      Mental Status: She is alert and oriented to person, place, and time. Mental status is at baseline.   Psychiatric:         Mood and Affect: Mood normal.      Relevant Results                    This patient has a central line   Reason for the central line remaining today? Parenteral nutrition            Assessment & Plan  ALEIDA (acute kidney injury)    Hypokalemia    Anemia    Yesimar is a 49-year-old female with past medical history of MRDD, recent left hip replacement with postop infection currently receiving IV antibiotics at her skilled nursing facility presenting to emergency department for an elevated vancomycin trough and abnormal labs.  Patient was signed out for evaluation of an abnormal creatinine and elevated vancomycin trough.  Patient denies any pain or distress.  Patient is alert and oriented x 3.  Patient was found to have a glucose of 128, potassium 3.1, creatinine 3.24, GFR 17, hemoglobin 9.6, hematocrit 28.8.  Patient is currently receiving vancomycin 1 g every 24 hours and Zosyn 3.375 g every 6 hours for a postop left hip infection.  Patient received 1 L of normal saline in ED, medicated with Tylenol p.o. and KCl 40 Meq p.o.  Admitted for further medical management.     ALEIDA/hypokalemia/anemia  Inpatient admission to Dr. Toni Locke  NS x 1 L in ED/continue LR at 75 an hour  Hold Vancomycin at this time   K+ 3.1  KCl 40 mEq p.o.  Repeat labs in a.m.  Hgb 9.6  Monitor for bleeding  Trend hemoglobin  History of cancer  Continue renal diet with thin liquids     HTN/HLD/DM2/history of uterine cancer and laryngeal mass/MRDD/left hip replacement s/p postop wound  infection  #Chronic conditions  Labs in a.m.  Vancomycin trough in a.m.  Tylenol as needed  Nursing to complete home med rec  Hold home metformin  Insulin sliding scale  Hypoglycemia protocol  PT/OT     DVT Ppx  SCDs  No indication for chemical prophylaxis  Out of bed with assistance     5/9: Creatinine improving. Continue IVF. Repeat labs tomorrow.    5/12: Creatinine has plateaued at 2.7. Renal artery ultrasound pending. Management per Nephrology. Discharge in 24-48 hours, suspect Vanc nephropathy that could take weeks to resolve.      5/13: Creatinine is 2.8 today, was 2.7 yesterday. Will resume IVF. Renal artery ultrasound unremarkable. Dispo per Nephrology.     5/14: Patient to resume Vancomycin tomorrow. Appeal for SNF once antibiotic course clarified. Creatinine improved to 2.7, continue IVF.    5/15: Patient started back on Vancomycin. Awaiting appeal for SNF.    5/16: Creatinine improved from 3.0 down to 2.9. Continue IV Vanc. Appeal for SNF pending.    5/17: Creatinine stable at 2.9.  Continue IV Vanc dosed per pharmacy.  Awaiting appeal for SNF.    5/18: Creatinine is stable at 2.9 for the past 3 draws.  Renal artery ultrasound unremarkable.  Continue IV vancomycin through the 22nd.  Await appeal process.    5/19: Creatinine did improve to 2.8 today.  Continue IV Vanco through the 22nd.  Awaiting appeal.    5/20: Creatinine stable at 2.8. Continue IV Vanc per Pharmacy.          Toni Locke, DO

## 2025-05-21 NOTE — PROGRESS NOTES
Vancomycin Dosing by Pharmacy- FOLLOW UP    Tenzin Cherry is a 49 y.o. year old female who Pharmacy has been consulted for vancomycin dosing for cellulitis, skin and soft tissue. Based on the patient's indication and renal status this patient is being dosed based on a goal trough/random level of 10-15.     Renal function is currently stable.    Last vancomycin dose: 500 mg given x 1 dose on 25 @ 1130    Most recent trough level: 10.3 mcg/mL    Visit Vitals  /85   Pulse 65   Temp 36.2 °C (97.2 °F)   Resp 18        Lab Results   Component Value Date    CREATININE 2.90 (H) 2025    CREATININE 2.85 (H) 2025    CREATININE 2.84 (H) 2025    CREATININE 2.91 (H) 2025        Patient weight is as follows:   Vitals:    25 1903   Weight: 54.4 kg (120 lb)       Cultures:  No results found for the encounter in last 14 days.       I/O last 3 completed shifts:  In: - (0 mL/kg)   Out: 900 (16.5 mL/kg) [Urine:900 (0.5 mL/kg/hr)]  Dosing Weight: 54.4 kg   I/O during current shift:  No intake/output data recorded.    Temp (24hrs), Av.2 °C (97.2 °F), Min:36 °C (96.8 °F), Max:36.5 °C (97.7 °F)      Assessment/Plan    Trough level of 10.3mcg/mL is within goal range of 10-15mcg/mL for indication of cellulitis, skin and soft tissue infection. Level is at the lower end of goal range, as such, will give vancomycin 500mg IV X 1 dose this afternoon. Patient's planned stop date of vancomycin is 25, so this will be the last dose, thus, no further levels will be required at this time. Will place order for one more dose today to complete her antimicrobial therapy course.   Vancomycin therapy to be completed after today's dose, therefore, no additional levels are needed at this time. Will follow up 25, at which time, pharmacy will complete vancomycin consult.   Will continue to monitor renal function daily while on vancomycin and order serum creatinine at least every 48 hours if not  already ordered.  Follow for continued vancomycin needs, clinical response, and signs/symptoms of toxicity.     Pamela Haas, PharmD, BCPS   5/21/2025 7:28 AM

## 2025-05-21 NOTE — PROGRESS NOTES
"Nutrition Follow Up Assessment:   Nutrition Assessment         Patient is a 49 y.o. female presenting with ALEIDA.       Nutrition History:  Energy Intake: Fair 50-75 %  Pain affecting nutrition status: N/A  Food and Nutrient History: Pt reports she is still eating about 50% of her meals. States she is drinking the ensures, declined offer for increase to 3x/day. Reports some nausea, is getting meds for this.       Anthropometrics:  Height: 144.8 cm (4' 9\")   Weight: 54.4 kg (120 lb)   BMI (Calculated): 25.96  IBW/kg (Dietitian Calculated): 38.6 kg                         Weight History:     Weight Change %:  Weight History / % Weight Change: No new wt to assess    Nutrition Focused Physical Exam Findings:    Subcutaneous Fat Loss:   Defer Subcutaneous Fat Loss Assessment: Defer all  Defer All Reason: completed at intial assessment  Muscle Wasting:  Defer Muscle Wasting Assessment: Defer all  Defer All Reason: completed at intial assessment  Edema:  Edema Location: non-pitting BLE per nursing assessment  Physical Findings:  Skin: Positive (left leg wound per nursing assessment)    Nutrition Significant Labs:    Reviewed   Nutrition Specific Medications:  Reviewed     I/O:   Last BM Date: 05/18/25; Stool Appearance: Loose (05/21/25 1156)    Dietary Orders (From admission, onward)       Start     Ordered    05/16/25 1426  Oral nutritional supplements  Until discontinued        Comments: chocolate   Question Answer Comment   Deliver with Breakfast    Deliver with Dinner    Select supplement: Ensure Plus High Protein        05/16/25 1426    05/10/25 1244  Adult diet Regular; Thin 0  Diet effective now        Question Answer Comment   Diet type Regular    Fluid consistency Thin 0        05/10/25 1243    05/08/25 0134  May Participate in Room Service  ( ROOM SERVICE MAY PARTICIPATE)  Once        Question:  .  Answer:  Yes    05/08/25 0133                     Estimated Needs:      Method for Estimating Needs: 9531-7275 " kcal/d (25-30 kcal/kg ABW)     Method for Estimating 24 Hour Protein Needs: 44-54 g/d (0.8-1 g/kg ABW)     Method for Estimating 24 Hour Fluid Needs: 6759-8148 ml/d (1 ml/kcal or per MD)  Patient on Order Fluid Restriction: No        Nutrition Diagnosis   Malnutrition Diagnosis  Patient has Malnutrition Diagnosis:  (unable to determine at this time)    Nutrition Diagnosis  Patient has Nutrition Diagnosis: Yes  Diagnosis Status (1): Active  Nutrition Diagnosis 1: Unintended weight loss  Related to (1): suspected prolonged inadequate PO intake  As Evidenced by (1): 24.8% wt loss x 7 months       Nutrition Interventions/Recommendations   Nutrition prescription for oral nutrition    Nutrition Recommendations:  Individualized Nutrition Prescription Provided for : Regular diet with ensure plus high protein BID    Nutrition Interventions/Goals:   Meals and Snacks: General healthful diet  Goal: Consumes 3 meals per day  Medical Food Supplement: Commercial beverage medical food supplement therapy  Goal: Ensure Plus High Protein TID chocolate (provides 350 kcal, 20 g protein per serving).      Education Documentation  No documentation found.     Patient with no diet related questions at this time.         Nutrition Monitoring and Evaluation   Food/Nutrient Related History Monitoring  Monitoring and Evaluation Plan: Intake / amount of food, Estimated Energy Intake  Intake / Amount of food: Consumes at least 75% or more of meals/snacks/supplements, Meets > 75% estimated energy needs    Anthropometric Measurements  Monitoring and Evaluation Plan: Body weight  Body Weight: Body weight - Maintain stable weight         Physical Exam Findings  Monitoring and Evaluation Plan: Digestive System  Digestive System Finding: Nausea, Diarrhea  Criteria: Improvement in GI function/symptoms    Goal Status: Some progress toward goal(s)    Time Spent (min): 30 minutes

## 2025-05-21 NOTE — CARE PLAN
The patient's goals for the shift include rest.    The clinical goals for the shift include safety. comfort, and pain control.    Pain - Adult  Add All  Verbalizes/displays adequate comfort level or baseline comfort level  Add  Today at 0024 - Progressing by Chucky Terrazas, RN  Add  Safety - Adult  Add All  Free from fall injury  Add  Today at 0024 - Progressing by Chucky Terrazas RN  Add  Discharge Planning  Add All  Discharge to home or other facility with appropriate resources  Add  Today at 0024 - Progressing by Chucky Terrazas, RN  Add  Chronic Conditions and Co-morbidities  Add All  Patient's chronic conditions and co-morbidity symptoms are monitored and maintained or improved  Add  Today at 0024 - Progressing by Chucky Terrazas, CHAY  Add  Nutrition  Add All  Nutrient intake appropriate for maintaining nutritional needs  Add  Today at 0024 - Progressing by Chucky Terrazas, CHAY  Add  Fall/Injury  Add All  Not fall by end of shift  Add  Today at 0024 - Progressing by Chucky Terrazas, CHAY  Add  Be free from injury by end of the shift  Add  Today at 0024 - Progressing by Chucky Terrazas, RN  Add  Verbalize understanding of personal risk factors for fall in the hospital  Add  Today at 0024 - Progressing by Chucky Terrazas, RN  Add  Verbalize understanding of risk factor reduction measures to prevent injury from fall in the home  Add  Today at 0024 - Progressing by Chucky Terrazas, CHAY  Add  Use assistive devices by end of the shift  Add  Today at 0024 - Progressing by Chucky Terrazas RN  Add  Pace activities to prevent fatigue by end of the shift  Add  Today at 0024 - Progressing by Chucky Terrazas, RN  Add  PICC line  Add All  I will remain free from symptoms of infection  Add  Today at 0024 - Progressing by Chucky Terrazas, CHAY  Add  Skin  Add All  Participates in plan/prevention/treatment measures  Add  Today at 0024 - Progressing by Chucky Terrazas RN  Add  Flowsheets  Taken today at  0024  Participates in plan/prevention/treatment measures  Increase activity/out of bed for meals  Prevent/manage excess moisture  Add  Today at 0024 - Progressing by Chucky Terrazas RN  Add  Flowsheets  Taken today at 0024  Prevent/manage excess moisture  Monitor for/manage infection if present  Prevent/minimize sheer/friction injuries  Add  Today at 0024 - Progressing by Chucky Terrazas RN  Add  Flowsheets  Taken today at 0024  Prevent/minimize sheer/friction injuries  HOB 30 degrees or less  Promote/optimize nutrition  Add  Today at 0024 - Progressing by Chucky Terrazas RN  Add  Flowsheets  Taken today at 0024  Promote/optimize nutrition  Offer water/supplements/favorite foods  Promote skin healing  Add  Today at 0024 - Progressing by Chucky Terrazas RN  Add  Flowsheets  Taken today at 0024  Promote skin healing  Assess skin/pad under line(s)/device(s)

## 2025-05-21 NOTE — PROGRESS NOTES
Subjective   She is alert and oriented  A GFR by Cystatin C is decreased at 18  Objective          Vitals 24HR  Heart Rate:  [62-72]   Temp:  [36 °C (96.8 °F)-36.5 °C (97.7 °F)]   Resp:  [16-18]   BP: (115-134)/(78-88)   SpO2:  [96 %-99 %]         Intake/Output last 3 Shifts:    Intake/Output Summary (Last 24 hours) at 5/21/2025 1146  Last data filed at 5/21/2025 0623  Gross per 24 hour   Intake --   Output 600 ml   Net -600 ml                                                                                                                                                                   Physical Exam  General appearance; alert oriented  HEENT; there is no icterus pupils react to light accommodation  Neck; no JVD no bruit no thyromegaly no adenopathy  Lungs; clear to auscultation and percussion  Heart; regular rate no gallop no rubs no thrills   Abdomen ;active peristalsis, no guarding no rubs  Extremities; no edema  Neurological; no clonus no tremors no asterixis  Relevant Results  Results for orders placed or performed during the hospital encounter of 05/07/25 (from the past 24 hours)   POCT GLUCOSE   Result Value Ref Range    POCT Glucose 162 (H) 74 - 99 mg/dL   POCT GLUCOSE   Result Value Ref Range    POCT Glucose 190 (H) 74 - 99 mg/dL   CBC and Auto Differential   Result Value Ref Range    WBC 7.0 4.4 - 11.3 x10*3/uL    nRBC 0.3 (H) 0.0 - 0.0 /100 WBCs    RBC 3.16 (L) 4.00 - 5.20 x10*6/uL    Hemoglobin 9.3 (L) 12.0 - 16.0 g/dL    Hematocrit 29.3 (L) 36.0 - 46.0 %    MCV 93 80 - 100 fL    MCH 29.4 26.0 - 34.0 pg    MCHC 31.7 (L) 32.0 - 36.0 g/dL    RDW 15.7 (H) 11.5 - 14.5 %    Platelets 386 150 - 450 x10*3/uL    Neutrophils % 64.1 40.0 - 80.0 %    Immature Granulocytes %, Automated 0.6 0.0 - 0.9 %    Lymphocytes % 16.0 13.0 - 44.0 %    Monocytes % 15.3 2.0 - 10.0 %    Eosinophils % 3.0 0.0 - 6.0 %    Basophils % 1.0 0.0 - 2.0 %    Neutrophils Absolute 4.48 1.20 - 7.70 x10*3/uL    Immature Granulocytes  Absolute, Automated 0.04 0.00 - 0.70 x10*3/uL    Lymphocytes Absolute 1.12 (L) 1.20 - 4.80 x10*3/uL    Monocytes Absolute 1.07 (H) 0.10 - 1.00 x10*3/uL    Eosinophils Absolute 0.21 0.00 - 0.70 x10*3/uL    Basophils Absolute 0.07 0.00 - 0.10 x10*3/uL   Comprehensive Metabolic Panel   Result Value Ref Range    Glucose 119 (H) 74 - 99 mg/dL    Sodium 134 (L) 136 - 145 mmol/L    Potassium 4.7 3.5 - 5.3 mmol/L    Chloride 98 98 - 107 mmol/L    Bicarbonate 27 21 - 32 mmol/L    Anion Gap 14 10 - 20 mmol/L    Urea Nitrogen 32 (H) 6 - 23 mg/dL    Creatinine 2.90 (H) 0.50 - 1.05 mg/dL    eGFR 20 (L) >60 mL/min/1.73m*2    Calcium 10.1 8.6 - 10.3 mg/dL    Albumin 3.6 3.4 - 5.0 g/dL    Alkaline Phosphatase 167 (H) 33 - 110 U/L    Total Protein 6.8 6.4 - 8.2 g/dL    AST 16 9 - 39 U/L    Bilirubin, Total 0.4 0.0 - 1.2 mg/dL    ALT 11 7 - 45 U/L   Vancomycin   Result Value Ref Range    Vancomycin 10.3 5.0 - 20.0 ug/mL   POCT GLUCOSE   Result Value Ref Range    POCT Glucose 132 (H) 74 - 99 mg/dL                       Assessment & Plan  ALEIDA (acute kidney injury)  Acute interstitial nephritis  Acute kidney injury  Anemia of renal disease  Hypertension  Overt proteinuria  Hypokalemia    Anemia  Plan  Patient currently had no symptoms of uremia.  Will continue to monitor progress in renal recovery.  If signs of uremia ensues patient may need vascular access and possible dialysis in the near future  Continue current therapy   I spent 20 minutes in the professional and overall care of this patient.      Arvin Sharma MD

## 2025-05-21 NOTE — PROGRESS NOTES
Received call from [facility] requesting OT notes. New OT notes faxed to [facility]. Appeal pending at this time.           5/21 2pm  Appeal aproved. Made attending aware and facility.         5/21 345pm  Patient has written discharge order. Transport arranged for 805pm. Nurse, patient and sister notified.

## 2025-05-21 NOTE — CARE PLAN
The patient's goals for the shift include rest.    The clinical goals for the shift include pt will be discharged      Problem: Pain - Adult  Goal: Verbalizes/displays adequate comfort level or baseline comfort level  5/21/2025 1940 by Dafne Soler RN  Outcome: Progressing  5/21/2025 0728 by Dafne Soler RN  Outcome: Progressing     Problem: Safety - Adult  Goal: Free from fall injury  5/21/2025 1940 by Dafne Soler RN  Outcome: Progressing  5/21/2025 0728 by Dafne Soler RN  Outcome: Progressing     Problem: Discharge Planning  Goal: Discharge to home or other facility with appropriate resources  5/21/2025 1940 by Dafne Soler RN  Outcome: Progressing  5/21/2025 0728 by Dafne Soler RN  Outcome: Progressing     Problem: Chronic Conditions and Co-morbidities  Goal: Patient's chronic conditions and co-morbidity symptoms are monitored and maintained or improved  5/21/2025 1940 by Dafne Soler RN  Outcome: Progressing  5/21/2025 0728 by Dafne Soler RN  Outcome: Progressing     Problem: Nutrition  Goal: Nutrient intake appropriate for maintaining nutritional needs  5/21/2025 1940 by Dafne Soler RN  Outcome: Progressing  5/21/2025 0728 by Dafne Soler RN  Outcome: Progressing     Problem: Fall/Injury  Goal: Not fall by end of shift  5/21/2025 1940 by Dafne Soler RN  Outcome: Progressing  5/21/2025 0728 by Dafne Soler RN  Outcome: Progressing  Goal: Be free from injury by end of the shift  5/21/2025 1940 by Dafne Soler RN  Outcome: Progressing  5/21/2025 0728 by Dafne Soler RN  Outcome: Progressing  Goal: Verbalize understanding of personal risk factors for fall in the hospital  5/21/2025 1940 by Dafne Soler RN  Outcome: Progressing  5/21/2025 0728 by Dafne Soler RN  Outcome: Progressing  Goal: Verbalize understanding of risk factor reduction measures to prevent injury from fall in the home  5/21/2025 1940 by Dafne Sloer RN  Outcome: Progressing  5/21/2025 0728 by Dafne Soler RN  Outcome:  Progressing  Goal: Use assistive devices by end of the shift  5/21/2025 1940 by Dafne Soler RN  Outcome: Progressing  5/21/2025 0728 by Dafne Soler RN  Outcome: Progressing  Goal: Pace activities to prevent fatigue by end of the shift  5/21/2025 1940 by Dafne Soler RN  Outcome: Progressing  5/21/2025 0728 by Dafne Soler RN  Outcome: Progressing     Problem: PICC line  Goal: I will remain free from symptoms of infection  5/21/2025 1940 by Dafne Soler RN  Outcome: Progressing  5/21/2025 0728 by Dafne Soler RN  Outcome: Progressing     Problem: Skin  Goal: Participates in plan/prevention/treatment measures  5/21/2025 1940 by Dafne Soler RN  Outcome: Progressing  5/21/2025 0728 by Dafne Soler RN  Outcome: Progressing  Goal: Prevent/manage excess moisture  5/21/2025 1940 by Dafne Soler RN  Outcome: Progressing  5/21/2025 0728 by Dafne Soler RN  Outcome: Progressing  Goal: Prevent/minimize sheer/friction injuries  5/21/2025 1940 by Dafne Soler RN  Outcome: Progressing  5/21/2025 0728 by Dafne Soler RN  Outcome: Progressing  Goal: Promote/optimize nutrition  5/21/2025 1940 by Dafne Soler RN  Outcome: Progressing  5/21/2025 0728 by Dafne Soler RN  Outcome: Progressing  Goal: Promote skin healing  5/21/2025 1940 by Dafne Soler RN  Outcome: Progressing  5/21/2025 0728 by Dafne Soler RN  Outcome: Progressing

## 2025-05-21 NOTE — ASSESSMENT & PLAN NOTE
Plan  Patient currently had no symptoms of uremia.  Will continue to monitor progress in renal recovery.  If signs of uremia ensues patient may need vascular access and possible dialysis in the near future